# Patient Record
Sex: MALE | Race: WHITE | NOT HISPANIC OR LATINO | Employment: UNEMPLOYED | URBAN - METROPOLITAN AREA
[De-identification: names, ages, dates, MRNs, and addresses within clinical notes are randomized per-mention and may not be internally consistent; named-entity substitution may affect disease eponyms.]

---
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
## 2021-01-26 ENCOUNTER — HISTORICAL (OUTPATIENT)
Dept: GASTROENTEROLOGY | Facility: CLINIC | Age: 21
End: 2021-01-26

## 2021-01-26 LAB
ABS NEUT (OLG): 6.96 X10(3)/MCL (ref 2.1–9.2)
ALBUMIN SERPL-MCNC: 4 GM/DL (ref 3.5–5)
ALBUMIN/GLOB SERPL: 1.1 RATIO (ref 1.1–2)
ALP SERPL-CCNC: 99 UNIT/L (ref 40–150)
ALT SERPL-CCNC: 35 UNIT/L (ref 0–55)
AST SERPL-CCNC: 16 UNIT/L (ref 5–34)
BASOPHILS # BLD AUTO: 0 X10(3)/MCL (ref 0–0.2)
BASOPHILS NFR BLD AUTO: 0 %
BILIRUB SERPL-MCNC: 0.2 MG/DL
BILIRUBIN DIRECT+TOT PNL SERPL-MCNC: 0.1 MG/DL (ref 0–0.5)
BILIRUBIN DIRECT+TOT PNL SERPL-MCNC: 0.1 MG/DL (ref 0–0.8)
BUN SERPL-MCNC: 16 MG/DL (ref 8.9–20.6)
CALCIUM SERPL-MCNC: 9.1 MG/DL (ref 8.4–10.2)
CHLORIDE SERPL-SCNC: 106 MMOL/L (ref 98–107)
CO2 SERPL-SCNC: 26 MMOL/L (ref 22–29)
CREAT SERPL-MCNC: 1.05 MG/DL (ref 0.73–1.18)
CRP SERPL HS-MCNC: 0.65 MG/DL
EOSINOPHIL # BLD AUTO: 0.2 X10(3)/MCL (ref 0–0.9)
EOSINOPHIL NFR BLD AUTO: 1 %
ERYTHROCYTE [DISTWIDTH] IN BLOOD BY AUTOMATED COUNT: 18.6 % (ref 11.5–14.5)
FERRITIN SERPL-MCNC: 4.12 NG/ML (ref 21.81–274.66)
FOLATE SERPL-MCNC: 4.1 NG/ML (ref 7–31.4)
GLOBULIN SER-MCNC: 3.5 GM/DL (ref 2.4–3.5)
GLUCOSE SERPL-MCNC: 87 MG/DL (ref 74–100)
HCT VFR BLD AUTO: 36.3 % (ref 40–51)
HGB BLD-MCNC: 11.2 GM/DL (ref 13.5–17.5)
IMM GRANULOCYTES # BLD AUTO: 0.07 10*3/UL
IMM GRANULOCYTES NFR BLD AUTO: 1 %
IRON SERPL-MCNC: 17 UG/DL (ref 65–175)
LYMPHOCYTES # BLD AUTO: 3.7 X10(3)/MCL (ref 0.6–4.6)
LYMPHOCYTES NFR BLD AUTO: 31 %
MCH RBC QN AUTO: 24.3 PG (ref 26–34)
MCHC RBC AUTO-ENTMCNC: 30.9 GM/DL (ref 31–37)
MCV RBC AUTO: 78.7 FL (ref 80–100)
MONOCYTES # BLD AUTO: 0.9 X10(3)/MCL (ref 0.1–1.3)
MONOCYTES NFR BLD AUTO: 8 %
NEUTROPHILS # BLD AUTO: 6.96 X10(3)/MCL (ref 2.1–9.2)
NEUTROPHILS NFR BLD AUTO: 59 %
PLATELET # BLD AUTO: 380 X10(3)/MCL (ref 130–400)
PMV BLD AUTO: 9.8 FL (ref 7.4–10.4)
POTASSIUM SERPL-SCNC: 3.7 MMOL/L (ref 3.5–5.1)
PROT SERPL-MCNC: 7.5 GM/DL (ref 6.4–8.3)
RBC # BLD AUTO: 4.61 X10(6)/MCL (ref 4.5–5.9)
SODIUM SERPL-SCNC: 142 MMOL/L (ref 136–145)
VIT B12 SERPL-MCNC: 459 PG/ML (ref 213–816)
WBC # SPEC AUTO: 11.9 X10(3)/MCL (ref 4.5–11)

## 2021-02-19 ENCOUNTER — HISTORICAL (OUTPATIENT)
Dept: LAB | Facility: HOSPITAL | Age: 21
End: 2021-02-19

## 2021-02-19 LAB — CRP SERPL HS-MCNC: 1.01 MG/DL

## 2021-03-09 ENCOUNTER — HISTORICAL (OUTPATIENT)
Dept: INFUSION THERAPY | Facility: HOSPITAL | Age: 21
End: 2021-03-09

## 2021-03-24 ENCOUNTER — HISTORICAL (OUTPATIENT)
Dept: INFUSION THERAPY | Facility: HOSPITAL | Age: 21
End: 2021-03-24

## 2021-04-21 ENCOUNTER — HISTORICAL (OUTPATIENT)
Dept: INFUSION THERAPY | Facility: HOSPITAL | Age: 21
End: 2021-04-21

## 2021-06-16 ENCOUNTER — HISTORICAL (OUTPATIENT)
Dept: INFUSION THERAPY | Facility: HOSPITAL | Age: 21
End: 2021-06-16

## 2021-06-16 LAB
ABS NEUT (OLG): 4.44 X10(3)/MCL (ref 2.1–9.2)
ALBUMIN SERPL-MCNC: 4 GM/DL (ref 3.5–5)
ALBUMIN/GLOB SERPL: 1.2 RATIO (ref 1.1–2)
ALP SERPL-CCNC: 84 UNIT/L (ref 40–150)
ALT SERPL-CCNC: 12 UNIT/L (ref 0–55)
AST SERPL-CCNC: 18 UNIT/L (ref 5–34)
BASOPHILS # BLD AUTO: 0.1 X10(3)/MCL (ref 0–0.2)
BASOPHILS NFR BLD AUTO: 1 %
BILIRUB SERPL-MCNC: 0.4 MG/DL
BILIRUBIN DIRECT+TOT PNL SERPL-MCNC: 0.2 MG/DL (ref 0–0.5)
BILIRUBIN DIRECT+TOT PNL SERPL-MCNC: 0.2 MG/DL (ref 0–0.8)
BUN SERPL-MCNC: 10.4 MG/DL (ref 8.9–20.6)
CALCIUM SERPL-MCNC: 9.1 MG/DL (ref 8.4–10.2)
CHLORIDE SERPL-SCNC: 108 MMOL/L (ref 98–107)
CO2 SERPL-SCNC: 24 MMOL/L (ref 22–29)
CREAT SERPL-MCNC: 0.85 MG/DL (ref 0.73–1.18)
EOSINOPHIL # BLD AUTO: 0.3 X10(3)/MCL (ref 0–0.9)
EOSINOPHIL NFR BLD AUTO: 4 %
ERYTHROCYTE [DISTWIDTH] IN BLOOD BY AUTOMATED COUNT: 18.7 % (ref 11.5–14.5)
GLOBULIN SER-MCNC: 3.2 GM/DL (ref 2.4–3.5)
GLUCOSE SERPL-MCNC: 92 MG/DL (ref 74–100)
HCT VFR BLD AUTO: 32.5 % (ref 40–51)
HGB BLD-MCNC: 9.4 GM/DL (ref 13.5–17.5)
IMM GRANULOCYTES # BLD AUTO: 0.01 10*3/UL
IMM GRANULOCYTES NFR BLD AUTO: 0 %
LYMPHOCYTES # BLD AUTO: 2.3 X10(3)/MCL (ref 0.6–4.6)
LYMPHOCYTES NFR BLD AUTO: 30 %
MCH RBC QN AUTO: 20.2 PG (ref 26–34)
MCHC RBC AUTO-ENTMCNC: 28.9 GM/DL (ref 31–37)
MCV RBC AUTO: 69.7 FL (ref 80–100)
MONOCYTES # BLD AUTO: 0.5 X10(3)/MCL (ref 0.1–1.3)
MONOCYTES NFR BLD AUTO: 7 %
NEUTROPHILS # BLD AUTO: 4.44 X10(3)/MCL (ref 2.1–9.2)
NEUTROPHILS NFR BLD AUTO: 58 %
NRBC BLD AUTO-RTO: 0 % (ref 0–0.2)
PLATELET # BLD AUTO: 366 X10(3)/MCL (ref 130–400)
PMV BLD AUTO: 9.5 FL (ref 7.4–10.4)
POTASSIUM SERPL-SCNC: 4.1 MMOL/L (ref 3.5–5.1)
PROT SERPL-MCNC: 7.2 GM/DL (ref 6.4–8.3)
RBC # BLD AUTO: 4.66 X10(6)/MCL (ref 4.5–5.9)
SODIUM SERPL-SCNC: 138 MMOL/L (ref 136–145)
WBC # SPEC AUTO: 7.6 X10(3)/MCL (ref 4.5–11)

## 2021-07-28 ENCOUNTER — HISTORICAL (OUTPATIENT)
Dept: INFUSION THERAPY | Facility: HOSPITAL | Age: 21
End: 2021-07-28

## 2021-08-16 ENCOUNTER — HISTORICAL (OUTPATIENT)
Dept: ADMINISTRATIVE | Facility: HOSPITAL | Age: 21
End: 2021-08-16

## 2021-08-16 LAB — C DIFF INTERP: NEGATIVE

## 2021-08-25 ENCOUNTER — HISTORICAL (OUTPATIENT)
Dept: INFUSION THERAPY | Facility: HOSPITAL | Age: 21
End: 2021-08-25

## 2021-09-22 ENCOUNTER — HISTORICAL (OUTPATIENT)
Dept: INFUSION THERAPY | Facility: HOSPITAL | Age: 21
End: 2021-09-22

## 2021-10-07 ENCOUNTER — HISTORICAL (OUTPATIENT)
Dept: INFUSION THERAPY | Facility: HOSPITAL | Age: 21
End: 2021-10-07

## 2021-10-20 ENCOUNTER — HISTORICAL (OUTPATIENT)
Dept: INFUSION THERAPY | Facility: HOSPITAL | Age: 21
End: 2021-10-20

## 2021-11-17 ENCOUNTER — HISTORICAL (OUTPATIENT)
Dept: INFUSION THERAPY | Facility: HOSPITAL | Age: 21
End: 2021-11-17

## 2021-11-29 ENCOUNTER — HISTORICAL (OUTPATIENT)
Dept: GASTROENTEROLOGY | Facility: CLINIC | Age: 21
End: 2021-11-29

## 2021-11-29 LAB
ABS NEUT (OLG): 5.23 X10(3)/MCL (ref 2.1–9.2)
ALBUMIN SERPL-MCNC: 4 GM/DL (ref 3.5–5)
ALBUMIN/GLOB SERPL: 1.2 RATIO (ref 1.1–2)
ALP SERPL-CCNC: 70 UNIT/L (ref 40–150)
ALT SERPL-CCNC: 13 UNIT/L (ref 0–55)
AST SERPL-CCNC: 14 UNIT/L (ref 5–34)
BASOPHILS # BLD AUTO: 0.1 X10(3)/MCL (ref 0–0.2)
BASOPHILS NFR BLD AUTO: 1 %
BILIRUB SERPL-MCNC: 0.3 MG/DL
BILIRUBIN DIRECT+TOT PNL SERPL-MCNC: 0.1 MG/DL (ref 0–0.5)
BILIRUBIN DIRECT+TOT PNL SERPL-MCNC: 0.2 MG/DL (ref 0–0.8)
BUN SERPL-MCNC: 8.4 MG/DL (ref 8.9–20.6)
CALCIUM SERPL-MCNC: 9.4 MG/DL (ref 8.7–10.5)
CHLORIDE SERPL-SCNC: 107 MMOL/L (ref 98–107)
CO2 SERPL-SCNC: 28 MMOL/L (ref 22–29)
CREAT SERPL-MCNC: 0.79 MG/DL (ref 0.73–1.18)
CRP SERPL HS-MCNC: 0.6 MG/DL
DEPRECATED CALCIDIOL+CALCIFEROL SERPL-MC: 29.6 NG/ML (ref 30–80)
EOSINOPHIL # BLD AUTO: 0.8 X10(3)/MCL (ref 0–0.9)
EOSINOPHIL NFR BLD AUTO: 9 %
ERYTHROCYTE [DISTWIDTH] IN BLOOD BY AUTOMATED COUNT: 21 % (ref 11.5–14.5)
FERRITIN SERPL-MCNC: 3.54 NG/ML (ref 21.81–274.66)
FOLATE SERPL-MCNC: 6 NG/ML (ref 7–31.4)
GLOBULIN SER-MCNC: 3.3 GM/DL (ref 2.4–3.5)
GLUCOSE SERPL-MCNC: 80 MG/DL (ref 74–100)
HBV SURFACE AG SERPL QL IA: NONREACTIVE
HCT VFR BLD AUTO: 31.2 % (ref 40–51)
HGB BLD-MCNC: 9.4 GM/DL (ref 13.5–17.5)
IMM GRANULOCYTES # BLD AUTO: 0.02 10*3/UL
IMM GRANULOCYTES NFR BLD AUTO: 0 %
IRON SERPL-MCNC: 12 UG/DL (ref 65–175)
LYMPHOCYTES # BLD AUTO: 1.6 X10(3)/MCL (ref 0.6–4.6)
LYMPHOCYTES NFR BLD AUTO: 19 %
MCH RBC QN AUTO: 22 PG (ref 26–34)
MCHC RBC AUTO-ENTMCNC: 30.1 GM/DL (ref 31–37)
MCV RBC AUTO: 73.1 FL (ref 80–100)
MONOCYTES # BLD AUTO: 0.7 X10(3)/MCL (ref 0.1–1.3)
MONOCYTES NFR BLD AUTO: 8 %
NEUTROPHILS # BLD AUTO: 5.23 X10(3)/MCL (ref 2.1–9.2)
NEUTROPHILS NFR BLD AUTO: 63 %
NRBC BLD AUTO-RTO: 0 % (ref 0–0.2)
PLATELET # BLD AUTO: 423 X10(3)/MCL (ref 130–400)
PMV BLD AUTO: 9.4 FL (ref 7.4–10.4)
POTASSIUM SERPL-SCNC: 3.9 MMOL/L (ref 3.5–5.1)
PROT SERPL-MCNC: 7.3 GM/DL (ref 6.4–8.3)
RBC # BLD AUTO: 4.27 X10(6)/MCL (ref 4.5–5.9)
SODIUM SERPL-SCNC: 141 MMOL/L (ref 136–145)
VIT B12 SERPL-MCNC: 633 PG/ML (ref 213–816)
WBC # SPEC AUTO: 8.3 X10(3)/MCL (ref 4.5–11)

## 2021-12-10 ENCOUNTER — HISTORICAL (OUTPATIENT)
Dept: GASTROENTEROLOGY | Facility: CLINIC | Age: 21
End: 2021-12-10

## 2021-12-10 LAB — SARS-COV-2 AG RESP QL IA.RAPID: NEGATIVE

## 2021-12-13 ENCOUNTER — HISTORICAL (OUTPATIENT)
Dept: ENDOSCOPY | Facility: HOSPITAL | Age: 21
End: 2021-12-13

## 2021-12-17 ENCOUNTER — HISTORICAL (OUTPATIENT)
Dept: INFUSION THERAPY | Facility: HOSPITAL | Age: 21
End: 2021-12-17

## 2021-12-20 ENCOUNTER — HISTORICAL (OUTPATIENT)
Dept: INFUSION THERAPY | Facility: HOSPITAL | Age: 21
End: 2021-12-20

## 2021-12-27 ENCOUNTER — HISTORICAL (OUTPATIENT)
Dept: INFUSION THERAPY | Facility: HOSPITAL | Age: 21
End: 2021-12-27

## 2022-01-14 ENCOUNTER — HISTORICAL (OUTPATIENT)
Dept: INFUSION THERAPY | Facility: HOSPITAL | Age: 22
End: 2022-01-14

## 2022-02-18 ENCOUNTER — HISTORICAL (OUTPATIENT)
Dept: INFUSION THERAPY | Facility: HOSPITAL | Age: 22
End: 2022-02-18

## 2022-03-18 ENCOUNTER — HISTORICAL (OUTPATIENT)
Dept: INFUSION THERAPY | Facility: HOSPITAL | Age: 22
End: 2022-03-18

## 2022-03-21 ENCOUNTER — HISTORICAL (OUTPATIENT)
Dept: ADMINISTRATIVE | Facility: HOSPITAL | Age: 22
End: 2022-03-21

## 2022-04-05 DIAGNOSIS — K51.919 ULCERATIVE COLITIS WITH COMPLICATION, UNSPECIFIED LOCATION: ICD-10-CM

## 2022-04-11 ENCOUNTER — HISTORICAL (OUTPATIENT)
Dept: ADMINISTRATIVE | Facility: HOSPITAL | Age: 22
End: 2022-04-11
Payer: MEDICAID

## 2022-04-27 VITALS
BODY MASS INDEX: 29.65 KG/M2 | WEIGHT: 231.06 LBS | SYSTOLIC BLOOD PRESSURE: 132 MMHG | DIASTOLIC BLOOD PRESSURE: 73 MMHG | HEIGHT: 74 IN | OXYGEN SATURATION: 97 %

## 2022-05-05 NOTE — HISTORICAL [facility]ER
This is a historical note converted from [employer]. Formatting and pictures may have been removed.  Please reference Patricia for original formatting and attached multimedia. History of Present Illness  Mr. Gutierrez is a 20 year old CM with hematochezia here for a colonoscopy.  ?   His symptoms started approximately 1 year ago?when he started having rectal bleeding.? He thought this was due to hemorrhoids in?believe they would resolve on its own.? Over the past several months?his?symptoms have progressively worsened. ?He has?8-11?soft stools with blood?on a daily basis?with associated urgency?and lower abdominal cramping.? He does have some tenesmus as well.? His appetite has been good and he has been?eating well. ?He denies any significant weight loss.? He denies any fevers, chills,?night sweats.? He denies any?dysphagia,?heartburn, or reflux.  ?   Seen by Griselda in June and referred for a colonoscopy. ?He had a CT scan in September that showed mucosal hyperemia in the sigmoid and rectum with numerous lymph nodes in the retroperitoneum and mesentery.  Blood work in September 2020 showed a normal albumin of 3.6. ?CMP was unremarkable. ?Mild leukocytosis of 11 with a hemoglobin of 9.5 g/dL. ?His MCV is 79. ?Repeat blood work in October showed a hemoglobin of 10.0 g/dL with an MCV of 80.  ?  ?  He denies ever having an EGD or colonoscopy done. ?He denies regular NSAID use. ?He denies tobacco or alcohol use. ?He denies a family history?of IBD, colon polyps, or colon cancer.  ?  Review of Systems  Comprehensive Review of Systems performed with no exceptions other than as noted in HPI.  ?  Physical Exam  Vitals & Measurements  T:?37? ?C (Oral)? HR:?71(Monitored)? RR:?16? BP:?114/62? SpO2:?100%? WT:?106.87?kg? BMI:?31.19?  General:?well-developed well-nourished in no acute distress  Eye: PERRLA, EOMI, clear conjunctiva  HENT:? oropharynx without erythema/exudate, oropharynx and nasal mucosal surfaces moist  Neck:? no thyromegaly  or lymphadenopathy, trachea midline  Respiratory:?symmetrical chest expansion and respiratory effort, clear to auscultation bilaterally  Cardiovascular:?regular rate and rhythm without murmurs, gallops or rubs  Gastrointestinal:?soft, non-tender, non-distended with normal bowel sounds, without masses to palpation  Integumentary: no rashes or skin lesions present  Neurologic: cranial nerves intact, no asterixis, awake, alert, and oriented  Psych: good insight, appropriate mood, normal affect  ?  Assessment/Plan  Mr. Gutierrez is a 20 year old CM with hematochezia here for a colonoscopy.  ?   Risks, benefits, and alternatives of the procedure discussed.?  Will proceed with endoscopic procedure as scheduled.   Problem List/Past Medical History  Ongoing  Hematochezia  Obesity  Historical  No qualifying data  Procedure/Surgical History  Biopsy Gastrointestinal (None) (11/11/2020)  Colonoscopy (None) (11/11/2020)  polyp cyst removal (11.2019)  Tonsillectomy (04.2002)   Medications  Inpatient  buffered lidocaine 2% - 0.5 ml syringe, 10 mg= 0.5 mL, Subcutaneous, As Directed  IVF Lactated Ringers LR Infusion 1,000 mL, 1000 mL, IV  Home  albuterol CFC free 90 mcg/inh inhalation aerosol with adapter, 2 puff(s), INH, q6hr  Colace 100 mg oral capsule, 100 mg= 1 cap(s), Oral, Daily, PRN  escitalopram 10 mg oral tablet, 10 mg= 1 tab(s), Oral, Daily  omeprazole 20 mg oral DR capsule, 20 mg= 1 cap(s), Oral, Daily  Allergies  No Known Medication Allergies  Social History  Abuse/Neglect  No, 11/11/2020  Alcohol  Current, 1-2 times per month, 09/21/2020  Substance Use  Past, Marijuana, 09/21/2020  Tobacco  Cigars or pipes but not daily within last 30 days, Cigars, No, 11/11/2020  Former smoker, quit more than 30 days ago, No, 11/10/2020  Family History  Hypertension.: Mother.

## 2022-05-05 NOTE — HISTORICAL [facility]
This is a historical note converted from Patricia. Formatting and pictures may have been removed.  Please reference Patricia for original formatting and attached multimedia. Chief Complaint  Sigmoidoscopy  History of Present Illness  20 y/o M with ulcerative colitis presenting today for sigmoidoscopy. He was diagnosed in November 2020 and has not been scoped since. He has been on Entyvio and a prednisone taper. States no significant changes in symptoms since prednisone was started. Still has frequent blood in stool. Scope today is planned for follow up/treatment planning.  ?  He also notes that he is concerned his pilonidal disease is recurring due to pain and fullness in his gluteal cleft.  Review of Systems  Negative except as above  Physical Exam  Vitals & Measurements  T:?37.0? ?C (Oral)? HR:?76(Monitored)? RR:?18? BP:?112/62? SpO2:?99%? WT:?104.3?kg? BMI:?30.44?  NAD  No increased WOB, on room air  RR  Abdomen soft, non tender  Assessment/Plan  20 y/o M with ulcerative colitis here for sigmoidoscopy  ?  - CLD yesterday, NPO overnight. 2 fleets enemas yesterday  - consent obtained  - plan for scope this AM  ?  Madison Abraham MD  [employer] General Surgery, PGY2   Problem List/Past Medical History  Ongoing  Hematochezia  NASIM (iron deficiency anemia)  Obesity  Ulcerative colitis  Historical  No qualifying data  Procedure/Surgical History  [place] teeth (01/16/2021)  Biopsy Gastrointestinal (None) (11/11/2020)  Colonoscopy (None) (11/11/2020)  Colonoscopy, flexible; with biopsy, single or multiple (11/11/2020)  Excision of Ascending Colon, Via Natural or Artificial Opening Endoscopic, Diagnostic (11/11/2020)  Excision of Cecum, Via Natural or Artificial Opening Endoscopic, Diagnostic (11/11/2020)  Excision of Descending Colon, Via Natural or Artificial Opening Endoscopic, Diagnostic (11/11/2020)  Excision of Rectum, Via Natural or Artificial Opening Endoscopic, Diagnostic (11/11/2020)  Excision of Sigmoid Colon, Via Natural  or Artificial Opening Endoscopic, Diagnostic (11/11/2020)  Excision of Transverse Colon, Via Natural or Artificial Opening Endoscopic, Diagnostic (11/11/2020)  polyp cyst removal (11.2019)  Tonsillectomy (04.2002)   Medications  Inpatient  IVF Lactated Ringers LR Infusion 1,000 mL, 1000 mL, IV  Home  Canasa 1000 mg rectal suppository, 1000 mg= 1 supp, WV (rectal), qPM,? ?Not taking  escitalopram 10 mg oral tablet, 10 mg= 1 tab(s), Oral, Daily  meclizine 25 mg oral tablet, 25 mg= 1 tab(s), Oral, TID,? ?Not taking  omeprazole 20 mg oral DR capsule, 20 mg= 1 cap(s), Oral, Daily  Allergies  No Known Medication Allergies  Social History  Abuse/Neglect  No, No, Yes, 12/13/2021  Alcohol  Current, Beer, 1-2 times per month, 06/16/2021  Exercise  Exercise type: Weight lifting., 06/01/2021  Exercise duration: 60. Exercise frequency: Daily. Exercise type: Walking., 06/01/2021  Spiritual/Cultural  Confucianist, 06/01/2021  Substance Use  Past, Marijuana, 06/16/2021  Tobacco  Cigars or pipes but not daily within last 30 days, No, 12/13/2021  Family History  Hypertension.: Mother.  Immunizations  Vaccine Date Status   pneumococcal 13-valent conjugate vaccine 09/22/2021 Given   meningococcal conjugate vaccine 05/25/2021 Recorded   tetanus/diphtheria/pertussis, acel(Tdap) 06/11/2013 Recorded   meningococcal conjugate vaccine 06/11/2013 Recorded   influenza virus vaccine, live, trivalent 12/18/2009 Recorded   varicella virus vaccine 06/13/2008 Recorded   poliovirus vaccine, inactivated 11/02/2005 Recorded   measles/mumps/rubella virus vaccine 11/02/2005 Recorded   diphtheria/pertussis, acel/tetanus ped 11/02/2005 Recorded   diphtheria/pertussis, acel/tetanus ped 11/26/2002 Recorded   varicella virus vaccine 02/06/2002 Recorded   pneumococcal 7-valent vaccine 02/06/2002 Recorded   measles/mumps/rubella virus vaccine 02/06/2002 Recorded   diphtheria/pertussis, acel/tetanus ped 2001 Recorded   poliovirus vaccine, inactivated  2001 Recorded   poliovirus vaccine, inactivated 2001 Recorded   pneumococcal 7-valent vaccine 2001 Recorded   diphtheria/pertussis, acel/tetanus ped 2001 Recorded   poliovirus vaccine, inactivated 2001 Recorded   pneumococcal 7-valent vaccine 2001 Recorded   hepatitis B pediatric vaccine 2001 Recorded   diphtheria/pertussis, acel/tetanus ped 2001 Recorded   hepatitis B pediatric vaccine 2001 Recorded       Patient with ulcerative colitis, has been on Entyvio?300 mg every 4 weeks since May 2021.? Has had slow but improvement over time. ?Now off steroids and?having on average 5 formed bowel movements a day. ?He does?still have some urgency but this is better. ?He sees streaks of blood at times in his stool.? Denies any significant abdominal pain or cramping.? He is presently having difficulty with pilonidal cyst which she has had?lanced/resected in the past.

## 2022-05-19 DIAGNOSIS — K51.919 ULCERATIVE COLITIS WITH COMPLICATION, UNSPECIFIED LOCATION: Primary | ICD-10-CM

## 2022-05-19 RX ORDER — METHYLPREDNISOLONE SOD SUCC 125 MG
40 VIAL (EA) INJECTION
Status: CANCELLED | OUTPATIENT
Start: 2022-05-20

## 2022-05-19 RX ORDER — IPRATROPIUM BROMIDE AND ALBUTEROL SULFATE 2.5; .5 MG/3ML; MG/3ML
3 SOLUTION RESPIRATORY (INHALATION)
Status: CANCELLED | OUTPATIENT
Start: 2022-05-20

## 2022-05-19 RX ORDER — SODIUM CHLORIDE 0.9 % (FLUSH) 0.9 %
10 SYRINGE (ML) INJECTION
Status: CANCELLED | OUTPATIENT
Start: 2022-05-20

## 2022-05-19 RX ORDER — ACETAMINOPHEN 325 MG/1
650 TABLET ORAL
Status: CANCELLED | OUTPATIENT
Start: 2022-05-20

## 2022-05-19 RX ORDER — HEPARIN 100 UNIT/ML
500 SYRINGE INTRAVENOUS
Status: CANCELLED | OUTPATIENT
Start: 2022-05-20

## 2022-05-19 RX ORDER — EPINEPHRINE 1 MG/ML
0.3 INJECTION, SOLUTION, CONCENTRATE INTRAVENOUS
Status: CANCELLED | OUTPATIENT
Start: 2022-05-20

## 2022-05-19 RX ORDER — DIPHENHYDRAMINE HYDROCHLORIDE 50 MG/ML
25 INJECTION INTRAMUSCULAR; INTRAVENOUS
Status: CANCELLED | OUTPATIENT
Start: 2022-05-20

## 2022-05-20 ENCOUNTER — INFUSION (OUTPATIENT)
Dept: INFUSION THERAPY | Facility: HOSPITAL | Age: 22
End: 2022-05-20
Attending: INTERNAL MEDICINE
Payer: MEDICAID

## 2022-05-20 VITALS
HEART RATE: 77 BPM | OXYGEN SATURATION: 100 % | RESPIRATION RATE: 18 BRPM | SYSTOLIC BLOOD PRESSURE: 128 MMHG | HEIGHT: 72 IN | TEMPERATURE: 98 F | BODY MASS INDEX: 29.11 KG/M2 | WEIGHT: 214.94 LBS | DIASTOLIC BLOOD PRESSURE: 69 MMHG

## 2022-05-20 DIAGNOSIS — K51.919 ULCERATIVE COLITIS WITH COMPLICATION, UNSPECIFIED LOCATION: Primary | ICD-10-CM

## 2022-05-20 PROCEDURE — 63600175 PHARM REV CODE 636 W HCPCS: Mod: JG

## 2022-05-20 PROCEDURE — 25000003 PHARM REV CODE 250

## 2022-05-20 PROCEDURE — 96365 THER/PROPH/DIAG IV INF INIT: CPT

## 2022-05-20 RX ORDER — HEPARIN 100 UNIT/ML
500 SYRINGE INTRAVENOUS
Status: CANCELLED | OUTPATIENT
Start: 2022-06-17

## 2022-05-20 RX ORDER — SODIUM CHLORIDE 0.9 % (FLUSH) 0.9 %
10 SYRINGE (ML) INJECTION
Status: DISCONTINUED | OUTPATIENT
Start: 2022-05-20 | End: 2022-05-20 | Stop reason: HOSPADM

## 2022-05-20 RX ORDER — ACETAMINOPHEN 325 MG/1
650 TABLET ORAL
Status: ACTIVE | OUTPATIENT
Start: 2022-05-20 | End: 2022-05-20

## 2022-05-20 RX ORDER — SODIUM CHLORIDE 0.9 % (FLUSH) 0.9 %
10 SYRINGE (ML) INJECTION
Status: CANCELLED | OUTPATIENT
Start: 2022-06-17

## 2022-05-20 RX ORDER — METHYLPREDNISOLONE SOD SUCC 125 MG
40 VIAL (EA) INJECTION
Status: ACTIVE | OUTPATIENT
Start: 2022-05-20 | End: 2022-05-20

## 2022-05-20 RX ORDER — EPINEPHRINE 1 MG/ML
0.3 INJECTION, SOLUTION, CONCENTRATE INTRAVENOUS
Status: DISPENSED | OUTPATIENT
Start: 2022-05-20 | End: 2022-05-20

## 2022-05-20 RX ORDER — DIPHENHYDRAMINE HYDROCHLORIDE 50 MG/ML
25 INJECTION INTRAMUSCULAR; INTRAVENOUS
Status: ACTIVE | OUTPATIENT
Start: 2022-05-20 | End: 2022-05-20

## 2022-05-20 RX ORDER — IPRATROPIUM BROMIDE AND ALBUTEROL SULFATE 2.5; .5 MG/3ML; MG/3ML
3 SOLUTION RESPIRATORY (INHALATION)
Status: ACTIVE | OUTPATIENT
Start: 2022-05-20 | End: 2022-05-20

## 2022-05-20 RX ORDER — IPRATROPIUM BROMIDE AND ALBUTEROL SULFATE 2.5; .5 MG/3ML; MG/3ML
3 SOLUTION RESPIRATORY (INHALATION)
Status: CANCELLED | OUTPATIENT
Start: 2022-06-17

## 2022-05-20 RX ORDER — HEPARIN SODIUM (PORCINE) LOCK FLUSH IV SOLN 100 UNIT/ML 100 UNIT/ML
500 SOLUTION INTRAVENOUS
Status: DISCONTINUED | OUTPATIENT
Start: 2022-05-20 | End: 2022-05-20 | Stop reason: HOSPADM

## 2022-05-20 RX ORDER — ACETAMINOPHEN 325 MG/1
650 TABLET ORAL
Status: CANCELLED | OUTPATIENT
Start: 2022-06-17

## 2022-05-20 RX ORDER — DIPHENHYDRAMINE HYDROCHLORIDE 50 MG/ML
25 INJECTION INTRAMUSCULAR; INTRAVENOUS
Status: CANCELLED | OUTPATIENT
Start: 2022-06-17

## 2022-05-20 RX ORDER — METHYLPREDNISOLONE SOD SUCC 125 MG
40 VIAL (EA) INJECTION
Status: CANCELLED | OUTPATIENT
Start: 2022-06-17

## 2022-05-20 RX ORDER — EPINEPHRINE 1 MG/ML
0.3 INJECTION, SOLUTION, CONCENTRATE INTRAVENOUS
Status: CANCELLED | OUTPATIENT
Start: 2022-06-17

## 2022-05-20 RX ADMIN — VEDOLIZUMAB 300 MG: 300 INJECTION, POWDER, LYOPHILIZED, FOR SOLUTION INTRAVENOUS at 12:05

## 2022-06-15 ENCOUNTER — TELEPHONE (OUTPATIENT)
Dept: GASTROENTEROLOGY | Facility: CLINIC | Age: 22
End: 2022-06-15
Payer: MEDICAID

## 2022-06-15 DIAGNOSIS — K51.011 ULCERATIVE PANCOLITIS WITH RECTAL BLEEDING: Primary | ICD-10-CM

## 2022-06-15 RX ORDER — PREDNISONE 10 MG/1
TABLET ORAL
Qty: 84 TABLET | Refills: 1 | Status: SHIPPED | OUTPATIENT
Start: 2022-06-15 | End: 2022-07-27

## 2022-06-15 NOTE — TELEPHONE ENCOUNTER
----- Message from Jyoti Sorto sent at 6/15/2022 10:21 AM CDT -----  Pt mom Tess called pt is scheduled for infusion on 6/17 but pt has been having a bad flare up. Flare up started last week. Please contact pt mom at work ask for Susana Gutierrez [phone] or cell # [phone]

## 2022-06-15 NOTE — TELEPHONE ENCOUNTER
Spoke to Mom.  Stated Pt has been in a flare. Mom states that he is having about 10-15 Bm's daily.  Some rectal bleeding, very pale and is sleeping a lot. Symptoms started last Tuesday. He is still on infusions and is due for his next on on 6/17.  It does not appear he has a clinic apt scheduled.    I called and spoke to Saji; He is not taking the tacrolimus suppositories, Rectal bleeding is dark red with every BM, having about 5-7 Bms daily, no fevers, waking in the middle of the night to have BM's.  He notices that sometime after Infusions he feels good then other times he feels worse.      He did state that he went a month without an infusion due to the infusion center scheduling issues in April

## 2022-06-16 NOTE — TELEPHONE ENCOUNTER
Spoke to Mother Tess.  Advised her of recommendations.  He will start the suppositories and do his stool studies tomorrow.  PT coming in for his infusion. Will give infusion center a stool kit to give him.

## 2022-06-17 ENCOUNTER — INFUSION (OUTPATIENT)
Dept: INFUSION THERAPY | Facility: HOSPITAL | Age: 22
End: 2022-06-17
Attending: INTERNAL MEDICINE
Payer: MEDICAID

## 2022-06-17 VITALS
TEMPERATURE: 98 F | DIASTOLIC BLOOD PRESSURE: 66 MMHG | RESPIRATION RATE: 18 BRPM | HEART RATE: 67 BPM | HEIGHT: 72 IN | SYSTOLIC BLOOD PRESSURE: 129 MMHG | BODY MASS INDEX: 29.15 KG/M2 | OXYGEN SATURATION: 100 %

## 2022-06-17 DIAGNOSIS — K51.919 ULCERATIVE COLITIS WITH COMPLICATION, UNSPECIFIED LOCATION: Primary | ICD-10-CM

## 2022-06-17 PROCEDURE — 63600175 PHARM REV CODE 636 W HCPCS: Mod: JG

## 2022-06-17 PROCEDURE — 96367 TX/PROPH/DG ADDL SEQ IV INF: CPT

## 2022-06-17 PROCEDURE — 96365 THER/PROPH/DIAG IV INF INIT: CPT

## 2022-06-17 PROCEDURE — 25000003 PHARM REV CODE 250

## 2022-06-17 RX ORDER — SODIUM CHLORIDE 0.9 % (FLUSH) 0.9 %
10 SYRINGE (ML) INJECTION
Status: CANCELLED | OUTPATIENT
Start: 2022-06-17

## 2022-06-17 RX ORDER — HEPARIN 100 UNIT/ML
500 SYRINGE INTRAVENOUS
Status: CANCELLED | OUTPATIENT
Start: 2022-06-17

## 2022-06-17 RX ORDER — METHYLPREDNISOLONE SOD SUCC 125 MG
40 VIAL (EA) INJECTION
Status: CANCELLED | OUTPATIENT
Start: 2022-06-17

## 2022-06-17 RX ORDER — HEPARIN 100 UNIT/ML
500 SYRINGE INTRAVENOUS
Status: DISCONTINUED | OUTPATIENT
Start: 2022-06-17 | End: 2022-06-17 | Stop reason: HOSPADM

## 2022-06-17 RX ORDER — DIPHENHYDRAMINE HYDROCHLORIDE 50 MG/ML
25 INJECTION INTRAMUSCULAR; INTRAVENOUS
Status: DISCONTINUED | OUTPATIENT
Start: 2022-06-17 | End: 2022-06-17 | Stop reason: HOSPADM

## 2022-06-17 RX ORDER — DIPHENHYDRAMINE HYDROCHLORIDE 50 MG/ML
25 INJECTION INTRAMUSCULAR; INTRAVENOUS
Status: CANCELLED | OUTPATIENT
Start: 2022-07-15

## 2022-06-17 RX ORDER — SODIUM CHLORIDE 0.9 % (FLUSH) 0.9 %
10 SYRINGE (ML) INJECTION
Status: DISCONTINUED | OUTPATIENT
Start: 2022-06-17 | End: 2022-06-17 | Stop reason: HOSPADM

## 2022-06-17 RX ORDER — IPRATROPIUM BROMIDE AND ALBUTEROL SULFATE 2.5; .5 MG/3ML; MG/3ML
3 SOLUTION RESPIRATORY (INHALATION)
Status: CANCELLED | OUTPATIENT
Start: 2022-06-17

## 2022-06-17 RX ORDER — EPINEPHRINE 1 MG/ML
0.3 INJECTION, SOLUTION, CONCENTRATE INTRAVENOUS
Status: CANCELLED | OUTPATIENT
Start: 2022-06-17

## 2022-06-17 RX ORDER — METHYLPREDNISOLONE SOD SUCC 125 MG
40 VIAL (EA) INJECTION
Status: DISCONTINUED | OUTPATIENT
Start: 2022-06-17 | End: 2022-06-17 | Stop reason: HOSPADM

## 2022-06-17 RX ORDER — ACETAMINOPHEN 325 MG/1
650 TABLET ORAL
Status: CANCELLED | OUTPATIENT
Start: 2022-07-15

## 2022-06-17 RX ORDER — HEPARIN 100 UNIT/ML
500 SYRINGE INTRAVENOUS
Status: CANCELLED | OUTPATIENT
Start: 2022-07-15

## 2022-06-17 RX ORDER — IPRATROPIUM BROMIDE AND ALBUTEROL SULFATE 2.5; .5 MG/3ML; MG/3ML
3 SOLUTION RESPIRATORY (INHALATION)
Status: CANCELLED | OUTPATIENT
Start: 2022-07-15

## 2022-06-17 RX ORDER — ACETAMINOPHEN 325 MG/1
650 TABLET ORAL
Status: CANCELLED | OUTPATIENT
Start: 2022-06-17

## 2022-06-17 RX ORDER — IPRATROPIUM BROMIDE AND ALBUTEROL SULFATE 2.5; .5 MG/3ML; MG/3ML
3 SOLUTION RESPIRATORY (INHALATION)
Status: DISCONTINUED | OUTPATIENT
Start: 2022-06-17 | End: 2022-06-17 | Stop reason: HOSPADM

## 2022-06-17 RX ORDER — EPINEPHRINE 1 MG/ML
0.3 INJECTION, SOLUTION, CONCENTRATE INTRAVENOUS
Status: DISCONTINUED | OUTPATIENT
Start: 2022-06-17 | End: 2022-06-17 | Stop reason: HOSPADM

## 2022-06-17 RX ORDER — EPINEPHRINE 1 MG/ML
0.3 INJECTION, SOLUTION, CONCENTRATE INTRAVENOUS
Status: CANCELLED | OUTPATIENT
Start: 2022-07-15

## 2022-06-17 RX ORDER — DIPHENHYDRAMINE HYDROCHLORIDE 50 MG/ML
25 INJECTION INTRAMUSCULAR; INTRAVENOUS
Status: CANCELLED | OUTPATIENT
Start: 2022-06-17

## 2022-06-17 RX ORDER — ACETAMINOPHEN 325 MG/1
650 TABLET ORAL
Status: DISCONTINUED | OUTPATIENT
Start: 2022-06-17 | End: 2022-06-17 | Stop reason: HOSPADM

## 2022-06-17 RX ORDER — METHYLPREDNISOLONE SOD SUCC 125 MG
40 VIAL (EA) INJECTION
Status: CANCELLED | OUTPATIENT
Start: 2022-07-15

## 2022-06-17 RX ORDER — SODIUM CHLORIDE 0.9 % (FLUSH) 0.9 %
10 SYRINGE (ML) INJECTION
Status: CANCELLED | OUTPATIENT
Start: 2022-07-15

## 2022-06-17 RX ADMIN — SODIUM CHLORIDE: 9 INJECTION, SOLUTION INTRAVENOUS at 01:06

## 2022-06-17 RX ADMIN — VEDOLIZUMAB 300 MG: 300 INJECTION, POWDER, LYOPHILIZED, FOR SOLUTION INTRAVENOUS at 02:06

## 2022-06-27 ENCOUNTER — TELEPHONE (OUTPATIENT)
Dept: INFUSION THERAPY | Facility: HOSPITAL | Age: 22
End: 2022-06-27
Payer: MEDICAID

## 2022-06-27 NOTE — TELEPHONE ENCOUNTER
Spoke with Sharon. She confirmed that their is a current PA for Prashant in pt chart- good until 5/19/23

## 2022-06-27 NOTE — TELEPHONE ENCOUNTER
His prior PA for Entyvio does not  until 22 and on 22 13 visits for Entyvio q 4 weeks was approved per the PA team in [facility]. I have Sharon (Infusion PA) looking into this

## 2022-06-27 NOTE — TELEPHONE ENCOUNTER
"----- Message from Sharon Mustafa sent at 6/21/2022  8:04 AM CDT -----  Regarding: Entyvio denial  Authorization for Entyvio has been denied after requesting a reconsideration and faxing in last Hep B results. [facility] has stated the test was "outdated"?  A peer to peer review will be required. Please call [phone].    "

## 2022-06-27 NOTE — TELEPHONE ENCOUNTER
"----- Message from Eleonora Philip MD sent at 2022  2:14 PM CDT -----  Regarding: RE: Entyvio denial  It sounds as if denied due to hepatitis b status and dosing (every 4 weeks).  I have peer to peer set up on Tuesday at 1:30pm    ----- Message -----  From: Carol Garcia MA  Sent: 2022   3:39 PM CDT  To: Eleonora Philip MD  Subject: RE: Prashant BOYD,    This is the request for his future infusion since it  in .  He is scheduled to come in clinic  and his next infusion is before that.  Can we order the blood work needed or try to go with peer without it or should I move up his clinic visit?  Last hep test was 2021.    ----- Message -----  From: Eleonora Philip MD  Sent: 2022   5:06 PM CDT  To: [facility] Clinical Support Staff  Subject: FW: Entyvio denial                                 ----- Message -----  From: Sharon Mustafa  Sent: 2022   8:09 AM CDT  To: Eleonora Philip MD, Mikala Hutchison  Subject: Entyvio denial                                   Authorization for Entyvio has been denied after requesting a reconsideration and faxing in last Hep B results. [facility] has stated the test was "outdated"?  A peer to peer review will be required. Please call [phone].          "

## 2022-06-28 DIAGNOSIS — K51.919 ULCERATIVE COLITIS WITH COMPLICATION, UNSPECIFIED LOCATION: Primary | ICD-10-CM

## 2022-06-29 ENCOUNTER — LAB VISIT (OUTPATIENT)
Dept: LAB | Facility: HOSPITAL | Age: 22
End: 2022-06-29
Attending: INTERNAL MEDICINE
Payer: MEDICAID

## 2022-06-29 DIAGNOSIS — K51.919 ULCERATIVE COLITIS WITH COMPLICATION, UNSPECIFIED LOCATION: ICD-10-CM

## 2022-06-29 LAB
HBV SURFACE AB SER-ACNC: 2.29 MIU/ML
HBV SURFACE AB SERPL IA-ACNC: NONREACTIVE M[IU]/ML
HBV SURFACE AG SERPL QL IA: NONREACTIVE

## 2022-06-29 PROCEDURE — 86480 TB TEST CELL IMMUN MEASURE: CPT

## 2022-06-29 PROCEDURE — 36415 COLL VENOUS BLD VENIPUNCTURE: CPT

## 2022-06-29 PROCEDURE — 87340 HEPATITIS B SURFACE AG IA: CPT

## 2022-06-29 PROCEDURE — 86706 HEP B SURFACE ANTIBODY: CPT

## 2022-06-30 ENCOUNTER — TELEPHONE (OUTPATIENT)
Dept: GASTROENTEROLOGY | Facility: CLINIC | Age: 22
End: 2022-06-30
Payer: MEDICAID

## 2022-06-30 NOTE — TELEPHONE ENCOUNTER
----- Message from Eleonora Philip MD sent at 6/29/2022 11:30 AM CDT -----  All negative.  Please fax to insurance.     ----- Message -----  From: Mikala Hutchison  Sent: 6/29/2022  11:20 AM CDT  To: Eleonora Philip MD    Please review and advise. Will submit with PA for Entyvio

## 2022-07-01 LAB
GAMMA INTERFERON BACKGROUND BLD IA-ACNC: 0.01 IU/ML
M TB IFN-G BLD-IMP: NEGATIVE
M TB IFN-G CD4+ BCKGRND COR BLD-ACNC: 0 IU/ML
M TB IFN-G CD4+CD8+ BCKGRND COR BLD-ACNC: 0 IU/ML
MITOGEN IGNF BCKGRD COR BLD-ACNC: 1.96 IU/ML

## 2022-07-15 ENCOUNTER — INFUSION (OUTPATIENT)
Dept: INFUSION THERAPY | Facility: HOSPITAL | Age: 22
End: 2022-07-15
Attending: INTERNAL MEDICINE
Payer: MEDICAID

## 2022-07-15 VITALS
WEIGHT: 216.06 LBS | DIASTOLIC BLOOD PRESSURE: 71 MMHG | SYSTOLIC BLOOD PRESSURE: 134 MMHG | OXYGEN SATURATION: 99 % | HEIGHT: 72 IN | BODY MASS INDEX: 29.26 KG/M2 | TEMPERATURE: 98 F | HEART RATE: 87 BPM | RESPIRATION RATE: 18 BRPM

## 2022-07-15 DIAGNOSIS — K51.919 ULCERATIVE COLITIS WITH COMPLICATION, UNSPECIFIED LOCATION: Primary | ICD-10-CM

## 2022-07-15 PROCEDURE — 96365 THER/PROPH/DIAG IV INF INIT: CPT

## 2022-07-15 PROCEDURE — 63600175 PHARM REV CODE 636 W HCPCS: Mod: JG

## 2022-07-15 PROCEDURE — 25000003 PHARM REV CODE 250

## 2022-07-15 RX ORDER — DIPHENHYDRAMINE HYDROCHLORIDE 50 MG/ML
25 INJECTION INTRAMUSCULAR; INTRAVENOUS
Status: ACTIVE | OUTPATIENT
Start: 2022-07-15 | End: 2022-07-15

## 2022-07-15 RX ORDER — HEPARIN 100 UNIT/ML
500 SYRINGE INTRAVENOUS
Status: CANCELLED | OUTPATIENT
Start: 2022-07-15

## 2022-07-15 RX ORDER — DIPHENHYDRAMINE HYDROCHLORIDE 50 MG/ML
25 INJECTION INTRAMUSCULAR; INTRAVENOUS
Status: CANCELLED | OUTPATIENT
Start: 2022-07-15

## 2022-07-15 RX ORDER — HEPARIN 100 UNIT/ML
500 SYRINGE INTRAVENOUS
Status: DISCONTINUED | OUTPATIENT
Start: 2022-07-15 | End: 2022-07-15 | Stop reason: HOSPADM

## 2022-07-15 RX ORDER — IPRATROPIUM BROMIDE AND ALBUTEROL SULFATE 2.5; .5 MG/3ML; MG/3ML
3 SOLUTION RESPIRATORY (INHALATION)
Status: ACTIVE | OUTPATIENT
Start: 2022-07-15 | End: 2022-07-15

## 2022-07-15 RX ORDER — EPINEPHRINE 1 MG/ML
0.3 INJECTION, SOLUTION, CONCENTRATE INTRAVENOUS
Status: DISPENSED | OUTPATIENT
Start: 2022-07-15 | End: 2022-07-15

## 2022-07-15 RX ORDER — ACETAMINOPHEN 325 MG/1
650 TABLET ORAL
Status: CANCELLED | OUTPATIENT
Start: 2022-07-15

## 2022-07-15 RX ORDER — METHYLPREDNISOLONE SOD SUCC 125 MG
40 VIAL (EA) INJECTION
Status: CANCELLED | OUTPATIENT
Start: 2022-07-15

## 2022-07-15 RX ORDER — SODIUM CHLORIDE 0.9 % (FLUSH) 0.9 %
10 SYRINGE (ML) INJECTION
Status: CANCELLED | OUTPATIENT
Start: 2022-07-15

## 2022-07-15 RX ORDER — SODIUM CHLORIDE 0.9 % (FLUSH) 0.9 %
10 SYRINGE (ML) INJECTION
Status: DISCONTINUED | OUTPATIENT
Start: 2022-07-15 | End: 2022-07-15 | Stop reason: HOSPADM

## 2022-07-15 RX ORDER — ACETAMINOPHEN 325 MG/1
650 TABLET ORAL
Status: ACTIVE | OUTPATIENT
Start: 2022-07-15 | End: 2022-07-15

## 2022-07-15 RX ORDER — IPRATROPIUM BROMIDE AND ALBUTEROL SULFATE 2.5; .5 MG/3ML; MG/3ML
3 SOLUTION RESPIRATORY (INHALATION)
Status: CANCELLED | OUTPATIENT
Start: 2022-07-15

## 2022-07-15 RX ORDER — EPINEPHRINE 1 MG/ML
0.3 INJECTION, SOLUTION, CONCENTRATE INTRAVENOUS
Status: CANCELLED | OUTPATIENT
Start: 2022-07-15

## 2022-07-15 RX ORDER — METHYLPREDNISOLONE SOD SUCC 125 MG
40 VIAL (EA) INJECTION
Status: ACTIVE | OUTPATIENT
Start: 2022-07-15 | End: 2022-07-15

## 2022-07-15 RX ADMIN — VEDOLIZUMAB 300 MG: 300 INJECTION, POWDER, LYOPHILIZED, FOR SOLUTION INTRAVENOUS at 01:07

## 2022-07-26 ENCOUNTER — OFFICE VISIT (OUTPATIENT)
Dept: GASTROENTEROLOGY | Facility: CLINIC | Age: 22
End: 2022-07-26
Payer: MEDICAID

## 2022-07-26 VITALS
SYSTOLIC BLOOD PRESSURE: 112 MMHG | WEIGHT: 210.19 LBS | DIASTOLIC BLOOD PRESSURE: 66 MMHG | HEIGHT: 73 IN | BODY MASS INDEX: 27.86 KG/M2 | HEART RATE: 69 BPM | RESPIRATION RATE: 18 BRPM | TEMPERATURE: 98 F

## 2022-07-26 DIAGNOSIS — K51.919 ULCERATIVE COLITIS WITH COMPLICATION, UNSPECIFIED LOCATION: Primary | ICD-10-CM

## 2022-07-26 DIAGNOSIS — D50.0 IRON DEFICIENCY ANEMIA DUE TO CHRONIC BLOOD LOSS: ICD-10-CM

## 2022-07-26 PROCEDURE — 99214 OFFICE O/P EST MOD 30 MIN: CPT | Mod: PBBFAC | Performed by: INTERNAL MEDICINE

## 2022-07-26 RX ORDER — DOXYCYCLINE HYCLATE 100 MG
100 TABLET ORAL 2 TIMES DAILY
COMMUNITY
Start: 2022-07-25 | End: 2022-10-02

## 2022-07-26 RX ORDER — ESCITALOPRAM OXALATE 10 MG/1
TABLET ORAL
COMMUNITY
Start: 2022-06-30

## 2022-07-26 RX ORDER — OMEPRAZOLE 20 MG/1
20 CAPSULE, DELAYED RELEASE ORAL DAILY
COMMUNITY
Start: 2022-06-29

## 2022-07-26 NOTE — PROGRESS NOTES
Inflammatory Bowel Disease        Established Patient Note         TODAY'S VISIT DATE:  9/2/2022  The patient's last visit with me was on 11/30/21    PCP: Eleonora Philip      History of Present Illness:  Mr. Gutierrez is a 22 year old male with ulcerative colitis on Entyvio 300mg every 4 weeks diagnosed in November 2020 here for follow-up.    His symptoms started in 2019 with rectal bleeding, diarrhea with associated urgency, tenesmus, and lower abdominal cramping. His weight was stable.     Blood work in September 2020 showed a normal albumin of 3.6. Mild leukocytosis of 11 with a hemoglobin of 9.5 g/dL. His MCV is 79.     Colonoscopy in November 2020 showed Torrez endo Score 2 inflammation (moderate, with marked erythema, absent vascular pattern, friability, erosions) from the rectum to the hepatic flexure. Normal TI, cecum, ascending colon. Biopsies showed moderate to marked chronic active colitis with cryptitis, crypt abscesses, epithelial erosion, increased chronic inflammation and architectural distortion. Right colon biopsies showed mild inflammation. He was started on Apriso 1.5 gm daily and canasa.    Received Feraheme x 2 in December 2020.     On follow-up in February 2021 he reported continued symptoms despite apriso 1.5 gm and uceris 9mg daily.   I stopped apriso and started vedolizumab monotherapy.    He had his induction doses on 03/09/21, 03/24/21, and 04/21/21. His week 14 trough was 12 in June 2021. He was changed to every 4 weeks due to ongoing symptoms and trough was 13 in August 2021 on q 4 weeks.    Initially he noticed less blood and frequency after his first 2 infusions but since then had continued symptoms of bleeding, urgency, diarrhea Infectious stool studies were negative. Fecal calprotectin was 2464 was in August 2021. He was given Uceris then prednisone taper.     Fecal calprotectin was 274 in October 2021. 831 in June 2022.  CRP 5.6  6/22.    He was  initiated on a Prednisone taper in June after reported worsening UC related symptoms.  Labs drawn at that time significant for worsening anemia and iron studies with H&H 8.4/28.7 on 6/17/22 (from from 9.4/31.2 on 11/29/21); iron level 7, iron saturation 2.  Patient was essentially non-compliant for the last several months with his tacrolimus suppositories despite known active disease of rectum and distal colon.  He does report compliance over the last several weeks though.      Today he reports chronic fatigue, 5-6 loose BM's/day, intermittent episodes of bloody stools.  He also reports urgency and tenesmus, with at least one episode of incontinence per month and episodes of waking up at night with urgency.     Of note, was treated for LE dermatitis vs infection by outside facility, currently taking doxy oral.       He denies regular NSAID use. He smokes cigars 2 times a week. No alcohol use. He denies a family history of IBD, colon polyps, or colon cancer.      IBD History:   IBD disease: UC    Disease location: pancolitis    Disease behavior: inflammatory      Current IBD Therapy:  Entyvio 300mg [id number]  Prednisone taper (1 week left)  Tacrolimus suppository     Therapeutic drug monitoring:   August 2021: [place] trough 13, Ab < 25 (every 4 weeks)    Prior IBD Therapies:  Apriso 1.5 gm daily  Canasa 1 gm nightly  Uceris 9mg daily  Predisone      Pertinent Endoscopy/Imaging:  November 11, 2020: Colonoscopy: Torrez Score 2 inflammation (moderate, with marked erythema, absent vascular pattern, friability, erosions) from the rectum to the hepatic flexure. Normal TI, cecum, ascending colon. Biopsies showed moderate to marked chronic active colitis with cryptitis, crypt abscesses, epithelial erosion, increased chronic inflammation and architectural distortion. Right colon biopsies showed mild inflammation.    December 13, 2021: Flex Sig: Moderately active (Torrez score 2) ulcerative colitis in rectum and distal sigmoid colon,  unchanged since last examination.  Inactive (Torrez Score 0) ulcerative colitis in proximal sigmoid colon and distal descending colon, improved since last exam    Prior Pertinent Surgeries:   None    Complications:   None    Extraintestinal Manifestations:  None      Review of Systems   Constitutional:  Positive for fatigue. Negative for appetite change and unexpected weight change.   HENT:  Negative for trouble swallowing.    Respiratory: Negative.     Cardiovascular: Negative.    Gastrointestinal:  Positive for abdominal pain, blood in stool, change in bowel habit, diarrhea, fecal incontinence and change in bowel habit. Negative for constipation.   Musculoskeletal: Negative.    Neurological: Negative.    Hematological: Negative.    Psychiatric/Behavioral: Negative.     All other systems reviewed and are negative.   Negative except as above     Medical/Surgical History:   Past Medical History:   Diagnosis Date    Acid reflux     ADD (attention deficit disorder)     Anxiety     Ulcerative colitis      Past Surgical History:   Procedure Laterality Date    COLONOSCOPY           Family History:   Family History   Problem Relation Age of Onset    Diverticulitis Mother     Lupus Maternal Aunt         Social History:   Social History     Socioeconomic History    Marital status: Unknown   Tobacco Use    Smoking status: Every Day     Packs/day: 0.50     Types: Cigars, Cigarettes    Smokeless tobacco: Never   Substance and Sexual Activity    Alcohol use: Yes     Comment: socially    Drug use: Yes     Types: Marijuana        Review of patient's allergies indicates:  No Known Allergies    Current Medications:   Outpatient Medications Marked as Taking for the 7/26/22 encounter (Office Visit) with Eleonora Philip MD   Medication Sig Dispense Refill    doxycycline (VIBRA-TABS) 100 MG tablet Take 100 mg by mouth 2 (two) times daily.      EScitalopram oxalate (LEXAPRO) 10 MG tablet       omeprazole (PRILOSEC) 20 MG capsule Take  "20 mg by mouth once daily.      [] predniSONE (DELTASONE) 10 MG tablet Take 4 tablets (40 mg total) by mouth once daily for 7 days, THEN 3 tablets (30 mg total) once daily for 7 days, THEN 2 tablets (20 mg total) once daily for 7 days, THEN 1.5 tablets (15 mg total) once daily for 7 days, THEN 1 tablet (10 mg total) once daily for 7 days, THEN 0.5 tablets (5 mg total) once daily for 7 days. 84 tablet 1    tacrolimus 1 mg GrPk INSERT 1 SUPPOSITORY RECTALLY EVERY NIGHT      TACROLIMUS, BULK, MISC Place 1 mg rectally every evening.          Vital Signs:  /66 (BP Location: Left arm, Patient Position: Sitting, BP Method: Medium (Automatic))   Pulse 69   Temp 98 °F (36.7 °C)   Resp 18   Ht 6' 1" (1.854 m)   Wt 95.3 kg (210 lb 3.2 oz)   BMI 27.73 kg/m²      Physical Exam  Constitutional:       General: He is not in acute distress.     Appearance: Normal appearance. He is not ill-appearing.   HENT:      Head: Normocephalic and atraumatic.   Cardiovascular:      Rate and Rhythm: Normal rate and regular rhythm.      Heart sounds: Normal heart sounds.   Pulmonary:      Effort: Pulmonary effort is normal. No respiratory distress.      Breath sounds: Normal breath sounds. No wheezing.   Abdominal:      General: Abdomen is flat. Bowel sounds are normal. There is no distension.      Palpations: Abdomen is soft. There is no mass.      Tenderness: There is no abdominal tenderness. There is no guarding.   Musculoskeletal:         General: No swelling, tenderness or signs of injury. Normal range of motion.      Right lower leg: No edema.      Left lower leg: No edema.   Skin:     General: Skin is warm and dry.      Coloration: Skin is pale.      Findings: No erythema.      Comments: Rash of bilateral LEs, appears consistent with contact dermatitis    Neurological:      General: No focal deficit present.      Mental Status: He is alert and oriented to person, place, and time.      Sensory: No sensory deficit.      " Motor: No weakness.   Psychiatric:         Mood and Affect: Mood normal.         Behavior: Behavior normal.         Thought Content: Thought content normal.         Judgment: Judgment normal.            Labs: Reviewed  Hgb   Date Value Ref Range Status   06/17/2022 8.4 (L) 14.0 - 18.0 gm/dL Final     Albumin Level   Date Value Ref Range Status   06/17/2022 3.3 (L) 3.5 - 5.0 gm/dL Final     Iron Level   Date Value Ref Range Status   06/17/2022 7 (L) 65 - 175 ug/dL Final     Ferritin Level   Date Value Ref Range Status   06/17/2022 4.28 (L) 21.81 - 274.66 ng/mL Final     Folate Level   Date Value Ref Range Status   06/17/2022 11.8 7.0 - 31.4 ng/mL Final     Vitamin B12 Level   Date Value Ref Range Status   06/17/2022 1,496 (H) 213 - 816 pg/mL Final     Vit D 25 OH   Date Value Ref Range Status   06/17/2022 73.6 30.0 - 80.0 ng/mL Final     C-Reactive Protein   Date Value Ref Range Status   06/17/2022 5.60 (H) <5.00 mg/L Final     Hepatitis B Surface Antigen   Date Value Ref Range Status   06/29/2022 Nonreactive Nonreactive Final        Fecal calproctectin: 831       Assessment/Plan:    Problem List Items Addressed This Visit          Oncology    Iron deficiency anemia due to chronic blood loss     Hgb 8.4 g/dL.  Iron 7.  Ferritin 4.28  Schedule iron infusions            GI    Ulcerative colitis with complication - Primary     Torrez endo 2 inflammation on colonoscopy in November 2020  Started Entyvio in March 2021. Received induction and week 14 trough was 12   Changed to every 4 weeks due to ongoing symptoms  Trough 13 in August 2021 on q 4 weeks  Fecal calprotectin 2464-->274 in October 2021 December 2021 Flex sig: Torrez 2 disease in rectosigmoid region, Torrez 0 in sigmoid and descending colon  Start topical therapy with Canasa following flex sig.    Tacrolimus suppositories then given with ongoing symptoms  Not compliant with tacrolimus suppositories  Given steroid taper in June for continued symptoms.  Fecal  calprotectin 831  Continue Entyvio 300mg every 4 weeks for now and encouraged compliance with tacrolimus suppositories  Plan close follow-up with repeat flex sig on compliant therapy to determine if change of therapy indicated                HEALTH MAINTENANCE:     Vaccinations:    Influenza (inactive):  recommended annually   PCV 13 (Prevnar): September 22, 2021  PPSV 23 (Pneumovax): 2-12 mos after PCV 13, repeat 5 years later and then after age 64 yo  Tetanus (TdaP): June 2013, recommended every 10 years  HPV (males and females ages 11-46 yo): received vaccination, UTD  Meningococcal: vaccinated May 2021  Hepatitis B: not immune  Hepatitis A:  not immune  MMR (live vaccine): UTD, will check immunity          Chickenpox status/Varicella (live vaccine):  received vaccination, UTD  Shingrix: recommended   COVID-19: recommended     Colorectal cancer risk:  Risk factors: > 8 years of disease, > 1/3 colon involved  - Distribution of colonic disease: > 1/3 of colon  - Year of symptom onset: 2020  - Colonoscopy every 1-2 years after endoscopic remission    Ophthalmologic exam recommended yearly  Dermatologic exam recommended yearly due to risk of skin cancer with IBD/meds    Bone health:  Calcium 5698-5944 mg daily and vitamin D 1000 IU daily  Risk factors for osteopenia/osteoporosis: Uceris, UC, prednisone  Vitamin D: 73.6    DEXA scan: recommend baseline      Smoking status: Smokes cigars several times/week, marijuana use daily

## 2022-07-27 RX ORDER — HEPARIN 100 UNIT/ML
500 SYRINGE INTRAVENOUS
Status: CANCELLED | OUTPATIENT
Start: 2022-07-27

## 2022-07-27 RX ORDER — SODIUM CHLORIDE 9 MG/ML
10 INJECTION, SOLUTION INTRAMUSCULAR; INTRAVENOUS; SUBCUTANEOUS
Status: CANCELLED | OUTPATIENT
Start: 2022-07-27

## 2022-07-27 RX ORDER — ACETAMINOPHEN 500 MG
1000 TABLET ORAL
Status: CANCELLED | OUTPATIENT
Start: 2022-07-27

## 2022-07-29 RX ORDER — DIPHENHYDRAMINE HYDROCHLORIDE 50 MG/ML
50 INJECTION INTRAMUSCULAR; INTRAVENOUS ONCE AS NEEDED
Status: CANCELLED | OUTPATIENT
Start: 2022-07-29

## 2022-07-29 RX ORDER — METHYLPREDNISOLONE SOD SUCC 125 MG
125 VIAL (EA) INJECTION ONCE AS NEEDED
Status: CANCELLED | OUTPATIENT
Start: 2022-07-29

## 2022-07-29 RX ORDER — SODIUM CHLORIDE 0.9 % (FLUSH) 0.9 %
10 SYRINGE (ML) INJECTION
Status: CANCELLED | OUTPATIENT
Start: 2022-07-29

## 2022-07-29 RX ORDER — EPINEPHRINE 0.3 MG/.3ML
0.3 INJECTION SUBCUTANEOUS ONCE AS NEEDED
Status: CANCELLED | OUTPATIENT
Start: 2022-07-29

## 2022-07-29 RX ORDER — HEPARIN 100 UNIT/ML
500 SYRINGE INTRAVENOUS
Status: CANCELLED | OUTPATIENT
Start: 2022-07-29

## 2022-08-02 ENCOUNTER — TELEPHONE (OUTPATIENT)
Dept: GASTROENTEROLOGY | Facility: CLINIC | Age: 22
End: 2022-08-02
Payer: MEDICAID

## 2022-08-02 NOTE — TELEPHONE ENCOUNTER
----- Message from Eleonora Philip MD sent at 8/1/2022  8:02 AM CDT -----    ----- Message -----  From: Luz Maria Back MA  Sent: 7/29/2022  11:20 AM CDT  To: Eleonora Philip MD, Grzegorz Buck RN    Pt was ordered for Iron Dextran (Infed) due to Iron deficiency anemia due to chronic blood loss. The Auth has been approved for this drug but, unfortunately at this time there is a national shortage. There are a few other iron options that may be available for this patient. Please reach out to Out patient Pharm at [phone] with any drug questions. We can get her scheduled and infused once we get new orders.

## 2022-08-12 ENCOUNTER — INFUSION (OUTPATIENT)
Dept: INFUSION THERAPY | Facility: HOSPITAL | Age: 22
End: 2022-08-12
Attending: INTERNAL MEDICINE
Payer: MEDICAID

## 2022-08-12 DIAGNOSIS — D50.0 IRON DEFICIENCY ANEMIA DUE TO CHRONIC BLOOD LOSS: Primary | ICD-10-CM

## 2022-08-12 DIAGNOSIS — K51.919 ULCERATIVE COLITIS WITH COMPLICATION, UNSPECIFIED LOCATION: ICD-10-CM

## 2022-08-12 PROCEDURE — 96365 THER/PROPH/DIAG IV INF INIT: CPT

## 2022-08-12 PROCEDURE — 96367 TX/PROPH/DG ADDL SEQ IV INF: CPT

## 2022-08-12 PROCEDURE — 63600175 PHARM REV CODE 636 W HCPCS: Performed by: INTERNAL MEDICINE

## 2022-08-12 PROCEDURE — 25000003 PHARM REV CODE 250: Performed by: INTERNAL MEDICINE

## 2022-08-12 RX ORDER — HEPARIN 100 UNIT/ML
500 SYRINGE INTRAVENOUS
Status: CANCELLED | OUTPATIENT
Start: 2022-10-01

## 2022-08-12 RX ORDER — ACETAMINOPHEN 325 MG/1
650 TABLET ORAL
Status: CANCELLED | OUTPATIENT
Start: 2022-10-01

## 2022-08-12 RX ORDER — DIPHENHYDRAMINE HYDROCHLORIDE 50 MG/ML
50 INJECTION INTRAMUSCULAR; INTRAVENOUS ONCE AS NEEDED
Status: CANCELLED | OUTPATIENT
Start: 2022-08-19

## 2022-08-12 RX ORDER — METHYLPREDNISOLONE SOD SUCC 125 MG
40 VIAL (EA) INJECTION
Status: ACTIVE | OUTPATIENT
Start: 2022-08-12 | End: 2022-08-12

## 2022-08-12 RX ORDER — HEPARIN 100 UNIT/ML
500 SYRINGE INTRAVENOUS
Status: CANCELLED | OUTPATIENT
Start: 2022-08-19

## 2022-08-12 RX ORDER — DIPHENHYDRAMINE HYDROCHLORIDE 50 MG/ML
25 INJECTION INTRAMUSCULAR; INTRAVENOUS
Status: ACTIVE | OUTPATIENT
Start: 2022-08-12 | End: 2022-08-12

## 2022-08-12 RX ORDER — SODIUM CHLORIDE 0.9 % (FLUSH) 0.9 %
10 SYRINGE (ML) INJECTION
Status: CANCELLED | OUTPATIENT
Start: 2022-08-19

## 2022-08-12 RX ORDER — DIPHENHYDRAMINE HYDROCHLORIDE 50 MG/ML
25 INJECTION INTRAMUSCULAR; INTRAVENOUS
Status: CANCELLED | OUTPATIENT
Start: 2022-10-01

## 2022-08-12 RX ORDER — IPRATROPIUM BROMIDE AND ALBUTEROL SULFATE 2.5; .5 MG/3ML; MG/3ML
3 SOLUTION RESPIRATORY (INHALATION)
Status: ACTIVE | OUTPATIENT
Start: 2022-08-12 | End: 2022-08-12

## 2022-08-12 RX ORDER — ACETAMINOPHEN 325 MG/1
650 TABLET ORAL
Status: ACTIVE | OUTPATIENT
Start: 2022-08-12 | End: 2022-08-12

## 2022-08-12 RX ORDER — METHYLPREDNISOLONE SOD SUCC 125 MG
40 VIAL (EA) INJECTION
Status: CANCELLED | OUTPATIENT
Start: 2022-10-01

## 2022-08-12 RX ORDER — EPINEPHRINE 1 MG/ML
0.3 INJECTION, SOLUTION, CONCENTRATE INTRAVENOUS
Status: CANCELLED | OUTPATIENT
Start: 2022-10-01

## 2022-08-12 RX ORDER — IPRATROPIUM BROMIDE AND ALBUTEROL SULFATE 2.5; .5 MG/3ML; MG/3ML
3 SOLUTION RESPIRATORY (INHALATION)
Status: CANCELLED | OUTPATIENT
Start: 2022-10-01

## 2022-08-12 RX ORDER — METHYLPREDNISOLONE SOD SUCC 125 MG
125 VIAL (EA) INJECTION ONCE AS NEEDED
Status: CANCELLED | OUTPATIENT
Start: 2022-08-19

## 2022-08-12 RX ORDER — SODIUM CHLORIDE 0.9 % (FLUSH) 0.9 %
10 SYRINGE (ML) INJECTION
Status: CANCELLED | OUTPATIENT
Start: 2022-10-01

## 2022-08-12 RX ORDER — EPINEPHRINE 0.3 MG/.3ML
0.3 INJECTION SUBCUTANEOUS ONCE AS NEEDED
Status: CANCELLED | OUTPATIENT
Start: 2022-08-19

## 2022-08-12 RX ORDER — EPINEPHRINE 1 MG/ML
0.3 INJECTION, SOLUTION, CONCENTRATE INTRAVENOUS
Status: ACTIVE | OUTPATIENT
Start: 2022-08-12 | End: 2022-08-12

## 2022-08-12 RX ADMIN — SODIUM CHLORIDE 125 MG: 9 INJECTION, SOLUTION INTRAVENOUS at 12:08

## 2022-08-12 RX ADMIN — VEDOLIZUMAB 300 MG: 300 INJECTION, POWDER, LYOPHILIZED, FOR SOLUTION INTRAVENOUS at 12:08

## 2022-08-19 ENCOUNTER — INFUSION (OUTPATIENT)
Dept: INFUSION THERAPY | Facility: HOSPITAL | Age: 22
End: 2022-08-19
Attending: INTERNAL MEDICINE
Payer: MEDICAID

## 2022-08-19 VITALS
HEIGHT: 73 IN | WEIGHT: 206.13 LBS | DIASTOLIC BLOOD PRESSURE: 62 MMHG | RESPIRATION RATE: 20 BRPM | BODY MASS INDEX: 27.32 KG/M2 | TEMPERATURE: 99 F | OXYGEN SATURATION: 100 % | HEART RATE: 72 BPM | SYSTOLIC BLOOD PRESSURE: 106 MMHG

## 2022-08-19 DIAGNOSIS — D50.0 IRON DEFICIENCY ANEMIA DUE TO CHRONIC BLOOD LOSS: ICD-10-CM

## 2022-08-19 DIAGNOSIS — K51.919 ULCERATIVE COLITIS WITH COMPLICATION, UNSPECIFIED LOCATION: Primary | ICD-10-CM

## 2022-08-19 PROCEDURE — 25000003 PHARM REV CODE 250: Performed by: INTERNAL MEDICINE

## 2022-08-19 PROCEDURE — 63600175 PHARM REV CODE 636 W HCPCS: Performed by: INTERNAL MEDICINE

## 2022-08-19 PROCEDURE — 96365 THER/PROPH/DIAG IV INF INIT: CPT

## 2022-08-19 RX ORDER — HEPARIN 100 UNIT/ML
500 SYRINGE INTRAVENOUS
Status: CANCELLED | OUTPATIENT
Start: 2022-08-26

## 2022-08-19 RX ORDER — HEPARIN 100 UNIT/ML
500 SYRINGE INTRAVENOUS
Status: CANCELLED | OUTPATIENT
Start: 2022-09-09

## 2022-08-19 RX ORDER — EPINEPHRINE 0.3 MG/.3ML
0.3 INJECTION SUBCUTANEOUS ONCE AS NEEDED
Status: CANCELLED | OUTPATIENT
Start: 2022-08-26

## 2022-08-19 RX ORDER — SODIUM CHLORIDE 0.9 % (FLUSH) 0.9 %
10 SYRINGE (ML) INJECTION
Status: CANCELLED | OUTPATIENT
Start: 2022-09-09

## 2022-08-19 RX ORDER — EPINEPHRINE 1 MG/ML
0.3 INJECTION, SOLUTION, CONCENTRATE INTRAVENOUS
Status: CANCELLED | OUTPATIENT
Start: 2022-09-09

## 2022-08-19 RX ORDER — SODIUM CHLORIDE 0.9 % (FLUSH) 0.9 %
10 SYRINGE (ML) INJECTION
Status: DISCONTINUED | OUTPATIENT
Start: 2022-08-19 | End: 2022-08-19 | Stop reason: HOSPADM

## 2022-08-19 RX ORDER — IPRATROPIUM BROMIDE AND ALBUTEROL SULFATE 2.5; .5 MG/3ML; MG/3ML
3 SOLUTION RESPIRATORY (INHALATION)
Status: CANCELLED | OUTPATIENT
Start: 2022-09-09

## 2022-08-19 RX ORDER — DIPHENHYDRAMINE HYDROCHLORIDE 50 MG/ML
25 INJECTION INTRAMUSCULAR; INTRAVENOUS
Status: CANCELLED | OUTPATIENT
Start: 2022-09-09

## 2022-08-19 RX ORDER — METHYLPREDNISOLONE SOD SUCC 125 MG
40 VIAL (EA) INJECTION
Status: CANCELLED | OUTPATIENT
Start: 2022-09-09

## 2022-08-19 RX ORDER — HEPARIN 100 UNIT/ML
500 SYRINGE INTRAVENOUS
Status: DISCONTINUED | OUTPATIENT
Start: 2022-08-19 | End: 2022-08-19 | Stop reason: HOSPADM

## 2022-08-19 RX ORDER — DIPHENHYDRAMINE HYDROCHLORIDE 50 MG/ML
50 INJECTION INTRAMUSCULAR; INTRAVENOUS ONCE AS NEEDED
Status: CANCELLED | OUTPATIENT
Start: 2022-08-26

## 2022-08-19 RX ORDER — ACETAMINOPHEN 325 MG/1
650 TABLET ORAL
Status: CANCELLED | OUTPATIENT
Start: 2022-09-09

## 2022-08-19 RX ORDER — METHYLPREDNISOLONE SOD SUCC 125 MG
125 VIAL (EA) INJECTION ONCE AS NEEDED
Status: CANCELLED | OUTPATIENT
Start: 2022-08-26

## 2022-08-19 RX ORDER — SODIUM CHLORIDE 0.9 % (FLUSH) 0.9 %
10 SYRINGE (ML) INJECTION
Status: CANCELLED | OUTPATIENT
Start: 2022-08-26

## 2022-08-19 RX ADMIN — SODIUM CHLORIDE 125 MG: 9 INJECTION, SOLUTION INTRAVENOUS at 12:08

## 2022-08-19 RX ADMIN — SODIUM CHLORIDE: 9 INJECTION, SOLUTION INTRAVENOUS at 01:08

## 2022-08-26 ENCOUNTER — INFUSION (OUTPATIENT)
Dept: INFUSION THERAPY | Facility: HOSPITAL | Age: 22
End: 2022-08-26
Attending: INTERNAL MEDICINE
Payer: MEDICAID

## 2022-08-26 VITALS — RESPIRATION RATE: 20 BRPM | HEIGHT: 73 IN | BODY MASS INDEX: 27.2 KG/M2

## 2022-08-26 DIAGNOSIS — D50.0 IRON DEFICIENCY ANEMIA DUE TO CHRONIC BLOOD LOSS: Primary | ICD-10-CM

## 2022-08-26 PROCEDURE — 25000003 PHARM REV CODE 250: Performed by: INTERNAL MEDICINE

## 2022-08-26 PROCEDURE — 96365 THER/PROPH/DIAG IV INF INIT: CPT

## 2022-08-26 PROCEDURE — 63600175 PHARM REV CODE 636 W HCPCS: Performed by: INTERNAL MEDICINE

## 2022-08-26 RX ORDER — EPINEPHRINE 0.3 MG/.3ML
0.3 INJECTION SUBCUTANEOUS ONCE AS NEEDED
Status: CANCELLED | OUTPATIENT
Start: 2022-09-02

## 2022-08-26 RX ORDER — HEPARIN 100 UNIT/ML
500 SYRINGE INTRAVENOUS
Status: CANCELLED | OUTPATIENT
Start: 2022-09-02

## 2022-08-26 RX ORDER — SODIUM CHLORIDE 0.9 % (FLUSH) 0.9 %
10 SYRINGE (ML) INJECTION
Status: CANCELLED | OUTPATIENT
Start: 2022-09-02

## 2022-08-26 RX ORDER — METHYLPREDNISOLONE SOD SUCC 125 MG
125 VIAL (EA) INJECTION ONCE AS NEEDED
Status: DISCONTINUED | OUTPATIENT
Start: 2022-08-26 | End: 2022-08-26 | Stop reason: HOSPADM

## 2022-08-26 RX ORDER — EPINEPHRINE 0.3 MG/.3ML
0.3 INJECTION SUBCUTANEOUS ONCE AS NEEDED
Status: DISCONTINUED | OUTPATIENT
Start: 2022-08-26 | End: 2022-08-26 | Stop reason: HOSPADM

## 2022-08-26 RX ORDER — METHYLPREDNISOLONE SOD SUCC 125 MG
125 VIAL (EA) INJECTION ONCE AS NEEDED
Status: CANCELLED | OUTPATIENT
Start: 2022-09-02

## 2022-08-26 RX ORDER — SODIUM CHLORIDE 0.9 % (FLUSH) 0.9 %
10 SYRINGE (ML) INJECTION
Status: DISCONTINUED | OUTPATIENT
Start: 2022-08-26 | End: 2022-08-26 | Stop reason: HOSPADM

## 2022-08-26 RX ORDER — DIPHENHYDRAMINE HYDROCHLORIDE 50 MG/ML
50 INJECTION INTRAMUSCULAR; INTRAVENOUS ONCE AS NEEDED
Status: CANCELLED | OUTPATIENT
Start: 2022-09-02

## 2022-08-26 RX ORDER — DIPHENHYDRAMINE HYDROCHLORIDE 50 MG/ML
50 INJECTION INTRAMUSCULAR; INTRAVENOUS ONCE AS NEEDED
Status: DISCONTINUED | OUTPATIENT
Start: 2022-08-26 | End: 2022-08-26 | Stop reason: HOSPADM

## 2022-08-26 RX ADMIN — SODIUM CHLORIDE 125 MG: 9 INJECTION, SOLUTION INTRAVENOUS at 11:08

## 2022-09-02 ENCOUNTER — INFUSION (OUTPATIENT)
Dept: INFUSION THERAPY | Facility: HOSPITAL | Age: 22
End: 2022-09-02
Attending: INTERNAL MEDICINE
Payer: MEDICAID

## 2022-09-02 VITALS
HEIGHT: 73 IN | HEART RATE: 62 BPM | RESPIRATION RATE: 20 BRPM | OXYGEN SATURATION: 99 % | WEIGHT: 208.13 LBS | BODY MASS INDEX: 27.59 KG/M2 | TEMPERATURE: 98 F | DIASTOLIC BLOOD PRESSURE: 56 MMHG | SYSTOLIC BLOOD PRESSURE: 115 MMHG

## 2022-09-02 DIAGNOSIS — D50.0 IRON DEFICIENCY ANEMIA DUE TO CHRONIC BLOOD LOSS: Primary | ICD-10-CM

## 2022-09-02 PROCEDURE — 63600175 PHARM REV CODE 636 W HCPCS: Performed by: INTERNAL MEDICINE

## 2022-09-02 PROCEDURE — 96365 THER/PROPH/DIAG IV INF INIT: CPT

## 2022-09-02 PROCEDURE — 25000003 PHARM REV CODE 250: Performed by: INTERNAL MEDICINE

## 2022-09-02 RX ORDER — METHYLPREDNISOLONE SOD SUCC 125 MG
125 VIAL (EA) INJECTION ONCE AS NEEDED
Status: CANCELLED | OUTPATIENT
Start: 2022-09-09

## 2022-09-02 RX ORDER — SODIUM CHLORIDE 0.9 % (FLUSH) 0.9 %
10 SYRINGE (ML) INJECTION
Status: CANCELLED | OUTPATIENT
Start: 2022-09-09

## 2022-09-02 RX ORDER — EPINEPHRINE 0.3 MG/.3ML
0.3 INJECTION SUBCUTANEOUS ONCE AS NEEDED
Status: CANCELLED | OUTPATIENT
Start: 2022-09-09

## 2022-09-02 RX ORDER — SODIUM CHLORIDE 0.9 % (FLUSH) 0.9 %
10 SYRINGE (ML) INJECTION
Status: DISCONTINUED | OUTPATIENT
Start: 2022-09-02 | End: 2022-09-02 | Stop reason: HOSPADM

## 2022-09-02 RX ORDER — HEPARIN 100 UNIT/ML
500 SYRINGE INTRAVENOUS
Status: CANCELLED | OUTPATIENT
Start: 2022-09-09

## 2022-09-02 RX ORDER — DIPHENHYDRAMINE HYDROCHLORIDE 50 MG/ML
50 INJECTION INTRAMUSCULAR; INTRAVENOUS ONCE AS NEEDED
Status: CANCELLED | OUTPATIENT
Start: 2022-09-09

## 2022-09-02 RX ADMIN — SODIUM CHLORIDE 125 MG: 9 INJECTION, SOLUTION INTRAVENOUS at 12:09

## 2022-09-02 RX ADMIN — SODIUM CHLORIDE: 9 INJECTION, SOLUTION INTRAVENOUS at 12:09

## 2022-09-02 NOTE — ASSESSMENT & PLAN NOTE
Torrez endo 2 inflammation on colonoscopy in November 2020  Started Entyvio in March 2021. Received induction and week 14 trough was 12   Changed to every 4 weeks due to ongoing symptoms  Trough 13 in August 2021 on q 4 weeks  Fecal calprotectin 2464-->274 in October 2021 December 2021 Flex sig: Torrez 2 disease in rectosigmoid region, Torrez 0 in sigmoid and descending colon  Start topical therapy with Canasa following flex sig.    Tacrolimus suppositories then given with ongoing symptoms  Not compliant with tacrolimus suppositories  Given steroid taper in June for continued symptoms.  Fecal calprotectin 831  Continue Entyvio 300mg every 4 weeks for now and encouraged compliance with tacrolimus suppositories  Plan close follow-up with repeat flex sig on compliant therapy to determine if change of therapy indicated

## 2022-09-09 ENCOUNTER — INFUSION (OUTPATIENT)
Dept: INFUSION THERAPY | Facility: HOSPITAL | Age: 22
End: 2022-09-09
Attending: INTERNAL MEDICINE
Payer: MEDICAID

## 2022-09-09 VITALS
RESPIRATION RATE: 20 BRPM | BODY MASS INDEX: 27.47 KG/M2 | OXYGEN SATURATION: 99 % | SYSTOLIC BLOOD PRESSURE: 118 MMHG | TEMPERATURE: 99 F | DIASTOLIC BLOOD PRESSURE: 73 MMHG | WEIGHT: 207.25 LBS | HEART RATE: 70 BPM | HEIGHT: 73 IN

## 2022-09-09 DIAGNOSIS — K51.919 ULCERATIVE COLITIS WITH COMPLICATION, UNSPECIFIED LOCATION: Primary | ICD-10-CM

## 2022-09-09 PROCEDURE — 25000003 PHARM REV CODE 250: Performed by: INTERNAL MEDICINE

## 2022-09-09 PROCEDURE — 63600175 PHARM REV CODE 636 W HCPCS: Mod: JG | Performed by: INTERNAL MEDICINE

## 2022-09-09 PROCEDURE — 96365 THER/PROPH/DIAG IV INF INIT: CPT

## 2022-09-09 RX ORDER — DIPHENHYDRAMINE HYDROCHLORIDE 50 MG/ML
25 INJECTION INTRAMUSCULAR; INTRAVENOUS
Status: CANCELLED | OUTPATIENT
Start: 2022-10-07

## 2022-09-09 RX ORDER — SODIUM CHLORIDE 0.9 % (FLUSH) 0.9 %
10 SYRINGE (ML) INJECTION
Status: DISCONTINUED | OUTPATIENT
Start: 2022-09-09 | End: 2022-09-09 | Stop reason: HOSPADM

## 2022-09-09 RX ORDER — EPINEPHRINE 1 MG/ML
0.3 INJECTION, SOLUTION, CONCENTRATE INTRAVENOUS
Status: CANCELLED | OUTPATIENT
Start: 2022-10-07

## 2022-09-09 RX ORDER — HEPARIN 100 UNIT/ML
500 SYRINGE INTRAVENOUS
Status: DISCONTINUED | OUTPATIENT
Start: 2022-09-09 | End: 2022-09-09 | Stop reason: HOSPADM

## 2022-09-09 RX ORDER — SODIUM CHLORIDE 0.9 % (FLUSH) 0.9 %
10 SYRINGE (ML) INJECTION
Status: CANCELLED | OUTPATIENT
Start: 2022-10-07

## 2022-09-09 RX ORDER — ACETAMINOPHEN 325 MG/1
650 TABLET ORAL
Status: CANCELLED | OUTPATIENT
Start: 2022-10-07

## 2022-09-09 RX ORDER — IPRATROPIUM BROMIDE AND ALBUTEROL SULFATE 2.5; .5 MG/3ML; MG/3ML
3 SOLUTION RESPIRATORY (INHALATION)
Status: CANCELLED | OUTPATIENT
Start: 2022-10-07

## 2022-09-09 RX ORDER — HEPARIN 100 UNIT/ML
500 SYRINGE INTRAVENOUS
Status: CANCELLED | OUTPATIENT
Start: 2022-10-07

## 2022-09-09 RX ORDER — METHYLPREDNISOLONE SOD SUCC 125 MG
40 VIAL (EA) INJECTION
Status: CANCELLED | OUTPATIENT
Start: 2022-10-07

## 2022-09-09 RX ADMIN — VEDOLIZUMAB 300 MG: 300 INJECTION, POWDER, LYOPHILIZED, FOR SOLUTION INTRAVENOUS at 12:09

## 2022-09-09 RX ADMIN — SODIUM CHLORIDE: 9 INJECTION, SOLUTION INTRAVENOUS at 01:09

## 2022-09-09 NOTE — NURSING
Pt has completed ferrlecit doses.  Pt here for entyvio q 4 weeks.  Pt reports abd pain 6/10 and diarrhea.  Pt eating lunch.

## 2022-10-02 ENCOUNTER — HOSPITAL ENCOUNTER (EMERGENCY)
Facility: HOSPITAL | Age: 22
Discharge: HOME | End: 2022-10-02
Attending: FAMILY MEDICINE
Payer: MEDICAID

## 2022-10-02 VITALS
SYSTOLIC BLOOD PRESSURE: 121 MMHG | HEIGHT: 73 IN | RESPIRATION RATE: 18 BRPM | HEART RATE: 81 BPM | BODY MASS INDEX: 28.62 KG/M2 | WEIGHT: 215.94 LBS | TEMPERATURE: 99 F | OXYGEN SATURATION: 99 % | DIASTOLIC BLOOD PRESSURE: 60 MMHG

## 2022-10-02 DIAGNOSIS — M54.32 LEFT SCIATIC NERVE PAIN: Primary | ICD-10-CM

## 2022-10-02 LAB
ALBUMIN SERPL-MCNC: 3.6 GM/DL (ref 3.5–5)
ALBUMIN/GLOB SERPL: 1.1 RATIO (ref 1.1–2)
ALP SERPL-CCNC: 73 UNIT/L (ref 40–150)
ALT SERPL-CCNC: 12 UNIT/L (ref 0–55)
APPEARANCE UR: CLEAR
AST SERPL-CCNC: 10 UNIT/L (ref 5–34)
BACTERIA #/AREA URNS AUTO: ABNORMAL /HPF
BASOPHILS # BLD AUTO: 0.05 X10(3)/MCL (ref 0–0.2)
BASOPHILS NFR BLD AUTO: 0.5 %
BILIRUB UR QL STRIP.AUTO: NEGATIVE MG/DL
BILIRUBIN DIRECT+TOT PNL SERPL-MCNC: 0.2 MG/DL
BUN SERPL-MCNC: 11.6 MG/DL (ref 8.9–20.6)
CALCIUM SERPL-MCNC: 8.9 MG/DL (ref 8.4–10.2)
CHLORIDE SERPL-SCNC: 111 MMOL/L (ref 98–107)
CO2 SERPL-SCNC: 22 MMOL/L (ref 22–29)
COLOR UR AUTO: ABNORMAL
CREAT SERPL-MCNC: 0.83 MG/DL (ref 0.73–1.18)
EOSINOPHIL # BLD AUTO: 1.9 X10(3)/MCL (ref 0–0.9)
EOSINOPHIL NFR BLD AUTO: 20 %
ERYTHROCYTE [DISTWIDTH] IN BLOOD BY AUTOMATED COUNT: 20.4 % (ref 11.5–17)
GFR SERPLBLD CREATININE-BSD FMLA CKD-EPI: >60 MLS/MIN/1.73/M2
GLOBULIN SER-MCNC: 3.2 GM/DL (ref 2.4–3.5)
GLUCOSE SERPL-MCNC: 98 MG/DL (ref 74–100)
GLUCOSE UR QL STRIP.AUTO: NORMAL MG/DL
HCT VFR BLD AUTO: 27.5 % (ref 42–52)
HGB BLD-MCNC: 7.5 GM/DL (ref 14–18)
HYALINE CASTS #/AREA URNS LPF: ABNORMAL /LPF
IMM GRANULOCYTES # BLD AUTO: 0.02 X10(3)/MCL (ref 0–0.04)
IMM GRANULOCYTES NFR BLD AUTO: 0.2 %
KETONES UR QL STRIP.AUTO: ABNORMAL MG/DL
LEUKOCYTE ESTERASE UR QL STRIP.AUTO: NEGATIVE UNIT/L
LYMPHOCYTES # BLD AUTO: 3.05 X10(3)/MCL (ref 0.6–4.6)
LYMPHOCYTES NFR BLD AUTO: 32.2 %
MCH RBC QN AUTO: 18.8 PG (ref 27–31)
MCHC RBC AUTO-ENTMCNC: 27.3 MG/DL (ref 33–36)
MCV RBC AUTO: 68.9 FL (ref 80–94)
MONOCYTES # BLD AUTO: 0.68 X10(3)/MCL (ref 0.1–1.3)
MONOCYTES NFR BLD AUTO: 7.2 %
MUCOUS THREADS URNS QL MICRO: ABNORMAL /LPF
NEUTROPHILS # BLD AUTO: 3.8 X10(3)/MCL (ref 2.1–9.2)
NEUTROPHILS NFR BLD AUTO: 39.9 %
NITRITE UR QL STRIP.AUTO: NEGATIVE
NRBC BLD AUTO-RTO: 0 %
PH UR STRIP.AUTO: 6 [PH]
PLATELET # BLD AUTO: 537 X10(3)/MCL (ref 130–400)
PMV BLD AUTO: 9.4 FL (ref 7.4–10.4)
POTASSIUM SERPL-SCNC: 4 MMOL/L (ref 3.5–5.1)
PROT SERPL-MCNC: 6.8 GM/DL (ref 6.4–8.3)
PROT UR QL STRIP.AUTO: NEGATIVE MG/DL
RBC # BLD AUTO: 3.99 X10(6)/MCL (ref 4.7–6.1)
RBC #/AREA URNS AUTO: ABNORMAL /HPF
RBC UR QL AUTO: NEGATIVE UNIT/L
SODIUM SERPL-SCNC: 141 MMOL/L (ref 136–145)
SP GR UR STRIP.AUTO: 1.03
SQUAMOUS #/AREA URNS LPF: ABNORMAL /HPF
UROBILINOGEN UR STRIP-ACNC: NORMAL MG/DL
WBC # SPEC AUTO: 9.5 X10(3)/MCL (ref 4.5–11.5)
WBC #/AREA URNS AUTO: ABNORMAL /HPF

## 2022-10-02 PROCEDURE — 80053 COMPREHEN METABOLIC PANEL: CPT | Performed by: PHYSICIAN ASSISTANT

## 2022-10-02 PROCEDURE — 36415 COLL VENOUS BLD VENIPUNCTURE: CPT | Performed by: PHYSICIAN ASSISTANT

## 2022-10-02 PROCEDURE — 85025 COMPLETE CBC W/AUTO DIFF WBC: CPT | Performed by: PHYSICIAN ASSISTANT

## 2022-10-02 PROCEDURE — 99284 EMERGENCY DEPT VISIT MOD MDM: CPT

## 2022-10-02 PROCEDURE — 81001 URINALYSIS AUTO W/SCOPE: CPT | Performed by: PHYSICIAN ASSISTANT

## 2022-10-02 RX ORDER — PREDNISONE 20 MG/1
20 TABLET ORAL DAILY
Qty: 5 TABLET | Refills: 0 | Status: SHIPPED | OUTPATIENT
Start: 2022-10-02 | End: 2022-10-07

## 2022-10-02 RX ORDER — HYDROCODONE BITARTRATE AND ACETAMINOPHEN 5; 325 MG/1; MG/1
1 TABLET ORAL EVERY 6 HOURS PRN
Qty: 12 TABLET | Refills: 0 | Status: SHIPPED | OUTPATIENT
Start: 2022-10-02 | End: 2022-12-13 | Stop reason: ALTCHOICE

## 2022-10-02 NOTE — Clinical Note
"Saji Cao" Matt was seen and treated in our emergency department on 10/2/2022.  He may return to work on 10/05/2022.       If you have any questions or concerns, please don't hesitate to call.      TIERNEY Figueroa"

## 2022-10-03 NOTE — DISCHARGE INSTRUCTIONS
Take medications as prescribed as needed for pain.  Follow up with your primary provider within 1 week.

## 2022-10-03 NOTE — ED PROVIDER NOTES
Encounter Date: 10/2/2022       History     Chief Complaint   Patient presents with    Back Pain     States left lower back radiating down leg.  States was seen in Urgent Care on Tuesday and given meds.  States not working.       Patient is a 22-year-old male with a history of ulcerative colitis who presents to the emergency department with complaints of left lower back pain radiating down left leg ( down to behind his knee) x 1 week.   He states he was seen on urgent care on Tuesday, given 2 injections and prescribed muscle relaxers.  He states the pain has decreased some however he is currently at a 5/10 pain.  Patient states he is not working and requesting more medicine to help with the pain.  He denies known injury, fever, chills, shortness of breath, nausea, vomiting.      The history is provided by the patient. No  was used.   Back Pain   This is a new problem. Illness onset: x 1 week. The problem occurs throughout the day. The problem has been gradually improving. The pain is associated with no known injury. The pain is present in the lumbar spine. The quality of the pain is described as shooting. The pain radiates to the left leg. The pain is at a severity of 5/10. Associated symptoms include leg pain (left). Pertinent negatives include no chest pain, no fever, no numbness, no headaches, no abdominal pain, no bowel incontinence, no perianal numbness, no bladder incontinence, no dysuria, no paresthesias, no paresis, no tingling and no weakness. He has tried muscle relaxants for the symptoms. The treatment provided mild relief.   Review of patient's allergies indicates:  No Known Allergies  Past Medical History:   Diagnosis Date    Acid reflux     ADD (attention deficit disorder)     Anxiety     Ulcerative colitis      Past Surgical History:   Procedure Laterality Date    COLONOSCOPY       Family History   Problem Relation Age of Onset    Diverticulitis Mother     Lupus Maternal Aunt       Social History     Tobacco Use    Smoking status: Every Day     Packs/day: 0.50     Types: Cigars, Cigarettes    Smokeless tobacco: Never   Substance Use Topics    Alcohol use: Yes     Comment: socially    Drug use: Yes     Types: Marijuana     Review of Systems   Constitutional:  Negative for chills and fever.   Respiratory:  Negative for cough and shortness of breath.    Cardiovascular:  Negative for chest pain and palpitations.   Gastrointestinal:  Negative for abdominal pain, bowel incontinence, diarrhea, nausea and vomiting.   Genitourinary:  Negative for bladder incontinence and dysuria.   Musculoskeletal:  Positive for back pain (Lower back pain, radiating to left leg).   Skin: Negative.    Neurological:  Negative for tingling, weakness, numbness, headaches and paresthesias.   Hematological: Negative.      Physical Exam     Initial Vitals [10/02/22 2024]   BP Pulse Resp Temp SpO2   137/71 70 17 98.6 °F (37 °C) 100 %      MAP       --         Physical Exam    Nursing note and vitals reviewed.  Constitutional: He appears well-developed and well-nourished.   HENT:   Nose: Nose normal.   Mouth/Throat: Oropharynx is clear and moist.   Eyes: Conjunctivae are normal.   Neck: Neck supple.   Normal range of motion.  Cardiovascular:  Normal rate, normal heart sounds and intact distal pulses.           Pulmonary/Chest: Breath sounds normal.   Abdominal: Abdomen is soft. Bowel sounds are normal. There is no abdominal tenderness.   Musculoskeletal:         General: No tenderness. Normal range of motion.      Cervical back: Normal range of motion and neck supple. No bony tenderness.      Thoracic back: No bony tenderness.      Lumbar back: No bony tenderness.     Neurological: He is alert and oriented to person, place, and time. He has normal strength.   Skin: Skin is warm.       ED Course   Procedures  Labs Reviewed   URINALYSIS, REFLEX TO URINE CULTURE - Abnormal; Notable for the following components:       Result  Value    Color, UA Light-Yellow (*)     Ketones, UA Trace (*)     Mucous, UA Trace (*)     All other components within normal limits   COMPREHENSIVE METABOLIC PANEL - Abnormal; Notable for the following components:    Chloride 111 (*)     All other components within normal limits   CBC WITH DIFFERENTIAL - Abnormal; Notable for the following components:    RBC 3.99 (*)     Hgb 7.5 (*)     Hct 27.5 (*)     MCV 68.9 (*)     MCH 18.8 (*)     MCHC 27.3 (*)     RDW 20.4 (*)     Platelet 537 (*)     Eos # 1.90 (*)     All other components within normal limits   CBC W/ AUTO DIFFERENTIAL    Narrative:     The following orders were created for panel order CBC Auto Differential.  Procedure                               Abnormality         Status                     ---------                               -----------         ------                     CBC with Differential[[id number]]        Abnormal            Final result                 Please view results for these tests on the individual orders.   EXTRA TUBES    Narrative:     The following orders were created for panel order EXTRA TUBES.  Procedure                               Abnormality         Status                     ---------                               -----------         ------                     Light Blue Top Hold[[id number]]                              In process                 Red Top Hold[[id number]]                                     In process                   Please view results for these tests on the individual orders.   LIGHT BLUE TOP HOLD   RED TOP HOLD          Imaging Results    None          Medications - No data to display  Medical Decision Making:   Clinical Tests:   Lab Tests: Ordered and Reviewed  ED Management:  The patient is resting comfortably and in no acute distress.   I personally discussed his test results and treatment plan.  Gave strict ED precautions and specific conditions for return to the emergency department and importance of  follow up with pcp.  Patient voices understanding and agrees to the plan discussed. All patients' questions have been answered at this time. He has remained hemodynamically stable throughout entire stay in ED and is stable for discharge home.                         Clinical Impression:   Final diagnoses:  [M54.32] Left sciatic nerve pain (Primary)        ED Disposition Condition    Discharge Stable          ED Prescriptions       Medication Sig Dispense Start Date End Date Auth. Provider    predniSONE (DELTASONE) 20 MG tablet Take 1 tablet (20 mg total) by mouth once daily. for 5 days 5 tablet 10/2/2022 10/7/2022 TIERNEY Figueroa    HYDROcodone-acetaminophen (NORCO) 5-325 mg per tablet Take 1 tablet by mouth every 6 (six) hours as needed for Pain. 12 tablet 10/2/2022 -- TIERNEY Figueroa          Follow-up Information       Follow up With Specialties Details Why Contact Info [facility] Emergency Medicine  As needed, If symptoms worsen [address] [facility] [id number] [phone]    Eleonora Philip MD Gastroenterology   [address] [facility] [address]  [phone]               TIERNEY Figueroa  10/02/22 8794

## 2022-11-03 ENCOUNTER — INFUSION (OUTPATIENT)
Dept: INFUSION THERAPY | Facility: HOSPITAL | Age: 22
End: 2022-11-03
Attending: INTERNAL MEDICINE
Payer: MEDICAID

## 2022-11-03 VITALS
BODY MASS INDEX: 26.88 KG/M2 | OXYGEN SATURATION: 99 % | HEIGHT: 73 IN | RESPIRATION RATE: 20 BRPM | TEMPERATURE: 98 F | WEIGHT: 202.81 LBS | DIASTOLIC BLOOD PRESSURE: 51 MMHG | HEART RATE: 75 BPM | SYSTOLIC BLOOD PRESSURE: 131 MMHG

## 2022-11-03 DIAGNOSIS — K51.919 ULCERATIVE COLITIS WITH COMPLICATION, UNSPECIFIED LOCATION: Primary | ICD-10-CM

## 2022-11-03 PROCEDURE — 25000003 PHARM REV CODE 250: Performed by: INTERNAL MEDICINE

## 2022-11-03 PROCEDURE — 96365 THER/PROPH/DIAG IV INF INIT: CPT

## 2022-11-03 PROCEDURE — 63600175 PHARM REV CODE 636 W HCPCS: Mod: JG | Performed by: INTERNAL MEDICINE

## 2022-11-03 RX ORDER — IPRATROPIUM BROMIDE AND ALBUTEROL SULFATE 2.5; .5 MG/3ML; MG/3ML
3 SOLUTION RESPIRATORY (INHALATION)
Status: CANCELLED | OUTPATIENT
Start: 2022-12-01

## 2022-11-03 RX ORDER — HEPARIN 100 UNIT/ML
500 SYRINGE INTRAVENOUS
Status: DISCONTINUED | OUTPATIENT
Start: 2022-11-03 | End: 2022-11-03 | Stop reason: HOSPADM

## 2022-11-03 RX ORDER — DIPHENHYDRAMINE HYDROCHLORIDE 50 MG/ML
25 INJECTION INTRAMUSCULAR; INTRAVENOUS
Status: CANCELLED | OUTPATIENT
Start: 2022-12-01

## 2022-11-03 RX ORDER — ACETAMINOPHEN 325 MG/1
650 TABLET ORAL
Status: CANCELLED | OUTPATIENT
Start: 2022-12-01

## 2022-11-03 RX ORDER — SODIUM CHLORIDE 0.9 % (FLUSH) 0.9 %
10 SYRINGE (ML) INJECTION
Status: DISCONTINUED | OUTPATIENT
Start: 2022-11-03 | End: 2022-11-03 | Stop reason: HOSPADM

## 2022-11-03 RX ORDER — SODIUM CHLORIDE 0.9 % (FLUSH) 0.9 %
10 SYRINGE (ML) INJECTION
Status: CANCELLED | OUTPATIENT
Start: 2022-12-01

## 2022-11-03 RX ORDER — METHYLPREDNISOLONE SOD SUCC 125 MG
40 VIAL (EA) INJECTION
Status: CANCELLED | OUTPATIENT
Start: 2022-12-01

## 2022-11-03 RX ORDER — HEPARIN 100 UNIT/ML
500 SYRINGE INTRAVENOUS
Status: CANCELLED | OUTPATIENT
Start: 2022-12-01

## 2022-11-03 RX ORDER — EPINEPHRINE 1 MG/ML
0.3 INJECTION, SOLUTION, CONCENTRATE INTRAVENOUS
Status: CANCELLED | OUTPATIENT
Start: 2022-12-01

## 2022-11-03 RX ADMIN — VEDOLIZUMAB 300 MG: 300 INJECTION, POWDER, LYOPHILIZED, FOR SOLUTION INTRAVENOUS at 10:11

## 2022-11-15 ENCOUNTER — OFFICE VISIT (OUTPATIENT)
Dept: GASTROENTEROLOGY | Facility: CLINIC | Age: 22
End: 2022-11-15
Payer: MEDICAID

## 2022-11-15 VITALS
SYSTOLIC BLOOD PRESSURE: 131 MMHG | HEIGHT: 73 IN | DIASTOLIC BLOOD PRESSURE: 79 MMHG | HEART RATE: 92 BPM | RESPIRATION RATE: 18 BRPM | WEIGHT: 202.38 LBS | TEMPERATURE: 99 F | BODY MASS INDEX: 26.82 KG/M2

## 2022-11-15 DIAGNOSIS — D50.0 IRON DEFICIENCY ANEMIA DUE TO CHRONIC BLOOD LOSS: ICD-10-CM

## 2022-11-15 DIAGNOSIS — K51.019 ULCERATIVE PANCOLITIS WITH COMPLICATION: ICD-10-CM

## 2022-11-15 PROCEDURE — 99215 OFFICE O/P EST HI 40 MIN: CPT | Mod: PBBFAC | Performed by: INTERNAL MEDICINE

## 2022-11-15 RX ORDER — FERROUS GLUCONATE 324(38)MG
324 TABLET ORAL
COMMUNITY
End: 2022-12-13 | Stop reason: ALTCHOICE

## 2022-11-15 RX ORDER — DICLOFENAC SODIUM 75 MG/1
75 TABLET, DELAYED RELEASE ORAL 2 TIMES DAILY
COMMUNITY
Start: 2022-11-03 | End: 2021-12-15

## 2022-11-15 RX ORDER — CYANOCOBALAMIN (VITAMIN B-12) 1000MCG/15
LIQUID (ML) ORAL
COMMUNITY
End: 2022-12-13 | Stop reason: ALTCHOICE

## 2022-11-15 RX ORDER — BACLOFEN 10 MG/1
10 TABLET ORAL 3 TIMES DAILY
COMMUNITY
Start: 2022-11-03 | End: 2022-12-13 | Stop reason: ALTCHOICE

## 2022-11-15 RX ORDER — BUDESONIDE 2 MG/1
2 AEROSOL, FOAM RECTAL 2 TIMES DAILY
Qty: 33.4 G | Refills: 5 | Status: SHIPPED | OUTPATIENT
Start: 2022-11-15 | End: 2023-10-05

## 2022-11-15 NOTE — PROGRESS NOTES
Inflammatory Bowel Disease        Established Patient Note         TODAY'S VISIT DATE:  11/15/2022  The patient's last visit with me was on 7/26/2022.     PCP: Madhu Goodson      Referring MD:   No ref. provider found    History of Present Illness:    Mr. Gutierrez is a 22 year old male with ulcerative colitis on Entyvio 300mg every 4 weeks, tacrolimus suppositories diagnosed in November 2020 here for follow-up.    His symptoms started in 2019 with rectal bleeding, diarrhea with associated urgency, tenesmus, and lower abdominal cramping. His weight was stable.     Colonoscopy in November 2020 showed Torrez endo Score 2 inflammation (moderate, with marked erythema, absent vascular pattern, friability, erosions) from the rectum to the hepatic flexure. Normal TI, cecum, ascending colon. Biopsies showed moderate to marked chronic active colitis with cryptitis, crypt abscesses, epithelial erosion, increased chronic inflammation and architectural distortion. Right colon biopsies showed mild inflammation. He was started on Apriso 1.5 gm daily and canasa.    Received Feraheme x 2 in December 2020.     On follow-up in February 2021 he reported continued symptoms despite apriso 1.5 gm and uceris 9mg daily.   I stopped apriso and started vedolizumab monotherapy.    He had his induction doses on 03/09/21, 03/24/21, and 04/21/21. His week 14 trough was 12 in June 2021. He was changed to every 4 weeks due to ongoing symptoms and trough was 13 in August 2021 on q 4 weeks.    Initially he noticed less blood and frequency after his first 2 infusions but since then had continued symptoms of bleeding, urgency, diarrhea Infectious stool studies were negative. Fecal calprotectin was 2464 was in August 2021. He was given Uceris then prednisone taper.     Fecal calprotectin was 274 in October 2021. 831 in June 2022.  CRP 5.6  6/22.    December 13, 2021: Flex Sig: Moderately active (Torrez score 2) ulcerative  colitis in rectum and distal sigmoid colon.  Inactive (Torrez Score 0) ulcerative colitis in proximal sigmoid colon and distal descending colon.  He was started on topical rectal therapy - canasa initially then transitioned to tacrolimus suppositories when canasa was not effective.      He was initiated on a Prednisone taper in June 2022 after reported worsening UC related symptoms.  Labs drawn at that time significant for worsening anemia and iron studies with H&H 8.4/28.7 on 6/17/22 (from from 9.4/31.2 on 11/29/21); iron level 7, iron saturation 2.  June 2022: Fecal calprotectin 831.  Patient was essentially non-compliant with his tacrolimus suppositories despite known active disease of rectum and distal colon.  We discussed the need for compliance with topical therapy with consideration of endoscopy to determine if evidence of more active disease proximally.     Today he reports he is not using his suppositories due to some burning with Bms following use.  He is having 7-8 loose stools a day.  He sees bright red blood intermittently in his stool, not with every stool but with most.  He has tenesmus, urgency.  He is having some nocturnal stools at times.      He is taking diclofenac for his back pain along with baclofen.      Hgb 7.5 g/dL in October (checked by ED for back pain).  Alb 3.6.  He is not on oral iron.     He denies regular NSAID use. He smokes cigars 2 times a week. No alcohol use. He denies a family history of IBD, colon polyps, or colon cancer.    IBD History:  IBD disease: UC  Disease location: pancolitis      Current IBD Therapy:  Entyvio 300mg every 4 weeks (March 2021 - present)  Tacrolimus suppository     Therapeutic Drug Monitoring Labs:  August 2021: Vedo trough 13, Ab < 25 (every 4 weeks)    Prior IBD Therapies:  Apriso 1.5 gm daily  Canasa 1 gm nightly  uceris 9mg daily  Prednisone      Pertinent Endoscopy/Imaging:  November 11, 2020: Colonoscopy: Torrez Score 2 inflammation (moderate, with  marked erythema, absent vascular pattern, friability, erosions) from the rectum to the hepatic flexure. Normal TI, cecum, ascending colon. Biopsies showed moderate to marked chronic active colitis with cryptitis, crypt abscesses, epithelial erosion, increased chronic inflammation and architectural distortion. Right colon biopsies showed mild inflammation.     December 13, 2021: Flex Sig: Moderately active (Torrez score 2) ulcerative colitis in rectum and distal sigmoid colon, unchanged since last examination.  Inactive (Torrez Score 0) ulcerative colitis in proximal sigmoid colon and distal descending colon, improved since last exam      Prior Pertinent Surgeries:   none    Complications:   none    Extraintestinal Manifestations:  None    Review of Systems   Constitutional:  Positive for fatigue and unexpected weight change. Negative for appetite change.   HENT:  Negative for trouble swallowing.    Respiratory:  Positive for shortness of breath.    Cardiovascular: Negative.    Gastrointestinal:  Positive for abdominal pain, blood in stool, diarrhea and nausea. Negative for change in bowel habit, constipation and change in bowel habit.   Musculoskeletal:  Positive for back pain.   Neurological: Negative.    Hematological: Negative.    Psychiatric/Behavioral: Negative.     All other systems reviewed and are negative.       Medical/Surgical History:   Past Medical History:   Diagnosis Date    Acid reflux     ADD (attention deficit disorder)     Anxiety     Ulcerative colitis      Past Surgical History:   Procedure Laterality Date    COLONOSCOPY      SURGICAL REMOVAL OF PILONIDAL CYST           Family History:   Family History   Problem Relation Age of Onset    Diverticulitis Mother     Lupus Maternal Aunt         Social History:   Social History     Socioeconomic History    Marital status: Unknown   Tobacco Use    Smoking status: Every Day     Packs/day: 0.50     Types: Cigars, Cigarettes    Smokeless tobacco: Never  "  Substance and Sexual Activity    Alcohol use: Yes     Comment: socially- rare    Drug use: Yes     Types: Marijuana        Review of patient's allergies indicates:  No Known Allergies    Current Medications:   Outpatient Medications Marked as Taking for the 11/15/22 encounter (Office Visit) with Eleonora Philip MD   Medication Sig Dispense Refill    baclofen (LIORESAL) 10 MG tablet Take 10 mg by mouth 3 (three) times daily.      cyanocobalamin, vitamin B-12, 1,000 mcg/15 mL Liqd Take by mouth. gummie      diclofenac (VOLTAREN) 75 MG EC tablet Take 75 mg by mouth 2 (two) times daily.      EScitalopram oxalate (LEXAPRO) 10 MG tablet       ferrous gluconate (FERGON) 324 MG tablet Take 324 mg by mouth daily with breakfast.      HYDROcodone-acetaminophen (NORCO) 5-325 mg per tablet Take 1 tablet by mouth every 6 (six) hours as needed for Pain. 12 tablet 0    multivitamin capsule Take 1 capsule by mouth once daily.      omeprazole (PRILOSEC) 20 MG capsule Take 20 mg by mouth once daily.          Vital Signs:  /79 (BP Location: Left arm, Patient Position: Lying, BP Method: Medium (Automatic))   Pulse 92   Temp 98.7 °F (37.1 °C) (Oral)   Resp 18   Ht 6' 1" (1.854 m)   Wt 91.8 kg (202 lb 6.4 oz)   BMI 26.70 kg/m²      Physical Exam  Vitals reviewed.   Constitutional:       Appearance: Normal appearance.   HENT:      Head: Normocephalic and atraumatic.      Mouth/Throat:      Mouth: Mucous membranes are moist.   Eyes:      Extraocular Movements: Extraocular movements intact.      Conjunctiva/sclera: Conjunctivae normal.   Cardiovascular:      Rate and Rhythm: Normal rate and regular rhythm.   Pulmonary:      Effort: Pulmonary effort is normal.      Breath sounds: Normal breath sounds.   Abdominal:      General: Bowel sounds are normal.      Palpations: Abdomen is soft.      Tenderness: There is no abdominal tenderness.   Musculoskeletal:      Cervical back: Normal range of motion.   Skin:     General: Skin " is warm and dry.      Coloration: Skin is pale.   Neurological:      General: No focal deficit present.      Mental Status: He is alert and oriented to person, place, and time.   Psychiatric:         Mood and Affect: Mood normal.         Behavior: Behavior normal.       Labs: Reviewed  Hgb   Date Value Ref Range Status   10/02/2022 7.5 (L) 14.0 - 18.0 gm/dL Final     Albumin Level   Date Value Ref Range Status   10/02/2022 3.6 3.5 - 5.0 gm/dL Final     Iron Level   Date Value Ref Range Status   06/17/2022 7 (L) 65 - 175 ug/dL Final     Ferritin Level   Date Value Ref Range Status   06/17/2022 4.28 (L) 21.81 - 274.66 ng/mL Final     Folate Level   Date Value Ref Range Status   06/17/2022 11.8 7.0 - 31.4 ng/mL Final     Vitamin B12 Level   Date Value Ref Range Status   06/17/2022 1,496 (H) 213 - 816 pg/mL Final     Vit D 25 OH   Date Value Ref Range Status   06/17/2022 73.6 30.0 - 80.0 ng/mL Final     Zinc   Date Value Ref Range Status   06/17/2022 1.65 (H) 0.66 - 1.10 mcg/mL Final     Comment:        -------------------ADDITIONAL INFORMATION-------------------  This test was developed and its performance characteristics   determined by [facility] in a manner consistent with CLIA   requirements. This test has not been cleared or approved by   the U.S. Food and Drug Administration.     Test Performed by:  [facility]  [address] [place] [address]  : Juan Smart M.D. Ph.D.; CLIA# [id number]     C-Reactive Protein   Date Value Ref Range Status   06/17/2022 5.60 (H) <5.00 mg/L Final     Hepatitis B Surface Antigen   Date Value Ref Range Status   06/29/2022 Nonreactive Nonreactive Final      Quantiferon gold: Negative - June 2022  Fecal calprotectin: 274 (October 2021) --> 831 (June 2022)      Assessment/Plan:    Problem List Items Addressed This Visit          Oncology    Iron deficiency anemia due to chronic blood loss     Hgb 7.5 g/dL, MCV 68.9 in  October 2022  Start ferrous sulfate 325 mg daily  Arrange iron infusions  Recheck CBC, iron panel, ferritin 4-6 weeks following iron infusions.               GI    Ulcerative colitis with complication     Torrez endo 2 inflammation on colonoscopy in November 2020  Started Entyvio in March 2021. Received induction and week 14 trough was 12   Changed to every 4 weeks due to ongoing symptoms  Trough 13 in August 2021 on q 4 weeks  Fecal calprotectin 2464-->274 in October 2021 December 2021 Flex sig: Torrez 2 disease in rectosigmoid region, Torrez 0 in sigmoid and descending colon  Start topical therapy with Canasa following flex sig.    Tacrolimus suppositories then given with ongoing symptoms  Not compliant with tacrolimus suppositories  Given steroid taper in June 2022 for continued symptoms.  Fecal calprotectin 831  Continue Entyvio 300mg every 4 weeks for now.  Stop tacrolimus suppositories.  Start Uceris foam BID and encouraged compliance  Low threshold to re-scope to evaluate for more proximal disease.  Plan close follow-up with repeat flex sig if compliant with therapy to determine if change of therapy indicated         Relevant Medications    budesonide (UCERIS) 2 mg/actuation Foam               HEALTH MAINTENANCE:      Vaccinations:     Influenza (inactive):  recommended annually   PCV 13 (Prevnar): September 22, 2021  PPSV 23 (Pneumovax): 2-12 mos after PCV 13, repeat 5 years later and then after age 66 yo  Tetanus (TdaP): June 2013, recommended every 10 years  HPV (males and females ages 11-44 yo): received vaccination, UTD  Meningococcal: vaccinated May 2021  Hepatitis B: not immune  Hepatitis A:  not immune  MMR (live vaccine): UTD      Chickenpox status/Varicella (live vaccine):  received vaccination, UTD  Shingrix: recommend   COVID-19: recommend     Colorectal cancer risk:  Risk factors: > 8 years of disease, > 1/3 colon involved  - Distribution of colonic disease: > 1/3 of colon  - Year of symptom onset:  2020  - Colonoscopy for surveillance after endoscopic remission    Ophthalmologic exam recommended yearly  Dermatologic exam recommended yearly due to risk of skin cancer with IBD/meds    Bone health:  Calcium 3454-7561 mg daily and vitamin D 1000 IU daily  Risk factors for osteopenia/osteoporosis: Uceris, UC, prednisone  Vitamin D: 73.6     DEXA scan: recommend baseline        Smoking status: Smokes cigars several times/week, marijuana use daily       Follow up: Colonoscopy in January 2023

## 2022-11-15 NOTE — ASSESSMENT & PLAN NOTE
Torrez endo 2 inflammation on colonoscopy in November 2020  Started Entyvio in March 2021. Received induction and week 14 trough was 12   Changed to every 4 weeks due to ongoing symptoms  Trough 13 in August 2021 on q 4 weeks  Fecal calprotectin 2464-->274 in October 2021 December 2021 Flex sig: Torrez 2 disease in rectosigmoid region, Torrez 0 in sigmoid and descending colon  Start topical therapy with Canasa following flex sig.    Tacrolimus suppositories then given with ongoing symptoms  Not compliant with tacrolimus suppositories  Given steroid taper in June 2022 for continued symptoms.  Fecal calprotectin 831  Continue Entyvio 300mg every 4 weeks for now.  Stop tacrolimus suppositories.  Start Uceris foam BID and encouraged compliance  Low threshold to re-scope to evaluate for more proximal disease.  Plan close follow-up with repeat flex sig if compliant with therapy to determine if change of therapy indicated

## 2022-11-15 NOTE — ASSESSMENT & PLAN NOTE
Hgb 7.5 g/dL, MCV 68.9 in October 2022  Start ferrous sulfate 325 mg daily  Arrange iron infusions  Recheck CBC, iron panel, ferritin 4-6 weeks following iron infusions.

## 2022-11-17 DIAGNOSIS — D50.0 IRON DEFICIENCY ANEMIA DUE TO CHRONIC BLOOD LOSS: Primary | ICD-10-CM

## 2022-11-17 RX ORDER — HEPARIN 100 UNIT/ML
500 SYRINGE INTRAVENOUS
Status: CANCELLED | OUTPATIENT
Start: 2022-11-17

## 2022-11-17 RX ORDER — METHYLPREDNISOLONE SOD SUCC 125 MG
125 VIAL (EA) INJECTION ONCE AS NEEDED
Status: CANCELLED | OUTPATIENT
Start: 2022-11-17

## 2022-11-17 RX ORDER — SODIUM CHLORIDE 0.9 % (FLUSH) 0.9 %
10 SYRINGE (ML) INJECTION
Status: CANCELLED | OUTPATIENT
Start: 2022-11-17

## 2022-11-17 RX ORDER — DIPHENHYDRAMINE HYDROCHLORIDE 50 MG/ML
50 INJECTION INTRAMUSCULAR; INTRAVENOUS ONCE AS NEEDED
Status: CANCELLED | OUTPATIENT
Start: 2022-11-17

## 2022-11-17 RX ORDER — EPINEPHRINE 0.3 MG/.3ML
0.3 INJECTION SUBCUTANEOUS ONCE AS NEEDED
Status: CANCELLED | OUTPATIENT
Start: 2022-11-17

## 2022-12-01 ENCOUNTER — INFUSION (OUTPATIENT)
Dept: INFUSION THERAPY | Facility: HOSPITAL | Age: 22
End: 2022-12-01
Attending: INTERNAL MEDICINE
Payer: MEDICAID

## 2022-12-01 VITALS
HEART RATE: 90 BPM | OXYGEN SATURATION: 100 % | WEIGHT: 191.13 LBS | TEMPERATURE: 98 F | HEIGHT: 73 IN | SYSTOLIC BLOOD PRESSURE: 128 MMHG | BODY MASS INDEX: 25.33 KG/M2 | DIASTOLIC BLOOD PRESSURE: 78 MMHG | RESPIRATION RATE: 18 BRPM

## 2022-12-01 DIAGNOSIS — K51.919 ULCERATIVE COLITIS WITH COMPLICATION, UNSPECIFIED LOCATION: Primary | ICD-10-CM

## 2022-12-01 PROCEDURE — 96365 THER/PROPH/DIAG IV INF INIT: CPT

## 2022-12-01 PROCEDURE — 63600175 PHARM REV CODE 636 W HCPCS: Mod: JG | Performed by: INTERNAL MEDICINE

## 2022-12-01 PROCEDURE — 25000003 PHARM REV CODE 250: Performed by: INTERNAL MEDICINE

## 2022-12-01 RX ORDER — HEPARIN 100 UNIT/ML
500 SYRINGE INTRAVENOUS
Status: CANCELLED | OUTPATIENT
Start: 2022-12-29

## 2022-12-01 RX ORDER — SODIUM CHLORIDE 0.9 % (FLUSH) 0.9 %
10 SYRINGE (ML) INJECTION
Status: CANCELLED | OUTPATIENT
Start: 2022-12-29

## 2022-12-01 RX ORDER — ACETAMINOPHEN 325 MG/1
650 TABLET ORAL
Status: CANCELLED | OUTPATIENT
Start: 2022-12-29

## 2022-12-01 RX ORDER — HEPARIN 100 UNIT/ML
500 SYRINGE INTRAVENOUS
Status: DISCONTINUED | OUTPATIENT
Start: 2022-12-01 | End: 2022-12-01 | Stop reason: HOSPADM

## 2022-12-01 RX ORDER — SODIUM CHLORIDE 0.9 % (FLUSH) 0.9 %
10 SYRINGE (ML) INJECTION
Status: DISCONTINUED | OUTPATIENT
Start: 2022-12-01 | End: 2022-12-01 | Stop reason: HOSPADM

## 2022-12-01 RX ORDER — METHYLPREDNISOLONE SOD SUCC 125 MG
40 VIAL (EA) INJECTION
Status: CANCELLED | OUTPATIENT
Start: 2022-12-29

## 2022-12-01 RX ORDER — DIPHENHYDRAMINE HYDROCHLORIDE 50 MG/ML
25 INJECTION INTRAMUSCULAR; INTRAVENOUS
Status: CANCELLED | OUTPATIENT
Start: 2022-12-29

## 2022-12-01 RX ORDER — IPRATROPIUM BROMIDE AND ALBUTEROL SULFATE 2.5; .5 MG/3ML; MG/3ML
3 SOLUTION RESPIRATORY (INHALATION)
Status: CANCELLED | OUTPATIENT
Start: 2022-12-29

## 2022-12-01 RX ORDER — EPINEPHRINE 1 MG/ML
0.3 INJECTION, SOLUTION, CONCENTRATE INTRAVENOUS
Status: CANCELLED | OUTPATIENT
Start: 2022-12-29

## 2022-12-01 RX ADMIN — VEDOLIZUMAB 300 MG: 300 INJECTION, POWDER, LYOPHILIZED, FOR SOLUTION INTRAVENOUS at 02:12

## 2022-12-12 ENCOUNTER — PATIENT MESSAGE (OUTPATIENT)
Dept: ENDOSCOPY | Facility: HOSPITAL | Age: 22
End: 2022-12-12
Payer: MEDICAID

## 2022-12-12 ENCOUNTER — TELEPHONE (OUTPATIENT)
Dept: GASTROENTEROLOGY | Facility: CLINIC | Age: 22
End: 2022-12-12
Payer: MEDICAID

## 2022-12-12 NOTE — TELEPHONE ENCOUNTER
"Call from pt mother. States, Saji is very pale & weak. We had his brother's wedding this weekend and he couldn't even stand for the ceremony. His first Iron infusion is not until Dec 22. He can't stomach the oral Iron." Advised her that I would speak with Griselda and get back to her.  Griselda advised pt go to the ER. Advised mom. Verbalized understanding.   "

## 2022-12-13 ENCOUNTER — HOSPITAL ENCOUNTER (OUTPATIENT)
Facility: HOSPITAL | Age: 22
Discharge: HOME | DRG: 387 | End: 2022-12-16
Attending: STUDENT IN AN ORGANIZED HEALTH CARE EDUCATION/TRAINING PROGRAM | Admitting: INTERNAL MEDICINE
Payer: MEDICAID

## 2022-12-13 ENCOUNTER — PATIENT MESSAGE (OUTPATIENT)
Dept: ENDOSCOPY | Facility: HOSPITAL | Age: 22
End: 2022-12-13
Payer: MEDICAID

## 2022-12-13 DIAGNOSIS — D64.9 SYMPTOMATIC ANEMIA: Primary | ICD-10-CM

## 2022-12-13 DIAGNOSIS — D50.0 IRON DEFICIENCY ANEMIA DUE TO CHRONIC BLOOD LOSS: ICD-10-CM

## 2022-12-13 LAB
ALBUMIN SERPL-MCNC: 2.9 GM/DL (ref 3.5–5)
ALBUMIN/GLOB SERPL: 0.9 RATIO (ref 1.1–2)
ALP SERPL-CCNC: 85 UNIT/L (ref 40–150)
ALT SERPL-CCNC: 9 UNIT/L (ref 0–55)
APPEARANCE UR: ABNORMAL
AST SERPL-CCNC: 11 UNIT/L (ref 5–34)
BACTERIA #/AREA URNS AUTO: ABNORMAL /HPF
BASOPHILS # BLD AUTO: 0.06 X10(3)/MCL (ref 0–0.2)
BASOPHILS NFR BLD AUTO: 0.6 %
BILIRUB UR QL STRIP.AUTO: NEGATIVE MG/DL
BILIRUBIN DIRECT+TOT PNL SERPL-MCNC: 0.3 MG/DL
BUN SERPL-MCNC: 6.2 MG/DL (ref 8.9–20.6)
CALCIUM SERPL-MCNC: 8.9 MG/DL (ref 8.4–10.2)
CHLORIDE SERPL-SCNC: 107 MMOL/L (ref 98–107)
CO2 SERPL-SCNC: 23 MMOL/L (ref 22–29)
COLOR UR AUTO: ABNORMAL
CREAT SERPL-MCNC: 0.8 MG/DL (ref 0.73–1.18)
CRP SERPL-MCNC: 6.9 MG/L
EOSINOPHIL # BLD AUTO: 0.93 X10(3)/MCL (ref 0–0.9)
EOSINOPHIL NFR BLD AUTO: 8.7 %
ERYTHROCYTE [DISTWIDTH] IN BLOOD BY AUTOMATED COUNT: 20.7 % (ref 11.5–17)
ERYTHROCYTE [SEDIMENTATION RATE] IN BLOOD: 41 MM/HR (ref 0–15)
FERRITIN SERPL-MCNC: 8.52 NG/ML (ref 21.81–274.66)
GFR SERPLBLD CREATININE-BSD FMLA CKD-EPI: >60 MLS/MIN/1.73/M2
GLOBULIN SER-MCNC: 3.4 GM/DL (ref 2.4–3.5)
GLUCOSE SERPL-MCNC: 87 MG/DL (ref 74–100)
GLUCOSE UR QL STRIP.AUTO: NORMAL MG/DL
GROUP & RH: NORMAL
GROUP & RH: NORMAL
HCT VFR BLD AUTO: 22 % (ref 42–52)
HGB BLD-MCNC: 6.1 GM/DL (ref 14–18)
HYALINE CASTS #/AREA URNS LPF: ABNORMAL /LPF
IMM GRANULOCYTES # BLD AUTO: 0.06 X10(3)/MCL (ref 0–0.04)
IMM GRANULOCYTES NFR BLD AUTO: 0.6 %
INDIRECT COOMBS GEL: NORMAL
IRON SATN MFR SERPL: 4 % (ref 20–50)
IRON SERPL-MCNC: 11 UG/DL (ref 65–175)
KETONES UR QL STRIP.AUTO: NEGATIVE MG/DL
LEUKOCYTE ESTERASE UR QL STRIP.AUTO: NEGATIVE UNIT/L
LYMPHOCYTES # BLD AUTO: 2.74 X10(3)/MCL (ref 0.6–4.6)
LYMPHOCYTES NFR BLD AUTO: 25.6 %
MCH RBC QN AUTO: 18.4 PG (ref 27–31)
MCHC RBC AUTO-ENTMCNC: 27.7 MG/DL (ref 33–36)
MCV RBC AUTO: 66.5 FL (ref 80–94)
MONOCYTES # BLD AUTO: 0.9 X10(3)/MCL (ref 0.1–1.3)
MONOCYTES NFR BLD AUTO: 8.4 %
MUCOUS THREADS URNS QL MICRO: ABNORMAL /LPF
NEUTROPHILS # BLD AUTO: 6 X10(3)/MCL (ref 2.1–9.2)
NEUTROPHILS NFR BLD AUTO: 56.1 %
NITRITE UR QL STRIP.AUTO: NEGATIVE
NRBC BLD AUTO-RTO: 0.2 %
PH UR STRIP.AUTO: 6 [PH]
PLATELET # BLD AUTO: 608 X10(3)/MCL (ref 130–400)
PMV BLD AUTO: 9.5 FL (ref 7.4–10.4)
POTASSIUM SERPL-SCNC: 4.2 MMOL/L (ref 3.5–5.1)
PROT SERPL-MCNC: 6.3 GM/DL (ref 6.4–8.3)
PROT UR QL STRIP.AUTO: ABNORMAL MG/DL
RBC # BLD AUTO: 3.31 X10(6)/MCL (ref 4.7–6.1)
RBC #/AREA URNS AUTO: ABNORMAL /HPF
RBC UR QL AUTO: NEGATIVE UNIT/L
SODIUM SERPL-SCNC: 137 MMOL/L (ref 136–145)
SP GR UR STRIP.AUTO: 1.01
SQUAMOUS #/AREA URNS LPF: ABNORMAL /HPF
TIBC SERPL-MCNC: 236 UG/DL (ref 69–240)
TIBC SERPL-MCNC: 247 UG/DL (ref 250–450)
TRANSFERRIN SERPL-MCNC: 226 MG/DL (ref 174–364)
TROPONIN I SERPL-MCNC: <0.01 NG/ML (ref 0–0.04)
UROBILINOGEN UR STRIP-ACNC: NORMAL MG/DL
WBC # SPEC AUTO: 10.7 X10(3)/MCL (ref 4.5–11.5)
WBC #/AREA URNS AUTO: ABNORMAL /HPF

## 2022-12-13 PROCEDURE — 84484 ASSAY OF TROPONIN QUANT: CPT | Performed by: PHYSICIAN ASSISTANT

## 2022-12-13 PROCEDURE — 86920 COMPATIBILITY TEST SPIN: CPT | Performed by: PHYSICIAN ASSISTANT

## 2022-12-13 PROCEDURE — 36430 TRANSFUSION BLD/BLD COMPNT: CPT

## 2022-12-13 PROCEDURE — 82728 ASSAY OF FERRITIN: CPT

## 2022-12-13 PROCEDURE — G0378 HOSPITAL OBSERVATION PER HR: HCPCS

## 2022-12-13 PROCEDURE — 96374 THER/PROPH/DIAG INJ IV PUSH: CPT

## 2022-12-13 PROCEDURE — 86140 C-REACTIVE PROTEIN: CPT | Performed by: STUDENT IN AN ORGANIZED HEALTH CARE EDUCATION/TRAINING PROGRAM

## 2022-12-13 PROCEDURE — 25000003 PHARM REV CODE 250

## 2022-12-13 PROCEDURE — 89055 LEUKOCYTE ASSESSMENT FECAL: CPT | Performed by: STUDENT IN AN ORGANIZED HEALTH CARE EDUCATION/TRAINING PROGRAM

## 2022-12-13 PROCEDURE — 83540 ASSAY OF IRON: CPT

## 2022-12-13 PROCEDURE — 86901 BLOOD TYPING SEROLOGIC RH(D): CPT | Mod: 91 | Performed by: STUDENT IN AN ORGANIZED HEALTH CARE EDUCATION/TRAINING PROGRAM

## 2022-12-13 PROCEDURE — 85651 RBC SED RATE NONAUTOMATED: CPT | Performed by: STUDENT IN AN ORGANIZED HEALTH CARE EDUCATION/TRAINING PROGRAM

## 2022-12-13 PROCEDURE — 93005 ELECTROCARDIOGRAM TRACING: CPT

## 2022-12-13 PROCEDURE — 80053 COMPREHEN METABOLIC PANEL: CPT | Performed by: PHYSICIAN ASSISTANT

## 2022-12-13 PROCEDURE — 99291 CRITICAL CARE FIRST HOUR: CPT

## 2022-12-13 PROCEDURE — P9016 RBC LEUKOCYTES REDUCED: HCPCS | Performed by: PHYSICIAN ASSISTANT

## 2022-12-13 PROCEDURE — 86920 COMPATIBILITY TEST SPIN: CPT

## 2022-12-13 PROCEDURE — 63600175 PHARM REV CODE 636 W HCPCS

## 2022-12-13 PROCEDURE — 81001 URINALYSIS AUTO W/SCOPE: CPT | Performed by: INTERNAL MEDICINE

## 2022-12-13 PROCEDURE — C9113 INJ PANTOPRAZOLE SODIUM, VIA: HCPCS

## 2022-12-13 PROCEDURE — 21400001 HC TELEMETRY ROOM

## 2022-12-13 PROCEDURE — 96361 HYDRATE IV INFUSION ADD-ON: CPT

## 2022-12-13 PROCEDURE — 85025 COMPLETE CBC W/AUTO DIFF WBC: CPT | Performed by: PHYSICIAN ASSISTANT

## 2022-12-13 PROCEDURE — 86850 RBC ANTIBODY SCREEN: CPT | Performed by: PHYSICIAN ASSISTANT

## 2022-12-13 RX ORDER — NALOXONE HCL 0.4 MG/ML
0.02 VIAL (ML) INJECTION
Status: DISCONTINUED | OUTPATIENT
Start: 2022-12-13 | End: 2022-12-16 | Stop reason: HOSPADM

## 2022-12-13 RX ORDER — GLUCAGON 1 MG
1 KIT INJECTION
Status: DISCONTINUED | OUTPATIENT
Start: 2022-12-13 | End: 2022-12-16 | Stop reason: HOSPADM

## 2022-12-13 RX ORDER — SODIUM CHLORIDE 0.9 % (FLUSH) 0.9 %
10 SYRINGE (ML) INJECTION EVERY 12 HOURS PRN
Status: DISCONTINUED | OUTPATIENT
Start: 2022-12-13 | End: 2022-12-16 | Stop reason: HOSPADM

## 2022-12-13 RX ORDER — HYDROCODONE BITARTRATE AND ACETAMINOPHEN 500; 5 MG/1; MG/1
TABLET ORAL
Status: DISCONTINUED | OUTPATIENT
Start: 2022-12-13 | End: 2022-12-16 | Stop reason: HOSPADM

## 2022-12-13 RX ORDER — ACETAMINOPHEN 325 MG/1
650 TABLET ORAL EVERY 4 HOURS PRN
Status: DISCONTINUED | OUTPATIENT
Start: 2022-12-13 | End: 2022-12-16 | Stop reason: HOSPADM

## 2022-12-13 RX ORDER — SODIUM CHLORIDE 9 MG/ML
INJECTION, SOLUTION INTRAVENOUS CONTINUOUS
Status: DISCONTINUED | OUTPATIENT
Start: 2022-12-13 | End: 2022-12-16 | Stop reason: HOSPADM

## 2022-12-13 RX ORDER — IBUPROFEN 400 MG/1
400 TABLET ORAL EVERY 6 HOURS PRN
Status: DISCONTINUED | OUTPATIENT
Start: 2022-12-13 | End: 2022-12-13

## 2022-12-13 RX ORDER — IBUPROFEN 200 MG
16 TABLET ORAL
Status: DISCONTINUED | OUTPATIENT
Start: 2022-12-13 | End: 2022-12-16 | Stop reason: HOSPADM

## 2022-12-13 RX ORDER — IBUPROFEN 200 MG
24 TABLET ORAL
Status: DISCONTINUED | OUTPATIENT
Start: 2022-12-13 | End: 2022-12-16 | Stop reason: HOSPADM

## 2022-12-13 RX ORDER — PANTOPRAZOLE SODIUM 40 MG/10ML
40 INJECTION, POWDER, LYOPHILIZED, FOR SOLUTION INTRAVENOUS EVERY 12 HOURS
Status: DISCONTINUED | OUTPATIENT
Start: 2022-12-13 | End: 2022-12-14

## 2022-12-13 RX ADMIN — SODIUM CHLORIDE: 9 INJECTION, SOLUTION INTRAVENOUS at 10:12

## 2022-12-13 RX ADMIN — PANTOPRAZOLE SODIUM 40 MG: 40 INJECTION, POWDER, FOR SOLUTION INTRAVENOUS at 10:12

## 2022-12-14 PROBLEM — D64.9 SYMPTOMATIC ANEMIA: Status: ACTIVE | Noted: 2022-12-14

## 2022-12-14 LAB
ABO + RH BLD: NORMAL
ALBUMIN SERPL-MCNC: 2.6 GM/DL (ref 3.5–5)
ALBUMIN/GLOB SERPL: 0.8 RATIO (ref 1.1–2)
ALP SERPL-CCNC: 76 UNIT/L (ref 40–150)
ALT SERPL-CCNC: 6 UNIT/L (ref 0–55)
ANISOCYTOSIS BLD QL SMEAR: ABNORMAL
AST SERPL-CCNC: 10 UNIT/L (ref 5–34)
BASOPHILS # BLD AUTO: 0.05 X10(3)/MCL (ref 0–0.2)
BASOPHILS # BLD AUTO: 0.08 X10(3)/MCL (ref 0–0.2)
BASOPHILS NFR BLD AUTO: 0.5 %
BASOPHILS NFR BLD AUTO: 0.7 %
BILIRUBIN DIRECT+TOT PNL SERPL-MCNC: 0.4 MG/DL
BLD PROD TYP BPU: NORMAL
BLOOD UNIT EXPIRATION DATE: NORMAL
BLOOD UNIT TYPE CODE: 5100
BUN SERPL-MCNC: 6.4 MG/DL (ref 8.9–20.6)
CALCIUM SERPL-MCNC: 8.4 MG/DL (ref 8.4–10.2)
CHLORIDE SERPL-SCNC: 109 MMOL/L (ref 98–107)
CO2 SERPL-SCNC: 21 MMOL/L (ref 22–29)
CREAT SERPL-MCNC: 0.8 MG/DL (ref 0.73–1.18)
CROSSMATCH INTERPRETATION: NORMAL
DISPENSE STATUS: NORMAL
ELLIPTOCYTOSIS (OHS): ABNORMAL
EOSINOPHIL # BLD AUTO: 0.8 X10(3)/MCL (ref 0–0.9)
EOSINOPHIL # BLD AUTO: 0.87 X10(3)/MCL (ref 0–0.9)
EOSINOPHIL NFR BLD AUTO: 7 %
EOSINOPHIL NFR BLD AUTO: 9.3 %
ERYTHROCYTE [DISTWIDTH] IN BLOOD BY AUTOMATED COUNT: 22.3 % (ref 11.6–14.4)
ERYTHROCYTE [DISTWIDTH] IN BLOOD BY AUTOMATED COUNT: 22.5 % (ref 11.5–17)
FECAL LEUKOCYTE (OHS): POSITIVE
GFR SERPLBLD CREATININE-BSD FMLA CKD-EPI: >60 MLS/MIN/1.73/M2
GLOBULIN SER-MCNC: 3.4 GM/DL (ref 2.4–3.5)
GLUCOSE SERPL-MCNC: 92 MG/DL (ref 74–100)
HCT VFR BLD AUTO: 25.2 % (ref 42–52)
HCT VFR BLD AUTO: 25.2 % (ref 42–52)
HGB BLD-MCNC: 7.4 GM/DL (ref 14–18)
HGB BLD-MCNC: 7.5 GM/DL (ref 14–18)
HYPOCHROMIA BLD QL SMEAR: ABNORMAL
IMM GRANULOCYTES # BLD AUTO: 0.05 X10(3)/MCL (ref 0–0.04)
IMM GRANULOCYTES # BLD AUTO: 0.06 X10(3)/MCL (ref 0–0.04)
IMM GRANULOCYTES NFR BLD AUTO: 0.4 %
IMM GRANULOCYTES NFR BLD AUTO: 0.6 %
LEUKO NEG CONT (OHS): NEGATIVE
LEUKO POS CONT (OHS): POSITIVE
LYMPHOCYTES # BLD AUTO: 2.74 X10(3)/MCL (ref 0.6–4.6)
LYMPHOCYTES # BLD AUTO: 2.97 X10(3)/MCL (ref 0.6–4.6)
LYMPHOCYTES NFR BLD AUTO: 23.9 %
LYMPHOCYTES NFR BLD AUTO: 31.8 %
MCH RBC QN AUTO: 20.5 PG
MCH RBC QN AUTO: 20.5 PG (ref 27–31)
MCHC RBC AUTO-ENTMCNC: 29.4 MG/DL (ref 33–36)
MCHC RBC AUTO-ENTMCNC: 29.8 MG/DL (ref 33–36)
MCV RBC AUTO: 69 FL (ref 80–94)
MCV RBC AUTO: 69.8 FL (ref 80–94)
MICROCYTES BLD QL SMEAR: ABNORMAL
MONOCYTES # BLD AUTO: 0.93 X10(3)/MCL (ref 0.1–1.3)
MONOCYTES # BLD AUTO: 1.1 X10(3)/MCL (ref 0.1–1.3)
MONOCYTES NFR BLD AUTO: 9.6 %
MONOCYTES NFR BLD AUTO: 9.9 %
NEUTROPHILS # BLD AUTO: 4.47 X10(3)/MCL (ref 2.1–9.2)
NEUTROPHILS # BLD AUTO: 6.7 X10(3)/MCL (ref 2.1–9.2)
NEUTROPHILS NFR BLD AUTO: 47.9 %
NEUTROPHILS NFR BLD AUTO: 58.4 %
NRBC BLD AUTO-RTO: 0 %
NRBC BLD AUTO-RTO: 0.2 % (ref 0–1)
OVALOCYTES (OLG): SLIGHT
PLATELET # BLD AUTO: 518 X10(3)/MCL (ref 140–371)
PLATELET # BLD AUTO: 552 X10(3)/MCL (ref 130–400)
PLATELET # BLD EST: ABNORMAL 10*3/UL
PMV BLD AUTO: 9.2 FL (ref 9.4–12.4)
PMV BLD AUTO: 9.3 FL (ref 7.4–10.4)
POIKILOCYTOSIS BLD QL SMEAR: ABNORMAL
POLYCHROMASIA BLD QL SMEAR: SLIGHT
POTASSIUM SERPL-SCNC: 3.6 MMOL/L (ref 3.5–5.1)
PROT SERPL-MCNC: 6 GM/DL (ref 6.4–8.3)
RBC # BLD AUTO: 3.61 X10(6)/MCL (ref 4.7–6.1)
RBC # BLD AUTO: 3.65 X10(6)/MCL (ref 4.7–6.1)
SODIUM SERPL-SCNC: 138 MMOL/L (ref 136–145)
UNIT NUMBER: NORMAL
WBC # SPEC AUTO: 11.5 X10(3)/MCL (ref 4.5–11.5)
WBC # SPEC AUTO: 9.4 X10(3)/MCL (ref 4.5–11.5)

## 2022-12-14 PROCEDURE — 36415 COLL VENOUS BLD VENIPUNCTURE: CPT | Performed by: INTERNAL MEDICINE

## 2022-12-14 PROCEDURE — 96365 THER/PROPH/DIAG IV INF INIT: CPT

## 2022-12-14 PROCEDURE — 80053 COMPREHEN METABOLIC PANEL: CPT

## 2022-12-14 PROCEDURE — 63600175 PHARM REV CODE 636 W HCPCS

## 2022-12-14 PROCEDURE — 25000003 PHARM REV CODE 250: Performed by: INTERNAL MEDICINE

## 2022-12-14 PROCEDURE — G0378 HOSPITAL OBSERVATION PER HR: HCPCS

## 2022-12-14 PROCEDURE — 85025 COMPLETE CBC W/AUTO DIFF WBC: CPT

## 2022-12-14 PROCEDURE — 25000003 PHARM REV CODE 250

## 2022-12-14 PROCEDURE — 21400001 HC TELEMETRY ROOM

## 2022-12-14 PROCEDURE — 63600175 PHARM REV CODE 636 W HCPCS: Performed by: INTERNAL MEDICINE

## 2022-12-14 PROCEDURE — C9113 INJ PANTOPRAZOLE SODIUM, VIA: HCPCS

## 2022-12-14 PROCEDURE — P9016 RBC LEUKOCYTES REDUCED: HCPCS | Performed by: PHYSICIAN ASSISTANT

## 2022-12-14 PROCEDURE — 94761 N-INVAS EAR/PLS OXIMETRY MLT: CPT

## 2022-12-14 PROCEDURE — 36415 COLL VENOUS BLD VENIPUNCTURE: CPT

## 2022-12-14 PROCEDURE — 36430 TRANSFUSION BLD/BLD COMPNT: CPT

## 2022-12-14 PROCEDURE — 96376 TX/PRO/DX INJ SAME DRUG ADON: CPT

## 2022-12-14 PROCEDURE — 96361 HYDRATE IV INFUSION ADD-ON: CPT

## 2022-12-14 RX ORDER — HYDROCODONE BITARTRATE AND ACETAMINOPHEN 500; 5 MG/1; MG/1
TABLET ORAL
Status: DISCONTINUED | OUTPATIENT
Start: 2022-12-14 | End: 2022-12-16 | Stop reason: HOSPADM

## 2022-12-14 RX ORDER — ENOXAPARIN SODIUM 100 MG/ML
40 INJECTION SUBCUTANEOUS EVERY 24 HOURS
Status: DISCONTINUED | OUTPATIENT
Start: 2022-12-14 | End: 2022-12-16 | Stop reason: HOSPADM

## 2022-12-14 RX ORDER — PANTOPRAZOLE SODIUM 40 MG/1
40 TABLET, DELAYED RELEASE ORAL DAILY
Status: DISCONTINUED | OUTPATIENT
Start: 2022-12-15 | End: 2022-12-16 | Stop reason: HOSPADM

## 2022-12-14 RX ADMIN — SODIUM CHLORIDE: 9 INJECTION, SOLUTION INTRAVENOUS at 09:12

## 2022-12-14 RX ADMIN — SODIUM CHLORIDE 125 MG: 9 INJECTION, SOLUTION INTRAVENOUS at 12:12

## 2022-12-14 RX ADMIN — PANTOPRAZOLE SODIUM 40 MG: 40 INJECTION, POWDER, FOR SOLUTION INTRAVENOUS at 08:12

## 2022-12-14 RX ADMIN — ENOXAPARIN SODIUM 40 MG: 40 INJECTION SUBCUTANEOUS at 05:12

## 2022-12-14 NOTE — CONSULTS
Gastroenterology/Hepatology Initial Consult Note    Patient Name: Saji Gutierrez  Age: 22 y.o.  : 2000  MRN: [id number]  Admission Date: 2022    Reason for Consult:      Medical Management  Chief Complaint   Patient presents with    Weakness     Pt reports feeling weak and SOB w/ activity. Hx UC, normally has iron infusions. Instructed to come to ED for labs.          Self, Aaareferral    HPI:     Saji Gutierrez is a 22 y.o. male of ulcerative colitis, NASIM, acid reflux, ADHD, and anxiety who presented to [facility] ED on 2022 with  c/o generalized weakness and dyspnea on exertion that started 2 days prior to ED presentation. He was admitted for symptomatic anemia secondary to UC.     ED course: VS: /66, P 103, R 18, T 99.0, SpO2 99%. Labs: WBC 10.7, H & H 6.1/22.0, Plt 608, BUN 6.2, Creatinine 0.8, Total Protein 6.3, Albumin 2.9. CRP 6.9, Iron 11, TIBC 247, Transferrin 226, Iron Sat 4, Ferritin 8.52.     GI service consulted for symptomatic anemia and history of UC. He is a known patient to [facility]. He was last seen by Dr. Philip on 11/15/2022.     He was diagnosed with UC in 2020 and is on Entyvio 300mg every 4 weeks. His symptoms started in  with rectal bleeding, diarrhea with associated urgency, tenesmus, and lower abdominal cramping. His weight was stable.     Colonoscopy in 2020 showed Torrez endo Score 2 inflammation (moderate, with marked erythema, absent vascular pattern, friability, erosions) from the rectum to the hepatic flexure. Normal TI, cecum, ascending colon. Biopsies showed moderate to marked chronic active colitis with cryptitis, crypt abscesses, epithelial erosion, increased chronic inflammation and architectural distortion. Right colon biopsies showed mild inflammation. He was started on Apriso 1.5 gm daily and canasa.    Received Feraheme x 2 in 2020.     On follow-up in 2021 he reported continued symptoms despite apriso 1.5 gm and  uceris 9mg daily. Apriso was stopped and he started vedolizumab monotherapy.    He had his induction doses on 03/09/21, 03/24/21, and 04/21/21. His week 14 trough was 12 in June 2021. He was changed to every 4 weeks due to ongoing symptoms and trough was 13 in August 2021 on q 4 weeks.    Initially he noticed less blood and frequency after his first 2 infusions but since then had continued symptoms of bleeding, urgency, diarrhea Infectious stool studies were negative. Fecal calprotectin was 2464 was in August 2021. He was given Uceris then prednisone taper.     Fecal calprotectin was 274 in October 2021. 831 in June 2022. CRP 5.6  6/22.     He was initiated on a Prednisone taper in June after reported worsening UC related symptoms.  Labs drawn at that time significant for worsening anemia and iron studies with H&H 8.4/28.7 on 6/17/22 (from from 9.4/31.2 on 11/29/21); iron level 7, iron saturation 2.  June 2022: Fecal calprotectin 831.  Patient was essentially non-compliant for the last several months with his tacrolimus suppositories despite known active disease of rectum and distal colon.  He does report compliance over the last several weeks though.      Today, he reports feeling bloated and gassy after meals, dependent on the type of meal eaten. For the past month while eating, he will have a bowel movement during the meal, immediately following the meal or 30-45 minutes after completing the meal. He is having 7-8 loose stools a day.  He sees faint red blood, bright red blood and dark red blood intermittently in his stool. Last night he had a stool with thick, dark red blood mixed in the stool with dark red blood seen on the toilet paper. He has tenesmus, urgency.  He is having some nocturnal stools. He reports occasional nausea. He reports experiencing nausea, vomiting and one episode of hematemesis when he first started taking Uceris. He has reflux and takes omeprazole which helps with symptoms. His appetite  has been decreased for the past 3 months. He reports that he has had weight loss. He weighed 210 lbs in 7/2022 and now weighs 185 lbs.      He was previously not using the Uceris suppositories regularly due to some burning with Bms following use. Since his last office visit in November, he reports using Uceris suppositories twice daily with a decrease in bloody stools.      He reports having black stools one month ago while taking diclofenac for back pain. He stopped the diclofenac and is no longer having black stools.     He is taking IV iron replacement. His last iron infusion was approximately 4 weeks ago. He is scheduled for an appointment on 12/22/2022 for IV iron infusion and on 12/29/2022 for IV iron and Entyvio.      He denies regular NSAID use currently. He smokes 5 cigarettes on weekends. He smokes marijuana twice weekly. Denies alcohol use. He denies a family history of IBD or colon cancer. His mother has a history of colon polyps and diverticulitis.        IBD History:  IBD disease: UC  Disease location: pancolitis        Current IBD Therapy:  Entyvio 300mg every 4 weeks (March 2021, trough 12 June 2021, changed to every 4 weeks, trough 13 August 2021 on q 4 weeks)  Entyvio 300mg q9aveng  Prednisone taper (1 week left)  Tacrolimus suppository      Therapeutic Drug Monitoring Labs:  August 2021: Vedo trough 13, Ab < 25 (every 4 weeks)     Prior IBD Therapies:  Apriso 1.5 gm daily  Canasa 1 gm nightly  uceris 9mg daily  Prednisone        Pertinent Endoscopy/Imaging:  November 11, 2020: Colonoscopy: Torrez Score 2 inflammation (moderate, with marked erythema, absent vascular pattern, friability, erosions) from the rectum to the hepatic flexure. Normal TI, cecum, ascending colon. Biopsies showed moderate to marked chronic active colitis with cryptitis, crypt abscesses, epithelial erosion, increased chronic inflammation and architectural distortion. Right colon biopsies showed mild inflammation.     December  13, 2021: Flex Sig: Moderately active (Torrez score 2) ulcerative colitis in rectum and distal sigmoid colon, unchanged since last examination.  Inactive (Torrez Score 0) ulcerative colitis in proximal sigmoid colon and distal descending colon, improved since last exam        Prior Pertinent Surgeries:   none     Complications:   none     Extraintestinal Manifestations:  None      DAPHNE Boyce    Past Medical History:   Diagnosis Date    Acid reflux     ADD (attention deficit disorder)     Anxiety     Ulcerative colitis         Past Surgical History:   Procedure Laterality Date    COLONOSCOPY      SURGICAL REMOVAL OF PILONIDAL CYST          Family History   Problem Relation Age of Onset    Diverticulitis Mother     Lupus Maternal Aunt        Social History     Tobacco Use    Smoking status: Every Day     Packs/day: 0.50     Types: Cigars, Cigarettes    Smokeless tobacco: Never   Substance Use Topics    Alcohol use: Yes     Comment: socially- rare         Review of patient's allergies indicates:  No Known Allergies     Medications Prior to Admission   Medication Sig Dispense Refill Last Dose    budesonide (UCERIS) 2 mg/actuation Foam Place 2 mg rectally 2 (two) times a day. 33.4 g 5 12/13/2022    EScitalopram oxalate (LEXAPRO) 10 MG tablet    12/13/2022    multivitamin capsule Take 1 capsule by mouth once daily.   12/13/2022    omeprazole (PRILOSEC) 20 MG capsule Take 20 mg by mouth once daily.   12/13/2022    diclofenac (VOLTAREN) 75 MG EC tablet Take 75 mg by mouth 2 (two) times daily.            INPATIENT MEDICATIONS    Scheduled Meds:   enoxaparin  40 mg Subcutaneous Daily    pantoprozole (PROTONIX) IV  40 mg Intravenous Q12H     Continuous Infusions:   sodium chloride 0.9% 125 mL/hr at 12/14/22 0903     PRN Meds:  sodium chloride, acetaminophen, dextrose 10%, dextrose 10%, glucagon (human recombinant), glucose, glucose, naloxone, sodium chloride 0.9%          Review of Systems:       Review of Systems    Constitutional:  Positive for appetite change, fatigue and unexpected weight change. Negative for chills and fever.   HENT:  Negative for trouble swallowing.    Respiratory: Negative.     Cardiovascular: Negative.    Gastrointestinal:  Positive for abdominal pain, blood in stool, diarrhea, nausea and reflux. Negative for abdominal distention, anal bleeding, constipation, rectal pain, vomiting and fecal incontinence.   Genitourinary:  Negative for dysuria and hematuria.   Musculoskeletal: Negative.    Integumentary:  Positive for pallor.   Neurological:  Positive for weakness.        Objective:       VITAL SIGNS: 24 HR MIN & MAX LAST    Temp  Min: 98 °F (36.7 °C)  Max: 99.6 °F (37.6 °C)  98 °F (36.7 °C)        BP  Min: 110/57  Max: 139/78  117/73     Pulse  Min: 77  Max: 103  77     Resp  Min: 16  Max: 20  18    SpO2  Min: 97 %  Max: 100 %  98 %        Physical Exam  Constitutional:       General: He is awake. He is not in acute distress.  Eyes:      General: No scleral icterus.     Conjunctiva/sclera: Conjunctivae normal.   Cardiovascular:      Rate and Rhythm: Normal rate and regular rhythm.      Pulses: Normal pulses.      Heart sounds: Normal heart sounds.   Pulmonary:      Effort: Pulmonary effort is normal. No respiratory distress.      Breath sounds: Normal breath sounds.   Abdominal:      General: Abdomen is flat. Bowel sounds are normal. There is no distension.      Palpations: Abdomen is soft.      Tenderness: There is abdominal tenderness (mild, low abdominal).   Musculoskeletal:      Right lower leg: No edema.      Left lower leg: No edema.   Skin:     Coloration: Skin is pale. Skin is not jaundiced.   Neurological:      General: No focal deficit present.      Mental Status: He is alert and oriented to person, place, and time.   Psychiatric:         Mood and Affect: Mood normal.         Behavior: Behavior normal. Behavior is cooperative.            LABS:      Recent Labs   Lab 12/13/22  1800  12/14/22  0705   WBC 10.7 11.5   HGB 6.1* 7.5*   HCT 22.0* 25.2*   * 552*   MCV 66.5* 69.0*       Recent Labs   Lab 12/13/22  1800 12/14/22  0705   HGB 6.1* 7.5*   HCT 22.0* 25.2*        Recent Labs   Lab 12/13/22  1800 12/14/22  0705    138   K 4.2 3.6   CO2 23 21*   BUN 6.2* 6.4*   CREATININE 0.80 0.80   BILITOT 0.3 0.4   ALKPHOS 85 76   AST 11 10   ALT 9 6   LABPROT 6.3* 6.0*   ALBUMIN 2.9* 2.6*        No results for input(s): INR, PROTIME, PTT in the last 168 hours.     Recent Labs   Lab 12/13/22 2050   IRON 11*   FERRITIN 8.52*          IMAGING:   X-Ray Chest PA And Lateral    Result Date: 12/13/2022  EXAMINATION: XR CHEST PA AND LATERAL CLINICAL HISTORY: shortness of breath; TECHNIQUE: PA and lateral views of the chest were performed. COMPARISON: None FINDINGS: The lungs are clear, with normal appearance of pulmonary vasculature and no pleural effusion or pneumothorax. The cardiac silhouette is normal in size. The hilar and mediastinal contours are unremarkable. Bones are intact.     No acute abnormality. Electronically signed by: Marino Gatica Date:    12/13/2022 Time:    18:53        Assessment / Plan:       Ulcerative colitis:  - On Entyvio 300 mg every 4 weeks with improvement in disease except for persistent rectal inflammation.  He has been noncompliant with rectal topical therapy until recently, now using Uceris foam BID since last in clinic.  Has had persistent rectal bleeding with NASIM treated with iron infusions.  Still with some bleeding although he thinks the frequency of bleeding has lessened since being compliant with Uceris foam.   - Encouraged continued compliance with Uceris foam BID  - Continue Entyvio as outpatient  - Original plan to repeat flex sig after 6 weeks on Uceris foam BID to assess for response to rectal inflammation.  We discussed option to repeat flex sig now.  If flex sig showed persistent rectal disease with proximal healing, plan may remain the same in  regards to continued topical rectal therapy while continuing Entyvio  - check fecal calprotectin. Crp 6.9    Iron deficiency anemia:  -Hgb on admit 6.1-->2 units PRBCs-->7.5-->7.4  -Transfuse for Hgb <7. (Was given another unit today regardless)  -Start IV iron replacement while inpatient.  Placed order for ferrelicit  - Hgb was 7.5 g/dL in October.  Scheduled for iron infusion in Dec originally but this is too long to wait for iron infusion.  Will discuss further with iron infusion  - Originally unable to tolerate oral iron.  If can tolerate, then ferrous sulfate 325 mg daily recommended  - Plan EGD given recent diclofenac use to rule out any upper issues.  Discussed no NSAIDs with UC

## 2022-12-14 NOTE — PLAN OF CARE
Please see H and P for additional information. This is upper level addendum to Dr. Alcazar's H and P.    Mr. Yuan with PMH of Ulcerative Colitis and Anxiety presented to the ED with complaint of fatigue, weakness, lower abdominal cramps, and frequent bowel movements. He reportedly experiences worsening UC symptoms including >6 x bowel movements per day. He described these as loose stools. His current UC medications include Entyvio 300 mg every 4 weeks and Tacrolimus suppository. Last Colonoscopy was in 2020 and last flex sig was in 2021. He gets routine IV infusions and follows Dr. Philip. Pt reports worsening of UC symptoms since diagnosis in 2019. In the ED, H/H was 6.1/22.0, he is symptomatic , describing his symptoms as feeling weak and pale. Inflammatory markers including ESR and CRP are elevated. His FOBT was positive in the ED and on ARYAN there was no rectal mass palpable.     PE  Gen appearance: pale facial appearance, pale sclera  HEENT: EOMI, dry oral mucosa, tongue mildly pink  Abdomen: Soft, No distention, mildly tender bilateral lower quadrants, no guarding or rebound tenderness  MSK: 5/5 strength involving both upper and lower extremities  : FOBT positive, no rectal mass palpable  Skin: Slightly warm to touch, pale in color     Assessment and Plan  Symptomatic anemia  Ulcerative Colitis    - Plan is to Type and cross and give 2 units of packed RBC's  - Repeat CBC 4 hours after completion of blood transfusion  - Avoid Nsaids, hold Lovenox, start Protonix  - Iron panel, ferritin, stool culture, fecal leukocytes ordered   - 2 large bore IV in case pt require IV fluid boluses  - Continue cardiac monitoring and pulse oximetry  - NPO after midnight and consult GI services in the morning      Bee Man MD  -III

## 2022-12-14 NOTE — H&P
[facility] Medicine Wards History & Physical Note     Resident Team: [facility] Medicine List 1  Attending Physician: Em Edge MD  Resident: Bee Man MD  Intern: Jorge Alcazar MD     Date of Admit: 12/13/2022    Chief Complaint     Weakness (Pt reports feeling weak and SOB w/ activity. Hx UC, normally has iron infusions. Instructed to come to ED for labs. )     Subjective:      History of Present Illness:  Saji Gutierrez is a 22 y.o. male with PMH significant for UC (diagnosed in 2020), ADD and anxiety presented to ED on 12/13/2022  with a primary complaint of Weakness (Pt reports feeling weak and SOB w/ activity. Hx UC, normally has iron infusions. Instructed to come to ED for labs. )    Pt began feeling weak, dizzy and having shortness of breath on Sunday (12/11/22). Symptoms began to progressively worsen until this afternoon. Admits to bilateral lower quadrant abdominal pain. 5-7 loose stools per day. Denies blood in stool but does have bright red blood on toilet paper when he wipes. Admits to recent weight loss; weighed 230 about 3 months ago (September 2022) now down to 185 per weighed recorded 12/13/22. Decreased appetite; last meal was yesterday. Frequently uses hits of marijuana to help with pain control and increased appetitie; unable to quantify amount of times used in the last month.     Pertinent history of UC: Symptoms (rectal bleeding, diarrhea with increased urgency and lower abdominal pain) began in 2019 and diagnosed in 2020; currently sees Dr Bradshaw every 6 months. Receives Entyvio 300mg every 4 weeks (last infusion on 11/15/22); also receives iron transfusions (last infusion on 11/15/22). Flex sig done in 2021 Torrez score 2 UC moderately active in rectum and distal sigmoid colon unchanged from colonoscopy in 11/2020; prox sig and distal descending colon UC improved from 11/2020. Denies any prior hospitalizations for UC treatment. Denies any family history of IBD, colon polyps or colon cancer. Prior  IBD treatments Apriso 1.5 gm daily, Canasa 1 gm nightly, Uceris 9mg daily, and Prednisone taper in June 2022. Scheduled for repeat colonoscopy in Jan 2023.     ED course - Upon admission pt was tachycardic 103, bp stable 121/66, O2 stats 99% and Respiratory rate was 18. Chest x-ray was clear. EKG came back WNL. Labs were drawn H/H came back at 6.1/22, MCV 66.5, Trop <0.01. 2U of blood were ordered and IM team called for admission of symptomatic anemia. In ED pt had bowel movements and drops of blood were visible in the bowl. FOBT done in ED and was positive.     Review of Systems   Constitutional:  Positive for malaise/fatigue and weight loss. Negative for fever.   Eyes:  Positive for blurred vision and double vision.   Respiratory:  Positive for shortness of breath. Negative for cough and wheezing.    Cardiovascular:  Positive for palpitations. Negative for chest pain and leg swelling.   Gastrointestinal:  Positive for abdominal pain, diarrhea and nausea. Negative for constipation and vomiting.        Blood present with wiping   Genitourinary:  Negative for dysuria and hematuria.   Musculoskeletal:  Positive for back pain. Negative for falls and myalgias.   Neurological:  Positive for dizziness, weakness and headaches. Negative for tingling and tremors.     Past Medical History:  Past Medical History:   Diagnosis Date    Acid reflux     ADD (attention deficit disorder)     Anxiety     Ulcerative colitis      Past Surgical History:  Past Surgical History:   Procedure Laterality Date    COLONOSCOPY      SURGICAL REMOVAL OF PILONIDAL CYST       Family History:  Family History   Problem Relation Age of Onset    Diverticulitis Mother     Lupus Maternal Aunt      Social History:  Social History     Tobacco Use    Smoking status: Every Day     Packs/day: 0.50     Types: Cigars, Cigarettes    Smokeless tobacco: Never   Substance Use Topics    Alcohol use: Yes     Comment: socially- rare    Drug use: Yes     Types:  "Marijuana     Allergies:  Review of patient's allergies indicates:  No Known Allergies    Home Medications:  Prior to Admission medications    Medication Sig Start Date End Date Taking? Authorizing Provider   baclofen (LIORESAL) 10 MG tablet Take 10 mg by mouth 3 (three) times daily. 11/3/22   Historical Provider   budesonide (UCERIS) 2 mg/actuation Foam Place 2 mg rectally 2 (two) times a day. 11/15/22   Eleonora Philip MD   cyanocobalamin, vitamin B-12, 1,000 mcg/15 mL Liqd Take by mouth. gummie    Historical Provider   diclofenac (VOLTAREN) 75 MG EC tablet Take 75 mg by mouth 2 (two) times daily. 11/3/22   Historical Provider   EScitalopram oxalate (LEXAPRO) 10 MG tablet  22   Historical Provider   ferrous gluconate (FERGON) 324 MG tablet Take 324 mg by mouth daily with breakfast.    Historical Provider   HYDROcodone-acetaminophen (NORCO) 5-325 mg per tablet Take 1 tablet by mouth every 6 (six) hours as needed for Pain. 10/2/22   TIERNEY Figueroa   multivitamin capsule Take 1 capsule by mouth once daily.    Historical Provider   omeprazole (PRILOSEC) 20 MG capsule Take 20 mg by mouth once daily. 22   Historical Provider     Objective:   Last 24 Hour Vital Signs:  BP  Min: 118/97  Max: 128/72  Temp  Av °F (37.2 °C)  Min: 99 °F (37.2 °C)  Max: 99 °F (37.2 °C)  Pulse  Av.7  Min: 88  Max: 103  Resp  Av.5  Min: 17  Max: 18  SpO2  Av.7 %  Min: 99 %  Max: 100 %  Height  Av' 1" (185.4 cm)  Min: 6' 1" (185.4 cm)  Max: 6' 1" (185.4 cm)  Weight  Av kg (185 lb 3 oz)  Min: 84 kg (185 lb 3 oz)  Max: 84 kg (185 lb 3 oz)  Body mass index is 24.43 kg/m².  No intake/output data recorded.    Physical Exam  Constitutional:       General: He is not in acute distress.     Appearance: He is not ill-appearing.   HENT:      Nose: No congestion.      Mouth/Throat:      Mouth: Mucous membranes are dry.   Eyes:      Comments: Pale mucosa   Cardiovascular:      Rate and Rhythm: Normal rate and " regular rhythm.      Heart sounds: Normal heart sounds. No murmur heard.  Pulmonary:      Effort: Pulmonary effort is normal. No respiratory distress.      Breath sounds: Normal breath sounds. No wheezing.   Chest:      Chest wall: No tenderness.   Abdominal:      General: Abdomen is flat. Bowel sounds are normal. There is no distension.      Palpations: Abdomen is soft.      Tenderness: There is abdominal tenderness. There is no guarding or rebound.      Comments: BLQ tenderness present    Genitourinary:     Comments: No visibile blood present on rectal exam  Musculoskeletal:         General: No swelling or tenderness.   Skin:     Coloration: Skin is pale.   Neurological:      General: No focal deficit present.      Mental Status: He is oriented to person, place, and time.      Motor: No weakness.      Comments: 4/5 strength present in BUE   4/5 strength present in BLE      Laboratory:  Most Recent Data:  CBC:   Lab Results   Component Value Date    WBC 10.7 12/13/2022    HGB 6.1 (L) 12/13/2022    HCT 22.0 (L) 12/13/2022     (H) 12/13/2022    MCV 66.5 (L) 12/13/2022    RDW 20.7 (H) 12/13/2022     WBC Differential:   Recent Labs   Lab 12/13/22  1800   WBC 10.7   HGB 6.1*   HCT 22.0*   *   MCV 66.5*     BMP:   Lab Results   Component Value Date     12/13/2022    K 4.2 12/13/2022    CO2 23 12/13/2022    BUN 6.2 (L) 12/13/2022    CREATININE 0.80 12/13/2022    CALCIUM 8.9 12/13/2022     LFTs:   Lab Results   Component Value Date    ALBUMIN 2.9 (L) 12/13/2022    BILITOT 0.3 12/13/2022    AST 11 12/13/2022    ALKPHOS 85 12/13/2022    ALT 9 12/13/2022     Coags:   Lab Results   Component Value Date    INR 1.07 09/21/2020    PROTIME 13.6 09/21/2020    PTT 30.2 09/21/2020     FLP: No results found for: CHOL, HDL, LDLCALC, TRIG, CHOLHDL  DM:   Lab Results   Component Value Date    CREATININE 0.80 12/13/2022     Thyroid: No results found for: TSH, I6IJFWV, C3BHWZU, THYROIDAB, FREET4   Anemia:   Lab Results    Component Value Date    IRON 7 (L) 06/17/2022    TIBC 286 06/17/2022    FERRITIN 4.28 (L) 06/17/2022       Lab Results   Component Value Date    WTTLIWRQ92 1,496 (H) 06/17/2022       Lab Results   Component Value Date    FOLATE 11.8 06/17/2022        Cardiac:   Lab Results   Component Value Date    TROPONINI <0.010 12/13/2022     Urinalysis:   Lab Results   Component Value Date    PHUA 6.0 10/02/2022    UROBILINOGEN Normal 10/02/2022    WBCUA 0-5 10/02/2022       Trended Lab Data:  Recent Labs   Lab 12/13/22  1800   WBC 10.7   HGB 6.1*   HCT 22.0*   *   MCV 66.5*   RDW 20.7*      K 4.2   CO2 23   BUN 6.2*   CREATININE 0.80   ALBUMIN 2.9*   BILITOT 0.3   AST 11   ALKPHOS 85   ALT 9       Trended Cardiac Data:  Recent Labs   Lab 12/13/22  1800   TROPONINI <0.010       Radiology:  Imaging Results              X-Ray Chest PA And Lateral (Final result)  Result time 12/13/22 18:53:34      Final result by Marino Gatica MD (12/13/22 18:53:34)                   Impression:      No acute abnormality.      Electronically signed by: Marino Gatica  Date:    12/13/2022  Time:    18:53               Narrative:    EXAMINATION:  XR CHEST PA AND LATERAL    CLINICAL HISTORY:  shortness of breath;    TECHNIQUE:  PA and lateral views of the chest were performed.    COMPARISON:  None    FINDINGS:  The lungs are clear, with normal appearance of pulmonary vasculature and no pleural effusion or pneumothorax.    The cardiac silhouette is normal in size. The hilar and mediastinal contours are unremarkable.    Bones are intact.                                    Assessment & Plan:   Symptomatic Anemia secondary to UC   Admission H/H 6.1/22  Admission VS , RR 18 /66  Complaints of weakness, fatigue, dizziness with standing and shortness of breath present  Skin and eye mucosa pale upon exam  History of UC diagnosed in 2020; currently on Entyvio 300mg [id number]  Receiving iron transfusions - last transfusion  11/15/22  FOBT + in ED   2U of blood ordered, recheck CBC 4 hours after transfusion  Repeat iron panel ordered - prior (6/17/22) iron 7, ferritin 4.28, iron sat 2 TIBC 286  CRP and ESR ordered  Stool culture ordered  Fecal Lactoferrin ordered  NPO after midnight  Tylenol prn for pain  GI consult placed in AM - holding home medications until GI recommendations given    Anxiety  Holding home Lexapro - can restart tomorrow morning after any ordered procedures     H/O tobacco use  Pt denied need for nicotine patch  Currently smokes 1 cigarette per day  Has been smoking since he was 15      CODE STATUS: Full Code  Access: Peripheral IV  Antibiotics: None  Diet: NPO after midnight  DVT Prophylaxis: SCD; hold Lovenox due to GI bleed  GI Prophylaxis: Protonix 40mg bid  Fluids: 125 ml/hr      Disposition: Pt admitted for symptomatic anemia. Transfuse 2U of blood and repeat CBC 4 hours post transfusion. Follow up with GI recs in the AM.       Jorge Alcazar MD  [facility]

## 2022-12-14 NOTE — PLAN OF CARE
12/14/22 1132   Discharge Assessment   Assessment Type Discharge Planning Assessment   Confirmed/corrected address, phone number and insurance Yes   Confirmed Demographics Correct on Facesheet   Source of Information patient   Reason For Admission symptomatic anemia   People in Home parent(s)   Facility Arrived From: Home   Do you expect to return to your current living situation? Yes   Do you have help at home or someone to help you manage your care at home? Yes   Who are your caregiver(s) and their phone number(s)? Tess (mother) [phone]   Prior to hospitilization cognitive status: Alert/Oriented   Current cognitive status: Alert/Oriented   Readmission within 30 days? No   Patient currently being followed by outpatient case management? No   Do you currently have service(s) that help you manage your care at home? No   Do you take prescription medications? Yes   Do you have prescription coverage? Yes   Coverage Medicaid/[facility] [employer] [facility] ([place])   Do you have any problems affording any of your prescribed medications? No   Is the patient taking medications as prescribed? yes   Who is going to help you get home at discharge? Tess (mother)   How do you get to doctors appointments? car, drives self;family or friend will provide   Are you on dialysis? No   Discharge Plan A Home   Discharge Plan discussed with: Patient   Financial Resource Strain   How hard is it for you to pay for the very basics like food, housing, medical care, and heating? Not hard   Housing Stability   In the last 12 months, was there a time when you were not able to pay the mortgage or rent on time? N   In the last 12 months, was there a time when you did not have a steady place to sleep or slept in a shelter (including now)? N   Transportation Needs   In the past 12 months, has lack of transportation kept you from medical appointments or from getting medications? no   In the past 12 months, has lack of  transportation kept you from meetings, work, or from getting things needed for daily living? No   Food Insecurity   Within the past 12 months, you worried that your food would run out before you got the money to buy more. Never true   Within the past 12 months, the food you bought just didn't last and you didn't have money to get more. Never true   Social Connections   Are you , , , , never , or living with a partner? Never marrie   Alcohol Use   Q1: How often do you have a drink containing alcohol? Never   Q2: How many drinks containing alcohol do you have on a typical day when you are drinking? None   Q3: How often do you have six or more drinks on one occasion? Never     CM screened pt at bedside where he was joined by his EC/mother Tess. She can be reached at [phone]. Tess to transport pt home at [place]. No needs identified at the time. CM to follow clinical course.

## 2022-12-14 NOTE — PROGRESS NOTES
[facility] Progress Note      Subjective:   Brief HPI:  Saji Guteirrez is a 22 y.o. male with PMH significant for UC (diagnosed in 2020), ADD and anxiety presented to ED on 12/13/2022  with a primary complaint of Weakness (Pt reports feeling weak and SOB w/ activity. Hx UC, normally has iron infusions. Instructed to come to ED for labs. )     Pt began feeling weak, dizzy and having shortness of breath on Sunday (12/11/22). Symptoms began to progressively worsen until this afternoon. Admits to bilateral lower quadrant abdominal pain. 5-7 loose stools per day. Denies blood in stool but does have bright red blood on toilet paper when he wipes. Admits to recent weight loss; weighed 230 about 3 months ago (September 2022) now down to 185 per weighed recorded 12/13/22. Decreased appetite; last meal was yesterday. Frequently uses hits of marijuana to help with pain control and increased appetitie; unable to quantify amount of times used in the last month.    Interval History:   No acute events reported overnight. VSS, HR 90-94, /66 - 118/64, AF, o2 97-98%. Pt tolerated 2U of blood well; feeling much better this AM. Denies any lingering weakness, dizziness or SOB. Currently NPO pending further GI recs.   This morning he denies fevers, headache, chest pain, N/V/D and abdominal pain.     ROS:  As per HPI    Objective:   Vitals:  Temp: 98.4 °F (36.9 °C) (12/14 0330)  Pulse: 90 (12/14 0330)  Resp: 16 (12/14 0330)  BP: 111/66 (12/14 0330)  SpO2: 98 % (12/14 0330)    Physical Exam  Constitutional:       General: He is not in acute distress.     Appearance: Normal appearance.   Cardiovascular:      Rate and Rhythm: Normal rate and regular rhythm.      Heart sounds: Normal heart sounds.   Pulmonary:      Effort: No respiratory distress.      Breath sounds: Normal breath sounds.   Abdominal:      General: There is no distension.      Palpations: Abdomen is soft.      Tenderness: There is abdominal tenderness. There is  no guarding.   Skin:     Coloration: Skin is pale.   Neurological:      Mental Status: He is oriented to person, place, and time.      Laboratory:  Recent Lab Results  (Last 5 results in the past 24 hours)        12/13/22 2336   12/13/22 2242 12/13/22 2050 12/13/22 2000 12/13/22  1800        Unit Blood Type Code         5100  [P]                5100       Unit Expiration         [id number]  [P]                [id number]       Albumin/Globulin Ratio         0.9       Albumin         2.9       Alkaline Phosphatase         85       ALT         9       Appearance, UA   Turbid             AST         11       Bacteria, UA   Trace             Baso #         0.06       Basophil %         0.6       BILIRUBIN TOTAL         0.3       Bilirubin, UA   Negative             BUN         6.2       Calcium         8.9       Chloride         107       CO2         23       Color, UA   Light-Yellow             Creatinine         0.80       CROSSMATCH INTERPRETATION         Compatible  [P]                Compatible       CRP     6.90           DISPENSE STATUS         Issued  [P]                Transfused       eGFR         >60       Eos #         0.93       Eosinophil %         8.7       Ferritin     8.52           Globulin, Total         3.4       Glucose         87       Glucose, UA   Normal             Group & Rh       O POS  Comment: @12/13/22 20:50 by CH9:   O POS       HEMATOCRIT         22.0       HEMOGLOBIN         6.1       Hyaline Casts, UA   0-2             Immature Grans (Abs)         0.06       Immature Granulocytes         0.6       Indirect [place] GEL         NEG       Iron     11           Iron Binding Capacity Unsaturated     236           Iron Saturation     4           Ketones, UA   Negative             Leukocyte Negative Control Negative               Leukocyte Positive Control Positive               Leukocytes, UA   Negative             Lymph #         2.74       LYMPH %         25.6       MCH          18.4       MCHC         27.7       MCV         66.5       Mono #         0.90       Mono %         8.4       MPV         9.5       Mucous, UA   Many             Neut #         6.0       Neut %         56.1       NITRITE UA   Negative             nRBC         0.2       Occult Blood UA   Negative             pH, UA   6.0             Platelets         608       Potassium         4.2       Product Code         [id number]  [P]                [id number]       PROTEIN TOTAL         6.3       Protein, UA   Trace             RBC         3.31       RBC, UA   0-5             RDW         20.7       Sed Rate     41           Sodium         137       Specific Gravity,UA   1.013             Squamous Epithelial Cells, UA   Trace             TIBC     247           Transferrin     226           Troponin I         <0.010       Unit ABO/Rh         O POS  [P]                O POS       UNIT NUMBER         [id number]  [P]                [id number]       Urobilinogen, UA   Normal             WBC, UA   0-5             Stool WBC Positive               WBC         10.7                               [P] - Preliminary Result                Radiology:  X-Ray Chest PA And Lateral    Result Date: 12/13/2022  No acute abnormality. Electronically signed by: Marino Gatica Date:    12/13/2022 Time:    18:53       Assessment & Plan:   Symptomatic Anemia likely secondary to UC   History of UC diagnosed in 2020; currently on Entyvio 300mg [id number]  FOBT + in ED   Received 2U of blood  H/H increased from 6.1/22.0 to 7.5/25.2  Repeat CBC this afternoon; follow up on H/H levels and assess need for another transfusion  Iron panel - iron 11, ferritin 8.52, iron sat 4, TIBC 247  CRP 6.9, ESR 41   Stool culture ordered  Fecal WBC+  NPO after midnight; regular diet once cleared by GI  Tylenol prn for pain  GI consult placed in AM - holding home medications until GI recommendations given     Anxiety  Holding home Lexapro - can restart tomorrow morning  after any ordered procedures      H/O tobacco use  Pt denied need for nicotine patch  Currently smokes 1 cigarette per day  Has been smoking since he was 15        CODE STATUS: Full Code  Access: Peripheral IV  Antibiotics: None  Diet: NPO after midnight; Regular diet after GI clearance  DVT Prophylaxis: SCD, Lovenox  GI Prophylaxis: Protonix 40mg bid  Fluids: 125 NS ml/hr      Disposition: Pt admitted for symptomatic anemia. Recieved 2U of blood and repeat CBC H/H showed 7.5/25.2. Follow up with GI recs.         Jorge Alcazar MD  [facility]

## 2022-12-14 NOTE — PROGRESS NOTES
Inpatient Nutrition Assessment    Admit Date: 12/13/2022   Total duration of encounter: 1 day     Nutrition Recommendation/Prescription     When appropriate--ADAT to Low residue  Order vanilla boost plus tid --360kcal, 14 gm protein per serving  If unable to advance diet--suggest supplemental PPN --Clinimix 4.25/5 @ 110 ml/hr with lipids 20% 250 ml daily to provide 1398 calories, 112 gm protein.   MVI/fe  Biweekly wt  Suggest megace to stimulate appetite   Will monitor nutrition status     Communication of Recommendations: reviewed with patient/caregiver and reviewed with nurse    Nutrition Assessment     Malnutrition Assessment/Nutrition-Focused Physical Exam    Malnutrition in the context of chronic illness  Degree of Malnutrition: severe malnutrition  Energy Intake: </= 75% of estimated energy requirement for >/= 1 month  Interpretation of Weight Loss: >10% in 6 months  Body Fat: mild depletion  Area of Body Fat Loss: orbital region  and upper arm region - triceps / biceps  Muscle Mass Loss: mild depletion  Area of Muscle Mass Loss: clavicle bone region - pectoralis major, deltoid, trapezius muscles and clavicle and acromion bone region - deltoid muscle  Fluid Accumulation: does not meet criteria  Edema: no edema present  Reduced  Strength: unable to obtain  A minimum of two characteristics is recommended for diagnosis of either severe or non-severe malnutrition.    Chart Review    Reason Seen: continuous nutrition monitoring and malnutrition screening tool    Diagnosis:  Anemia 2 ulcerative colitis, anxiety     Relevant Medical History: ulcerative colitis, anxiety     Nutrition-Related Medications: IVF 0.9% NaCL @ 125ml/hr; pantoprazole   Calorie Containing IV Medications: no significant kcals from medications at this time    Nutrition-Related Labs:  (12-14) H/H  7.5/25.2(L) Gluc 92 Bun 6.4 Cr 0.8 K 3.6     Diet/PN Order: Diet clear liquid  Oral Supplement Order: none  Tube Feeding Order: none  Appetite/Oral  "Intake: poor/25-50% of meals  Factors Affecting Nutritional Intake: abdominal pain and decreased appetite  Food/Oriental orthodox/Cultural Preferences:  vanilla boost   Food Allergies: none reported       Wound(s):   none    Comments    () Pt reported not eating well for sometime; poor appetite; eating 50% or less of meals; + progressive wt loss--approx 11% over 6 months. Mild fat/muscle wasting. Pt willing to try oral supplement. Suggest megace to stimulate appetite.     Anthropometrics    Height: 6' 1" (185.4 cm) Height Method: Stated  Last Weight: 84 kg (185 lb 3 oz) (22) Weight Method: Standard Scale  BMI (Calculated): 24.4  BMI Classification: normal (BMI 18.5-24.9)        Ideal Body Weight (IBW), Male: 184 lb     % Ideal Body Weight, Male (lb): 100.65 %                 Usual Body Weight (UBW), k.3 kg (12% wt loss over 6 months)  % Usual Body Weight: 88.33     Usual Weight Provided By: patient, family/caregiver, and EMR weight history    Wt Readings from Last 5 Encounters:   22 84 kg (185 lb 3 oz)   22 86.7 kg (191 lb 2.2 oz)   11/15/22 91.8 kg (202 lb 6.4 oz)   22 92 kg (202 lb 13.2 oz)   10/02/22 98 kg (215 lb 15.1 oz)     Weight Change(s) Since Admission:  Admit Weight: 84 kg (185 lb 3 oz) (22 215)  Pt reported 11% wt loss over 6 months     Estimated Needs    Weight Used For Calorie Calculations: 84 kg (185 lb 3 oz)  Energy Calorie Requirements (kcal): 2520 kcal/d; 30 casey/kg  Energy Need Method: Kcal/kg  Weight Used For Protein Calculations: 84 kg (185 lb 3 oz)  Protein Requirements: 126 gm prot/d; 1.5 gm/kg  Fluid Requirements (mL): 2520 ml/d; 1ml/casey  Temp: 98 °F (36.7 °C)       Enteral Nutrition    Patient not receiving enteral nutrition at this time.    Parenteral Nutrition    Patient not receiving parenteral nutrition support at this time.    Evaluation of Received Nutrient Intake    Calories: not meeting estimated needs  Protein: not meeting estimated " needs    Patient Education    Not applicable.    Nutrition Diagnosis     PES: Malnutrition related to ulcerative colitis  as evidenced by po intake < 50% meals; 11% wt loss 6 months; fat/muscle wasting . (new)    Interventions/Goals     Intervention(s): modified composition of meals/snacks, commercial beverage, multivitamin/mineral supplement therapy, and collaboration with other providers  Goal: Meet greater than 75% of nutritional needs by follow-up. (new)    Monitoring & Evaluation     Dietitian will monitor food and beverage intake and weight.  Nutrition Risk/Follow-Up: high (follow-up in 1-4 days)   Please consult if re-assessment needed sooner.

## 2022-12-15 ENCOUNTER — ANESTHESIA EVENT (OUTPATIENT)
Dept: ENDOSCOPY | Facility: HOSPITAL | Age: 22
DRG: 387 | End: 2022-12-15
Payer: MEDICAID

## 2022-12-15 ENCOUNTER — ANESTHESIA (OUTPATIENT)
Dept: ENDOSCOPY | Facility: HOSPITAL | Age: 22
DRG: 387 | End: 2022-12-15
Payer: MEDICAID

## 2022-12-15 LAB
ALBUMIN SERPL-MCNC: 2.5 G/DL (ref 3.5–5)
ALBUMIN/GLOB SERPL: 0.7 RATIO (ref 1.1–2)
ALP SERPL-CCNC: 82 UNIT/L (ref 40–150)
ALT SERPL-CCNC: 7 UNIT/L (ref 0–55)
AST SERPL-CCNC: 12 UNIT/L (ref 5–34)
BASOPHILS # BLD AUTO: 0.09 X10(3)/MCL (ref 0–0.2)
BASOPHILS NFR BLD AUTO: 0.8 %
BILIRUBIN DIRECT+TOT PNL SERPL-MCNC: 0.3 MG/DL
BUN SERPL-MCNC: 5.2 MG/DL (ref 8.9–20.6)
CALCIUM SERPL-MCNC: 8.5 MG/DL (ref 8.4–10.2)
CHLORIDE SERPL-SCNC: 111 MMOL/L (ref 98–107)
CO2 SERPL-SCNC: 22 MMOL/L (ref 22–29)
COLOR STL: ABNORMAL
COLOR STL: ABNORMAL
CONSISTENCY STL: ABNORMAL
CONSISTENCY STL: ABNORMAL
CREAT SERPL-MCNC: 0.76 MG/DL (ref 0.73–1.18)
EOSINOPHIL # BLD AUTO: 0.99 X10(3)/MCL (ref 0–0.9)
EOSINOPHIL NFR BLD AUTO: 8.9 %
ERYTHROCYTE [DISTWIDTH] IN BLOOD BY AUTOMATED COUNT: 23.5 % (ref 11.6–14.4)
GFR SERPLBLD CREATININE-BSD FMLA CKD-EPI: >60 MLS/MIN/1.73/M2
GLOBULIN SER-MCNC: 3.4 GM/DL (ref 2.4–3.5)
GLUCOSE SERPL-MCNC: 99 MG/DL (ref 74–100)
HCT VFR BLD AUTO: 27 % (ref 42–52)
HEMOCCULT SP2 STL QL: POSITIVE
HEMOCCULT SP3 STL QL: POSITIVE
HGB BLD-MCNC: 8.3 GM/DL (ref 14–18)
IMM GRANULOCYTES # BLD AUTO: 0.29 X10(3)/MCL (ref 0–0.04)
IMM GRANULOCYTES NFR BLD AUTO: 2.6 %
LYMPHOCYTES # BLD AUTO: 2.77 X10(3)/MCL (ref 0.6–4.6)
LYMPHOCYTES NFR BLD AUTO: 24.9 %
MCH RBC QN AUTO: 22.4 PG
MCHC RBC AUTO-ENTMCNC: 30.7 MG/DL (ref 33–36)
MCV RBC AUTO: 72.8 FL (ref 80–94)
MONOCYTES # BLD AUTO: 0.99 X10(3)/MCL (ref 0.1–1.3)
MONOCYTES NFR BLD AUTO: 8.9 %
NEUTROPHILS # BLD AUTO: 6.01 X10(3)/MCL (ref 2.1–9.2)
NEUTROPHILS NFR BLD AUTO: 53.9 %
NRBC BLD AUTO-RTO: 0.4 % (ref 0–1)
PLATELET # BLD AUTO: 508 X10(3)/MCL (ref 140–371)
PMV BLD AUTO: 9.2 FL (ref 9.4–12.4)
POTASSIUM SERPL-SCNC: 3.8 MMOL/L (ref 3.5–5.1)
PROT SERPL-MCNC: 5.9 GM/DL (ref 6.4–8.3)
RBC # BLD AUTO: 3.71 X10(6)/MCL (ref 4.7–6.1)
SODIUM SERPL-SCNC: 138 MMOL/L (ref 136–145)
WBC # SPEC AUTO: 11.1 X10(3)/MCL (ref 4.5–11.5)

## 2022-12-15 PROCEDURE — 88305 TISSUE EXAM BY PATHOLOGIST: CPT | Performed by: INTERNAL MEDICINE

## 2022-12-15 PROCEDURE — G0378 HOSPITAL OBSERVATION PER HR: HCPCS

## 2022-12-15 PROCEDURE — 37000008 HC ANESTHESIA 1ST 15 MINUTES: Performed by: INTERNAL MEDICINE

## 2022-12-15 PROCEDURE — 80053 COMPREHEN METABOLIC PANEL: CPT

## 2022-12-15 PROCEDURE — 63600175 PHARM REV CODE 636 W HCPCS: Performed by: NURSE ANESTHETIST, CERTIFIED REGISTERED

## 2022-12-15 PROCEDURE — 43235 EGD DIAGNOSTIC BRUSH WASH: CPT | Performed by: INTERNAL MEDICINE

## 2022-12-15 PROCEDURE — 36415 COLL VENOUS BLD VENIPUNCTURE: CPT

## 2022-12-15 PROCEDURE — 25000003 PHARM REV CODE 250: Performed by: INTERNAL MEDICINE

## 2022-12-15 PROCEDURE — 94761 N-INVAS EAR/PLS OXIMETRY MLT: CPT | Mod: 59

## 2022-12-15 PROCEDURE — 63600175 PHARM REV CODE 636 W HCPCS: Performed by: ANESTHESIOLOGY

## 2022-12-15 PROCEDURE — 63600175 PHARM REV CODE 636 W HCPCS: Performed by: INTERNAL MEDICINE

## 2022-12-15 PROCEDURE — 25000003 PHARM REV CODE 250: Performed by: NURSE PRACTITIONER

## 2022-12-15 PROCEDURE — 88342 IMHCHEM/IMCYTCHM 1ST ANTB: CPT | Performed by: INTERNAL MEDICINE

## 2022-12-15 PROCEDURE — 85025 COMPLETE CBC W/AUTO DIFF WBC: CPT

## 2022-12-15 PROCEDURE — 96375 TX/PRO/DX INJ NEW DRUG ADDON: CPT | Mod: 59

## 2022-12-15 PROCEDURE — 96376 TX/PRO/DX INJ SAME DRUG ADON: CPT | Mod: 59

## 2022-12-15 PROCEDURE — 21400001 HC TELEMETRY ROOM

## 2022-12-15 PROCEDURE — 27201423 OPTIME MED/SURG SUP & DEVICES STERILE SUPPLY: Performed by: INTERNAL MEDICINE

## 2022-12-15 PROCEDURE — 25000003 PHARM REV CODE 250: Performed by: NURSE ANESTHETIST, CERTIFIED REGISTERED

## 2022-12-15 PROCEDURE — 96366 THER/PROPH/DIAG IV INF ADDON: CPT | Mod: 59

## 2022-12-15 PROCEDURE — 83993 ASSAY FOR CALPROTECTIN FECAL: CPT | Performed by: INTERNAL MEDICINE

## 2022-12-15 PROCEDURE — 45331 SIGMOIDOSCOPY AND BIOPSY: CPT | Performed by: INTERNAL MEDICINE

## 2022-12-15 RX ORDER — PREDNISONE 10 MG/1
TABLET ORAL
Qty: 133 TABLET | Refills: 0 | Status: SHIPPED | OUTPATIENT
Start: 2022-12-15 | End: 2023-02-09

## 2022-12-15 RX ORDER — LIDOCAINE HYDROCHLORIDE 10 MG/ML
1 INJECTION, SOLUTION EPIDURAL; INFILTRATION; INTRACAUDAL; PERINEURAL ONCE
Status: DISCONTINUED | OUTPATIENT
Start: 2022-12-15 | End: 2022-12-16 | Stop reason: HOSPADM

## 2022-12-15 RX ORDER — ESCITALOPRAM OXALATE 10 MG/1
10 TABLET ORAL DAILY
Status: DISCONTINUED | OUTPATIENT
Start: 2022-12-16 | End: 2022-12-16 | Stop reason: HOSPADM

## 2022-12-15 RX ORDER — PROPOFOL 10 MG/ML
VIAL (ML) INTRAVENOUS
Status: DISCONTINUED | OUTPATIENT
Start: 2022-12-15 | End: 2022-12-15

## 2022-12-15 RX ORDER — LIDOCAINE HYDROCHLORIDE 20 MG/ML
INJECTION INTRAVENOUS
Status: DISCONTINUED | OUTPATIENT
Start: 2022-12-15 | End: 2022-12-15

## 2022-12-15 RX ORDER — SODIUM CHLORIDE, SODIUM LACTATE, POTASSIUM CHLORIDE, CALCIUM CHLORIDE 600; 310; 30; 20 MG/100ML; MG/100ML; MG/100ML; MG/100ML
INJECTION, SOLUTION INTRAVENOUS CONTINUOUS
Status: DISCONTINUED | OUTPATIENT
Start: 2022-12-15 | End: 2022-12-16 | Stop reason: HOSPADM

## 2022-12-15 RX ADMIN — LIDOCAINE HYDROCHLORIDE 100 MG: 20 INJECTION, SOLUTION INTRAVENOUS at 01:12

## 2022-12-15 RX ADMIN — METHYLPREDNISOLONE SODIUM SUCCINATE 20 MG: 40 INJECTION, POWDER, FOR SOLUTION INTRAMUSCULAR; INTRAVENOUS at 09:12

## 2022-12-15 RX ADMIN — PROPOFOL 170 MG: 10 INJECTION, EMULSION INTRAVENOUS at 01:12

## 2022-12-15 RX ADMIN — SODIUM CHLORIDE, POTASSIUM CHLORIDE, SODIUM LACTATE AND CALCIUM CHLORIDE: 600; 310; 30; 20 INJECTION, SOLUTION INTRAVENOUS at 01:12

## 2022-12-15 RX ADMIN — Medication 2 ENEMA: at 11:12

## 2022-12-15 RX ADMIN — PANTOPRAZOLE SODIUM 40 MG: 40 TABLET, DELAYED RELEASE ORAL at 09:12

## 2022-12-15 RX ADMIN — METHYLPREDNISOLONE SODIUM SUCCINATE 20 MG: 40 INJECTION, POWDER, FOR SOLUTION INTRAMUSCULAR; INTRAVENOUS at 04:12

## 2022-12-15 RX ADMIN — SODIUM CHLORIDE 125 MG: 9 INJECTION, SOLUTION INTRAVENOUS at 02:12

## 2022-12-15 RX ADMIN — PROPOFOL 200 MCG/KG/MIN: 10 INJECTION, EMULSION INTRAVENOUS at 01:12

## 2022-12-15 NOTE — ANESTHESIA POSTPROCEDURE EVALUATION
Anesthesia Post Evaluation    Patient: Saji Gutierrez    Procedure(s) Performed: Procedure(s) (LRB):  EGD (ESOPHAGOGASTRODUODENOSCOPY) (Left)  SIGMOIDOSCOPY, FLEXIBLE (N/A)  BIOPSY    Final Anesthesia Type: general      Patient location during evaluation: GI PACU  Patient participation: Yes- Able to Participate  Level of consciousness: awake and alert  Post-procedure vital signs: reviewed and stable  Pain management: adequate  Airway patency: patent    PONV status at discharge: No PONV  Anesthetic complications: no      Cardiovascular status: stable  Respiratory status: spontaneous ventilation and unassisted  Hydration status: euvolemic  Follow-up not needed.

## 2022-12-15 NOTE — PROVATION PATIENT INSTRUCTIONS
Discharge Summary/Instructions after an Endoscopic Procedure  Patient Name: Saji Gutierrez  Patient MRN: [id number]  Patient YOB: 2000  Thursday, December 15, 2022  Eleonora Philip MD  Dear patient,  As a result of recent federal legislation (The Federal Cures Act), you may   receive lab or pathology results from your procedure in your [facility]   account before your physician is able to contact you. Your physician or   their representative will relay the results to you with their   recommendations at their soonest availability.  Thank you,  RESTRICTIONS:  During your procedure today, you received medications for sedation.  These   medications may affect your judgment, balance and coordination.  Therefore,   for 24 hours, you have the following restrictions:   - DO NOT drive a car, operate machinery, make legal/financial decisions,   sign important papers or drink alcohol.    ACTIVITY:  Today: no heavy lifting, straining or running due to procedural   sedation/anesthesia.  The following day: return to full activity including work.  DIET:  Eat and drink normally unless instructed otherwise.     TREATMENT FOR COMMON SIDE EFFECTS:  - Mild abdominal pain, nausea, belching, bloating or excessive gas:  rest,   eat lightly and use a heating pad.  - Sore Throat: treat with throat lozenges and/or gargle with warm salt   water.  - Because air was used during the procedure, expelling large amounts of air   from your rectum or belching is normal.  - If a bowel prep was taken, you may not have a bowel movement for 1-3 days.    This is normal.  SYMPTOMS TO WATCH FOR AND REPORT TO YOUR PHYSICIAN:  1. Abdominal pain or bloating, other than gas cramps.  2. Chest pain.  3. Back pain.  4. Signs of infection such as: chills or fever occurring within 24 hours   after the procedure.  5. Rectal bleeding, which would show as bright red, maroon, or black stools.   (A tablespoon of blood from the rectum is not serious,  especially if   hemorrhoids are present.)  6. Vomiting.  7. Weakness or dizziness.  GO DIRECTLY TO THE NEAREST EMERGENCY ROOM IF YOU HAVE ANY OF THE FOLLOWING:      Difficulty breathing              Chills and/or fever over 101 F   Persistent vomiting and/or vomiting blood   Severe abdominal pain   Severe chest pain   Black, tarry stools   Bleeding- more than one tablespoon   Any other symptom or condition that you feel may need urgent attention  Your doctor recommends these additional instructions:  If any biopsies were taken, your doctors clinic will contact you in 1 to 2   weeks with any results.  - [facility].   - Resume regular diet today.   - Will plan to stop Vedolizumab.  Will discuss with patient options for   alternative biologic or small molecule therapy for continued UC treatment.  - Continue IV iron replacement  - Continue present medications.   - Await pathology results.  For questions, problems or results please call your physician - Eleonora Philip MD at .  [facility] , EMERGENCY ROOM PHONE NUMBER: [phone]  IF A COMPLICATION OR EMERGENCY SITUATION ARISES AND YOU ARE UNABLE TO REACH   YOUR PHYSICIAN - GO DIRECTLY TO THE EMERGENCY ROOM.  MD Eleonora Paulino MD  12/15/2022 5:41:31 PM  This report has been verified and signed electronically.  Dear patient,  As a result of recent federal legislation (The Federal Cures Act), you may   receive lab or pathology results from your procedure in your [facility]   account before your physician is able to contact you. Your physician or   their representative will relay the results to you with their   recommendations at their soonest availability.  Thank you,  PROVATION

## 2022-12-15 NOTE — PROGRESS NOTES
[facility] Progress Note      Subjective:   Brief HPI:  Saji Gutierrez is a 22 y.o. male with PMH significant for UC (diagnosed in 2020), ADD and anxiety presented to ED on 12/13/2022  with a primary complaint of weakness. Pt began feeling weak, dizzy and having shortness of breath on Sunday (12/11/22). Symptoms began to progressively worsen until this afternoon. Admits to bilateral lower quadrant abdominal pain. 5-7 loose stools per day. Denies blood in stool but does have bright red blood on toilet paper when he wipes. Admits to recent weight loss; weighed 230 about 3 months ago (September 2022) now down to 185 per weighed recorded 12/13/22. Decreased appetite; last meal was yesterday. Frequently uses hits of marijuana to help with pain control and increased appetitie; unable to quantify amount of times used in the last month.    Interval History:   No acute events reported overnight. VSS; HR 75-90, /64 - 122/72, AF, o2 %. Pt received 3rd units of blood yesterday. Admits to feeling much better this AM. Denies any persistent weakness, dizziness or SOB. Currently NPO pending GI scope today. This morning he denies fevers, headache, chest pain, N/V/D and abdominal pain.     ROS:  As per HPI    Objective:   Vitals:  Temp: 98.2 °F (36.8 °C) (12/15 0617)  Pulse: 77 (12/15 0617)  Resp: 20 (12/15 0016)  BP: 107/64 (12/15 0617)  SpO2: 99 % (12/15 0617)    Physical Exam  Constitutional:       General: He is not in acute distress.     Appearance: Normal appearance.   Cardiovascular:      Rate and Rhythm: Normal rate and regular rhythm.      Heart sounds: Normal heart sounds.   Pulmonary:      Effort: No respiratory distress.      Breath sounds: Normal breath sounds.   Abdominal:      General: There is no distension.      Palpations: Abdomen is soft.      Tenderness: There is no abdominal tenderness. There is no guarding.   Skin:     Coloration: Skin is not pale.   Neurological:      Mental Status: He is  oriented to person, place, and time.      Laboratory:  Recent Lab Results         12/15/22  0406   12/15/22  0024   12/14/22  2327   12/14/22  1449        Albumin/Globulin Ratio 0.7             Albumin 2.5             Alkaline Phosphatase 82             ALT 7             Aniso       2+       AST 12             Baso # 0.09       0.05       Basophil % 0.8       0.5       BILIRUBIN TOTAL 0.3             BUN 5.2             Calcium 8.5             Chloride 111             CO2 22             Creatinine 0.76             eGFR >60             Elliptocytosis       1+       Eos # 0.99       0.87       Eosinophil % 8.9       9.3       Globulin, Total 3.4             Glucose 99             HEMATOCRIT 27.0       25.2       HEMOGLOBIN 8.3       7.4       Hypo       1+       Immature Grans (Abs) 0.29       0.06       Immature Granulocytes 2.6       0.6       Lymph # 2.77       2.97       LYMPH % 24.9       31.8       MCH 22.4       20.5       MCHC 30.7       29.4       MCV 72.8       69.8       Microcyte       1+       Mono # 0.99       0.93       Mono % 8.9       9.9       MPV 9.2       9.2       Neut # 6.01       4.47       Neut % 53.9       47.9       nRBC 0.4       0.2       Occult Blood Stool 2     Positive         Occult Blood Stool 3   Positive           Ovalocytes       Slight       Platelet Estimate       Increased       Platelets 508       518       Poikilocytosis       1+       Poly       Slight       Potassium 3.8             PROTEIN TOTAL 5.9             RBC 3.71       3.61       RDW 23.5       22.3       Sodium 138             Stool Color 2     Brown         Stool Color 3   Brown           Stool Consistancy 2     liquid         Stool Consistancy 3   liquid           WBC 11.1       9.4                Assessment & Plan:   Symptomatic Anemia likely secondary to UC   H/O UC diagnosed in 2020; currently on Entyvio 300mg [id number]  FOBT +    Received 3U of blood since admission  H/H increased to 8.3/27; transfuse if <7  Iron  panel - iron 11, ferritin 8.52, iron sat 4, TIBC 247  Stool culture ordered  Fecal WBC+  Tylenol prn for pain  GI following and plan for EGD and Flex sig today. Recommended initiating of iron transfusion while inpatient. Will follow up with further recs provided today after scope.      Anxiety  Holding home Lexapro - can restart tomorrow morning after any ordered procedures      H/O tobacco use  Pt denied desire for nicotine patch  Currently smokes 1 cigarette per day  Has been smoking since he was 15        CODE STATUS: Full Code  Access: Peripheral IV  Antibiotics: None  Diet: NPO after midnight; Regular diet after GI clearance  DVT Prophylaxis: SCD, Lovenox  GI Prophylaxis: Protonix 40mg bid  Fluids: 125 NS ml/hr      Disposition: Pt admitted for symptomatic anemia. Recieved 3U of blood and symptoms have now resolved. Plan for GI scopes today and will follow up with any further recs.          Jorge Alcazar MD  [facility]

## 2022-12-15 NOTE — TRANSFER OF CARE
Anesthesia Transfer of Care Note    Patient: Saji Gutierrez    Procedure(s) Performed: Procedure(s) (LRB):  EGD (ESOPHAGOGASTRODUODENOSCOPY) (Left)  SIGMOIDOSCOPY, FLEXIBLE (N/A)  BIOPSY    Patient location: GI    Anesthesia Type: general    Transport from OR: Transported from OR on room air with adequate spontaneous ventilation    Post pain: adequate analgesia    Post assessment: no apparent anesthetic complications and tolerated procedure well    Post vital signs: stable    Level of consciousness: awake    Nausea/Vomiting: no nausea/vomiting    Complications: none    Transfer of care protocol was followed

## 2022-12-15 NOTE — PROVATION PATIENT INSTRUCTIONS
Discharge Summary/Instructions after an Endoscopic Procedure  Patient Name: Saji Gutierrez  Patient MRN: [id number]  Patient YOB: 2000  Thursday, December 15, 2022  Eleonora Philip MD  Dear patient,  As a result of recent federal legislation (The Federal Cures Act), you may   receive lab or pathology results from your procedure in your [facility]   account before your physician is able to contact you. Your physician or   their representative will relay the results to you with their   recommendations at their soonest availability.  Thank you,  RESTRICTIONS:  During your procedure today, you received medications for sedation.  These   medications may affect your judgment, balance and coordination.  Therefore,   for 24 hours, you have the following restrictions:   - DO NOT drive a car, operate machinery, make legal/financial decisions,   sign important papers or drink alcohol.    ACTIVITY:  Today: no heavy lifting, straining or running due to procedural   sedation/anesthesia.  The following day: return to full activity including work.  DIET:  Eat and drink normally unless instructed otherwise.     TREATMENT FOR COMMON SIDE EFFECTS:  - Mild abdominal pain, nausea, belching, bloating or excessive gas:  rest,   eat lightly and use a heating pad.  - Sore Throat: treat with throat lozenges and/or gargle with warm salt   water.  - Because air was used during the procedure, expelling large amounts of air   from your rectum or belching is normal.  - If a bowel prep was taken, you may not have a bowel movement for 1-3 days.    This is normal.  SYMPTOMS TO WATCH FOR AND REPORT TO YOUR PHYSICIAN:  1. Abdominal pain or bloating, other than gas cramps.  2. Chest pain.  3. Back pain.  4. Signs of infection such as: chills or fever occurring within 24 hours   after the procedure.  5. Rectal bleeding, which would show as bright red, maroon, or black stools.   (A tablespoon of blood from the rectum is not serious,  especially if   hemorrhoids are present.)  6. Vomiting.  7. Weakness or dizziness.  GO DIRECTLY TO THE NEAREST EMERGENCY ROOM IF YOU HAVE ANY OF THE FOLLOWING:      Difficulty breathing              Chills and/or fever over 101 F   Persistent vomiting and/or vomiting blood   Severe abdominal pain   Severe chest pain   Black, tarry stools   Bleeding- more than one tablespoon   Any other symptom or condition that you feel may need urgent attention  Your doctor recommends these additional instructions:  If any biopsies were taken, your doctors clinic will contact you in 1 to 2   weeks with any results.  - Return patient to [facility] for ongoing care.   - Advance diet as tolerated today.   - Continue present medications.  For questions, problems or results please call your physician - Eleonora Philip MD at .  [facility] , EMERGENCY ROOM PHONE NUMBER: [phone]  IF A COMPLICATION OR EMERGENCY SITUATION ARISES AND YOU ARE UNABLE TO REACH   YOUR PHYSICIAN - GO DIRECTLY TO THE EMERGENCY ROOM.  MD Eleonora Paulino MD  12/15/2022 5:36:10 PM  This report has been verified and signed electronically.  Dear patient,  As a result of recent federal legislation (The Federal Cures Act), you may   receive lab or pathology results from your procedure in your [facility]   account before your physician is able to contact you. Your physician or   their representative will relay the results to you with their   recommendations at their soonest availability.  Thank you,  PROVATION

## 2022-12-15 NOTE — ANESTHESIA PREPROCEDURE EVALUATION
12/15/2022  Saji Gutierrez is a 22 y.o., male with PMHx of smoking, GERD, anxiety presents for EGD/colonoscopy secondary to ulcerative colitis.    NO BETA BLOCKER USE    Active Ambulatory Problems     Diagnosis Date Noted    Ulcerative colitis with complication 04/05/2022    Iron deficiency anemia due to chronic blood loss 07/26/2022     Resolved Ambulatory Problems     Diagnosis Date Noted    No Resolved Ambulatory Problems     Past Medical History:   Diagnosis Date    Acid reflux     ADD (attention deficit disorder)     Anxiety     Ulcerative colitis          Pre-op Assessment    I have reviewed the NPO Status.      Review of Systems  Anesthesia Hx:  No problems with previous Anesthesia    Social:  Smoker    Cardiovascular:  Cardiovascular Normal     Pulmonary:  Pulmonary Normal    Renal/:  Renal/ Normal     Hepatic/GI:   GERD, well controlled    Neurological:  Neurology Normal    Endocrine:  Endocrine Normal    Psych:   anxiety        Vitals:    12/15/22 0016 12/15/22 0617 12/15/22 0735 12/15/22 1131   BP: 120/64 107/64 107/66    Pulse: 75 77 77    Resp: 20      Temp: 36.9 °C (98.5 °F) 36.8 °C (98.2 °F) 36.8 °C (98.2 °F)    TempSrc: Oral Oral     SpO2: 98% 99%  99%   Weight:       Height:             Physical Exam  General: Alert, Cooperative and Well nourished    Airway:  Mallampati: II   Mouth Opening: Normal  TM Distance: Normal  Tongue: Normal  Neck ROM: Normal ROM    Dental:  Intact    Chest/Lungs:  Clear to auscultation, Normal Respiratory Rate    Heart:  Rate: Normal  Rhythm: Regular Rhythm  Sounds: Normal      Lab Results   Component Value Date    WBC 11.1 12/15/2022    HGB 8.3 (L) 12/15/2022    HCT 27.0 (L) 12/15/2022    MCV 72.8 (L) 12/15/2022     (H) 12/15/2022       CMP  Sodium Level   Date Value Ref Range Status   12/15/2022 138 136 - 145 mmol/L Final     Potassium Level    Date Value Ref Range Status   12/15/2022 3.8 3.5 - 5.1 mmol/L Final     Carbon Dioxide   Date Value Ref Range Status   12/15/2022 22 22 - 29 mmol/L Final     Blood Urea Nitrogen   Date Value Ref Range Status   12/15/2022 5.2 (L) 8.9 - 20.6 mg/dL Final     Creatinine   Date Value Ref Range Status   12/15/2022 0.76 0.73 - 1.18 mg/dL Final     Calcium Level Total   Date Value Ref Range Status   12/15/2022 8.5 8.4 - 10.2 mg/dL Final     Albumin Level   Date Value Ref Range Status   12/15/2022 2.5 (L) 3.5 - 5.0 g/dL Final     Bilirubin Total   Date Value Ref Range Status   12/15/2022 0.3 <=1.5 mg/dL Final     Alkaline Phosphatase   Date Value Ref Range Status   12/15/2022 82 40 - 150 unit/L Final     Aspartate Aminotransferase   Date Value Ref Range Status   12/15/2022 12 5 - 34 unit/L Final     Alanine Aminotransferase   Date Value Ref Range Status   12/15/2022 7 0 - 55 unit/L Final     eGFR   Date Value Ref Range Status   12/15/2022 >60 mls/min/1.73/m2 Final           Anesthesia Plan  Type of Anesthesia, risks & benefits discussed:    Anesthesia Type: Gen Natural Airway  Intra-op Monitoring Plan: Standard ASA Monitors  Post Op Pain Control Plan: IV/PO Opioids PRN  Induction:  IV  Informed Consent: Informed consent signed with the Patient and all parties understand the risks and agree with anesthesia plan.  All questions answered.   ASA Score: 2  Day of Surgery Review of History & Physical: H&P Update referred to the surgeon/provider.    Ready For Surgery From Anesthesia Perspective.     .

## 2022-12-15 NOTE — PROGRESS NOTES
"Gastroenterology/Hepatology Progress Note    Patient Name: Saji Gutierrez  Age: 22 y.o.  : 2000  MRN: [id number]  Admission Date: 2022      Self, Aaareferral    SUBJECTIVE:      He has had one loose, non-bloody, brown stool today. He reports having a "faint" lower abdominal pain that comes and goes. He denies nausea and vomiting.     Review of patient's allergies indicates:  No Known Allergies       INPATIENT MEDICATIONS    Scheduled Meds:   enoxaparin  40 mg Subcutaneous Daily    pantoprazole  40 mg Oral Daily    sodium phosphates  2 enema Rectal Once     Continuous Infusions:   sodium chloride 0.9% 125 mL/hr at 22 0903     PRN Meds:  sodium chloride, sodium chloride, acetaminophen, dextrose 10%, dextrose 10%, glucagon (human recombinant), glucose, glucose, naloxone, sodium chloride 0.9%          Review of Systems:       Review of Systems   Constitutional:  Positive for appetite change, fatigue and unexpected weight change. Negative for chills and fever.   HENT:  Negative for trouble swallowing.    Respiratory: Negative.     Cardiovascular: Negative.    Gastrointestinal:  Positive for abdominal pain, blood in stool and diarrhea. Negative for abdominal distention, anal bleeding, constipation, nausea, rectal pain, vomiting, reflux and fecal incontinence.   Genitourinary:  Negative for dysuria and hematuria.   Musculoskeletal: Negative.    Integumentary:  Positive for pallor.   Neurological:  Negative for weakness.        Objective:       VITAL SIGNS: 24 HR MIN & MAX LAST    Temp  Min: 36.6 °F (2.6 °C)  Max: 99.4 °F (37.4 °C)  98.2 °F (36.8 °C)        BP  Min: 107/66  Max: 122/72  107/66     Pulse  Min: 75  Max: 90  77     Resp  Min: 16  Max: 20  20    SpO2  Min: 97 %  Max: 100 %  99 %        Physical Exam  Constitutional:       General: He is awake. He is not in acute distress.  Eyes:      General: No scleral icterus.     Conjunctiva/sclera: Conjunctivae normal.   Cardiovascular:      Rate and " Rhythm: Normal rate and regular rhythm.      Pulses: Normal pulses.      Heart sounds: Normal heart sounds.   Pulmonary:      Effort: Pulmonary effort is normal. No respiratory distress.      Breath sounds: Normal breath sounds.   Abdominal:      General: Abdomen is flat. Bowel sounds are normal. There is no distension.      Palpations: Abdomen is soft.      Tenderness: There is no abdominal tenderness.   Musculoskeletal:      Right lower leg: No edema.      Left lower leg: No edema.   Skin:     Coloration: Skin is pale. Skin is not jaundiced.   Neurological:      General: No focal deficit present.      Mental Status: He is alert and oriented to person, place, and time.   Psychiatric:         Mood and Affect: Mood normal.         Behavior: Behavior normal. Behavior is cooperative.            LABS:    Recent Labs   Lab 12/13/22  1800 12/14/22  0705 12/14/22  1449 12/15/22  0406   WBC 10.7 11.5 9.4 11.1   HGB 6.1* 7.5* 7.4* 8.3*   HCT 22.0* 25.2* 25.2* 27.0*   * 552* 518* 508*   MCV 66.5* 69.0* 69.8* 72.8*       Recent Labs   Lab 12/13/22  1800 12/14/22  0705 12/14/22  1449 12/15/22  0406   HGB 6.1* 7.5* 7.4* 8.3*   HCT 22.0* 25.2* 25.2* 27.0*        Recent Labs   Lab 12/13/22  1800 12/14/22  0705 12/15/22  0406    138 138   K 4.2 3.6 3.8   CO2 23 21* 22   BUN 6.2* 6.4* 5.2*   CREATININE 0.80 0.80 0.76   BILITOT 0.3 0.4 0.3   ALKPHOS 85 76 82   AST 11 10 12   ALT 9 6 7   LABPROT 6.3* 6.0* 5.9*   ALBUMIN 2.9* 2.6* 2.5*        No results for input(s): INR, PROTIME, PTT in the last 168 hours.     Recent Labs   Lab 12/13/22 2050   IRON 11*   FERRITIN 8.52*            IMAGING:   X-Ray Chest PA And Lateral    Result Date: 12/13/2022  EXAMINATION: XR CHEST PA AND LATERAL CLINICAL HISTORY: shortness of breath; TECHNIQUE: PA and lateral views of the chest were performed. COMPARISON: None FINDINGS: The lungs are clear, with normal appearance of pulmonary vasculature and no pleural effusion or pneumothorax. The  cardiac silhouette is normal in size. The hilar and mediastinal contours are unremarkable. Bones are intact.     No acute abnormality. Electronically signed by: Marino Gatica Date:    12/13/2022 Time:    18:53        Assessment / Plan:     Ulcerative colitis:  - Encouraged continued compliance with Uceris foam BID  - Continue Entyvio as outpatient  - Original plan to repeat flex sig after 6 weeks on Uceris foam BID to assess for response to rectal inflammation.  We discussed option to repeat flex sig now.  If flex sig showed persistent rectal disease with proximal healing, plan may remain the same in regards to continued topical rectal therapy while continuing Entyvio. Plan flex sig today.   -NPO for procedure.   -Fleets enemas x 2 for prep for procedure.   - fecal calprotectin in process. Crp 6.9     Iron deficiency anemia:  -Hgb on admit 6.1-->2 units PRBCs-->7.5-->7.4--> 1 unit PRBCs-->8.3  -Transfuse for Hgb <7.   -Continue iron supplement. Originally unable to tolerate oral iron.  If can tolerate, then ferrous sulfate 325 mg daily recommended  - Plan for EGD today given recent diclofenac use to rule out any upper issues.  -NPO for procedure.

## 2022-12-16 VITALS
HEIGHT: 73 IN | DIASTOLIC BLOOD PRESSURE: 62 MMHG | HEART RATE: 64 BPM | SYSTOLIC BLOOD PRESSURE: 119 MMHG | BODY MASS INDEX: 24.54 KG/M2 | OXYGEN SATURATION: 98 % | WEIGHT: 185.19 LBS | TEMPERATURE: 98 F | RESPIRATION RATE: 18 BRPM

## 2022-12-16 LAB
ABS NEUT CALC (OHS): 15.14 X10(3)/MCL (ref 2.1–9.2)
ALBUMIN SERPL-MCNC: 2.9 G/DL (ref 3.5–5)
ALBUMIN/GLOB SERPL: 0.7 RATIO (ref 1.1–2)
ALP SERPL-CCNC: 102 UNIT/L (ref 40–150)
ALT SERPL-CCNC: 9 UNIT/L (ref 0–55)
ANISOCYTOSIS BLD QL SMEAR: ABNORMAL
AST SERPL-CCNC: 11 UNIT/L (ref 5–34)
BASOPHILS NFR BLD MANUAL: 0.18 X10(3)/MCL (ref 0–0.2)
BASOPHILS NFR BLD MANUAL: 1 % (ref 0–2)
BILIRUBIN DIRECT+TOT PNL SERPL-MCNC: 0.3 MG/DL
BUN SERPL-MCNC: 6 MG/DL (ref 8.9–20.6)
CALCIUM SERPL-MCNC: 9 MG/DL (ref 8.4–10.2)
CHLORIDE SERPL-SCNC: 107 MMOL/L (ref 98–107)
CO2 SERPL-SCNC: 24 MMOL/L (ref 22–29)
CREAT SERPL-MCNC: 0.73 MG/DL (ref 0.73–1.18)
EOSINOPHIL NFR BLD MANUAL: 0.35 X10(3)/MCL (ref 0–0.9)
EOSINOPHIL NFR BLD MANUAL: 2 % (ref 0–8)
ERYTHROCYTE [DISTWIDTH] IN BLOOD BY AUTOMATED COUNT: 24.3 % (ref 11.6–14.4)
FOLATE SERPL-MCNC: 7.3 NG/ML (ref 7–31.4)
GFR SERPLBLD CREATININE-BSD FMLA CKD-EPI: >60 MLS/MIN/1.73/M2
GLOBULIN SER-MCNC: 4 GM/DL (ref 2.4–3.5)
GLUCOSE SERPL-MCNC: 124 MG/DL (ref 74–100)
HCT VFR BLD AUTO: 31 % (ref 42–52)
HGB BLD-MCNC: 9.1 GM/DL (ref 14–18)
IMM GRANULOCYTES # BLD AUTO: 2.44 X10(3)/MCL (ref 0–0.04)
IMM GRANULOCYTES NFR BLD AUTO: 13.9 %
LYMPHOCYTES NFR BLD MANUAL: 1.41 X10(3)/MCL
LYMPHOCYTES NFR BLD MANUAL: 8 % (ref 13–40)
MCH RBC QN AUTO: 21.9 PG
MCHC RBC AUTO-ENTMCNC: 29.4 MG/DL (ref 33–36)
MCV RBC AUTO: 74.7 FL (ref 80–94)
METAMYELOCYTES NFR BLD MANUAL: 2 %
MONOCYTES NFR BLD MANUAL: 0.53 X10(3)/MCL (ref 0.1–1.3)
MONOCYTES NFR BLD MANUAL: 3 % (ref 2–11)
MYELOCYTES NFR BLD MANUAL: 3 %
NEUTROPHILS NFR BLD MANUAL: 77 % (ref 47–80)
NEUTS BAND NFR BLD MANUAL: 4 % (ref 0–11)
NRBC BLD AUTO-RTO: 1.1 % (ref 0–1)
NRBC BLD MANUAL-RTO: 3 %
PLATELET # BLD AUTO: 634 X10(3)/MCL (ref 140–371)
PLATELET # BLD EST: ABNORMAL 10*3/UL
PMV BLD AUTO: 9.4 FL (ref 9.4–12.4)
POIKILOCYTOSIS BLD QL SMEAR: ABNORMAL
POLYCHROMASIA BLD QL SMEAR: ABNORMAL
POTASSIUM SERPL-SCNC: 3.6 MMOL/L (ref 3.5–5.1)
PROT SERPL-MCNC: 6.9 GM/DL (ref 6.4–8.3)
RBC # BLD AUTO: 4.15 X10(6)/MCL (ref 4.7–6.1)
RBC MORPH BLD: ABNORMAL
SCHISTOCYTE (OLG): ABNORMAL
SODIUM SERPL-SCNC: 139 MMOL/L (ref 136–145)
WBC # SPEC AUTO: 17.6 X10(3)/MCL (ref 4.5–11.5)

## 2022-12-16 PROCEDURE — G0378 HOSPITAL OBSERVATION PER HR: HCPCS

## 2022-12-16 PROCEDURE — 94761 N-INVAS EAR/PLS OXIMETRY MLT: CPT

## 2022-12-16 PROCEDURE — 25000003 PHARM REV CODE 250: Performed by: INTERNAL MEDICINE

## 2022-12-16 PROCEDURE — 80053 COMPREHEN METABOLIC PANEL: CPT

## 2022-12-16 PROCEDURE — 63600175 PHARM REV CODE 636 W HCPCS: Performed by: INTERNAL MEDICINE

## 2022-12-16 PROCEDURE — 85007 BL SMEAR W/DIFF WBC COUNT: CPT

## 2022-12-16 PROCEDURE — 96376 TX/PRO/DX INJ SAME DRUG ADON: CPT

## 2022-12-16 PROCEDURE — 82746 ASSAY OF FOLIC ACID SERUM: CPT | Performed by: STUDENT IN AN ORGANIZED HEALTH CARE EDUCATION/TRAINING PROGRAM

## 2022-12-16 PROCEDURE — 96366 THER/PROPH/DIAG IV INF ADDON: CPT

## 2022-12-16 PROCEDURE — 25000003 PHARM REV CODE 250: Performed by: STUDENT IN AN ORGANIZED HEALTH CARE EDUCATION/TRAINING PROGRAM

## 2022-12-16 PROCEDURE — 36415 COLL VENOUS BLD VENIPUNCTURE: CPT

## 2022-12-16 RX ORDER — FOLIC ACID 1 MG/1
1 TABLET ORAL DAILY
Qty: 30 TABLET | Refills: 3 | Status: SHIPPED | OUTPATIENT
Start: 2022-12-16 | End: 2022-12-21 | Stop reason: SDUPTHER

## 2022-12-16 RX ADMIN — ESCITALOPRAM OXALATE 10 MG: 10 TABLET ORAL at 09:12

## 2022-12-16 RX ADMIN — PANTOPRAZOLE SODIUM 40 MG: 40 TABLET, DELAYED RELEASE ORAL at 09:12

## 2022-12-16 RX ADMIN — SODIUM CHLORIDE 125 MG: 9 INJECTION, SOLUTION INTRAVENOUS at 09:12

## 2022-12-16 RX ADMIN — METHYLPREDNISOLONE SODIUM SUCCINATE 20 MG: 40 INJECTION, POWDER, FOR SOLUTION INTRAMUSCULAR; INTRAVENOUS at 09:12

## 2022-12-16 NOTE — PROGRESS NOTES
Gastroenterology/Hepatology Progress Note    Patient Name: Saji Gutierrez  Age: 22 y.o.  : 2000  MRN: [id number]  Admission Date: 2022      Self, Aaareferral    SUBJECTIVE:      Patient reports feeling better today. He denies abdominal pain, nausea and vomiting. He had one loose, brown stool this morning with no blood seen. He is eating well.     Review of patient's allergies indicates:  No Known Allergies       INPATIENT MEDICATIONS    Scheduled Meds:   enoxaparin  40 mg Subcutaneous Daily    EScitalopram oxalate  10 mg Oral Daily    LIDOcaine (PF) 10 mg/ml (1%)  1 mL Intradermal Once    methylPREDNISolone sodium succinate injection  20 mg Intravenous TID    pantoprazole  40 mg Oral Daily     Continuous Infusions:   sodium chloride 0.9% 125 mL/hr at 22 0903    lactated ringers 10 mL/hr at 12/15/22 1304     PRN Meds:  sodium chloride, sodium chloride, acetaminophen, dextrose 10%, dextrose 10%, glucagon (human recombinant), glucose, glucose, naloxone, sodium chloride 0.9%          Review of Systems:       Review of Systems   Constitutional:  Negative for chills, fatigue and fever.   HENT:  Negative for trouble swallowing.    Respiratory: Negative.     Cardiovascular: Negative.    Gastrointestinal:  Positive for diarrhea. Negative for abdominal distention, abdominal pain, anal bleeding, blood in stool, constipation, nausea, rectal pain, vomiting, reflux and fecal incontinence.   Genitourinary:  Negative for dysuria and hematuria.   Musculoskeletal: Negative.    Integumentary:  Positive for pallor (improved).   Neurological:  Negative for weakness.        Objective:       VITAL SIGNS: 24 HR MIN & MAX LAST    Temp  Min: 98.2 °F (36.8 °C)  Max: 99.5 °F (37.5 °C)  98.3 °F (36.8 °C)        BP  Min: 111/65  Max: 122/64  119/62     Pulse  Min: 64  Max: 87  64     Resp  Min: 18  Max: 18  18    SpO2  Min: 97 %  Max: 100 %  98 %        Physical Exam  Constitutional:       General: He is awake. He is not in  acute distress.  Eyes:      General: No scleral icterus.     Conjunctiva/sclera: Conjunctivae normal.   Cardiovascular:      Rate and Rhythm: Normal rate and regular rhythm.      Pulses: Normal pulses.      Heart sounds: Normal heart sounds.   Pulmonary:      Effort: Pulmonary effort is normal. No respiratory distress.      Breath sounds: Normal breath sounds.   Abdominal:      General: Abdomen is flat. Bowel sounds are normal. There is no distension.      Palpations: Abdomen is soft.      Tenderness: There is no abdominal tenderness.   Musculoskeletal:      Right lower leg: No edema.      Left lower leg: No edema.   Skin:     Coloration: Skin is pale (improved from previous exam). Skin is not jaundiced.   Neurological:      General: No focal deficit present.      Mental Status: He is alert and oriented to person, place, and time.   Psychiatric:         Mood and Affect: Mood normal.         Behavior: Behavior normal. Behavior is cooperative.            LABS:    Recent Labs   Lab 12/13/22  1800 12/14/22  0705 12/14/22  1449 12/15/22  0406 12/16/22  0349   WBC 10.7 11.5 9.4 11.1 17.6*   HGB 6.1* 7.5* 7.4* 8.3* 9.1*   HCT 22.0* 25.2* 25.2* 27.0* 31.0*   * 552* 518* 508* 634*   MCV 66.5* 69.0* 69.8* 72.8* 74.7*       Recent Labs   Lab 12/13/22  1800 12/14/22  0705 12/14/22  1449 12/15/22  0406 12/16/22  0349   HGB 6.1* 7.5* 7.4* 8.3* 9.1*   HCT 22.0* 25.2* 25.2* 27.0* 31.0*        Recent Labs   Lab 12/13/22  1800 12/14/22  0705 12/15/22  0406 12/16/22  0349    138 138 139   K 4.2 3.6 3.8 3.6   CO2 23 21* 22 24   BUN 6.2* 6.4* 5.2* 6.0*   CREATININE 0.80 0.80 0.76 0.73   BILITOT 0.3 0.4 0.3 0.3   ALKPHOS 85 76 82 102   AST 11 10 12 11   ALT 9 6 7 9   LABPROT 6.3* 6.0* 5.9* 6.9   ALBUMIN 2.9* 2.6* 2.5* 2.9*        No results for input(s): INR, PROTIME, PTT in the last 168 hours.     Recent Labs   Lab 12/13/22 2050   IRON 11*   FERRITIN 8.52*            IMAGING:   X-Ray Chest PA And Lateral    Result Date:  12/13/2022  EXAMINATION: XR CHEST PA AND LATERAL CLINICAL HISTORY: shortness of breath; TECHNIQUE: PA and lateral views of the chest were performed. COMPARISON: None FINDINGS: The lungs are clear, with normal appearance of pulmonary vasculature and no pleural effusion or pneumothorax. The cardiac silhouette is normal in size. The hilar and mediastinal contours are unremarkable. Bones are intact.     No acute abnormality. Electronically signed by: Marino Gatica Date:    12/13/2022 Time:    18:53        Assessment / Plan:     Ulcerative colitis:  - Encouraged continued compliance with Uceris foam BID  - Stop Entyvio due to loss of response. Will attempt to get approval for upadacitinib for UC treatment to start as outpatient. Will start therapy once approved by insurance.   - Discharge home on prednisone taper. Prescription has been sent to patient's pharmacy.   -Okay to discharge home from GI standpoint.   -Outpatient GI Clinic follow-up will be arranged once treatment has been approved.        Iron deficiency anemia:  -Hgb on admit 6.1-->2 units PRBCs-->7.5-->7.4--> 1 unit PRBCs-->8.3-->9.1  -Continue IV iron transfusions outpatient as scheduled.

## 2022-12-16 NOTE — PROGRESS NOTES
Inpatient Nutrition Assessment    Admit Date: 12/13/2022   Total duration of encounter: 3 days     Nutrition Recommendation/Prescription     Continue regular diet as tolerated   Continue vanilla boost plus tid --360kcal, 14 gm protein per serving  MVI/fe  Biweekly wt  Suggest megace to stimulate appetite   Will monitor nutrition status     Communication of Recommendations: reviewed with patient/caregiver and reviewed with nurse    Nutrition Assessment     Malnutrition Assessment/Nutrition-Focused Physical Exam    Malnutrition in the context of chronic illness  Degree of Malnutrition: severe malnutrition  Energy Intake: </= 75% of estimated energy requirement for >/= 1 month  Interpretation of Weight Loss: >10% in 6 months  Body Fat: mild depletion  Area of Body Fat Loss: orbital region  and upper arm region - triceps / biceps  Muscle Mass Loss: mild depletion  Area of Muscle Mass Loss: clavicle bone region - pectoralis major, deltoid, trapezius muscles and clavicle and acromion bone region - deltoid muscle  Fluid Accumulation: does not meet criteria  Edema: no edema present  Reduced  Strength: unable to obtain  A minimum of two characteristics is recommended for diagnosis of either severe or non-severe malnutrition.    Chart Review    Reason Seen: continuous nutrition monitoring, malnutrition screening tool, and follow-up    Diagnosis:  Anemia 2 ulcerative colitis, anxiety ; (12/15) S/P EGD, sigmoidoscopy    Relevant Medical History: ulcerative colitis, anxiety     Nutrition-Related Medications:  pantoprazole ; Fe Gluconate, methylprednisolone   Calorie Containing IV Medications: no significant kcals from medications at this time    Nutrition-Related Labs:  (12-14) H/H  7.5/25.2(L) Gluc 92 Bun 6.4 Cr 0.8 K 3.6   (12-16) H/H 9.1/31.0(L) Gluc 124(H) Cr 0.7 K 3.6     Diet/PN Order: Diet Adult Regular  Oral Supplement Order: Boost Plus  Tube Feeding Order: none  Appetite/Oral Intake: fair/50-75% of meals  Factors  "Affecting Nutritional Intake: abdominal pain and decreased appetite  Food/Confucianist/Cultural Preferences:  vanilla boost   Food Allergies: none reported       Wound(s):   none    Comments  (-) Pt reported he is feeling better; appetite improving --eating 50-75% meals; drinking supplement; no new wt. Possible discharge today. Current diet tx appropriate.     () Pt reported not eating well for sometime; poor appetite; eating 50% or less of meals; + progressive wt loss--approx 11% over 6 months. Mild fat/muscle wasting. Pt willing to try oral supplement. Suggest megace to stimulate appetite.     Anthropometrics    Height: 6' 1" (185.4 cm) Height Method: Stated  Last Weight: 84 kg (185 lb 3 oz) (22) Weight Method: Standard Scale  BMI (Calculated): 24.4  BMI Classification: normal (BMI 18.5-24.9)        Ideal Body Weight (IBW), Male: 184 lb     % Ideal Body Weight, Male (lb): 100.65 %                 Usual Body Weight (UBW), k.3 kg (12% wt loss over 6 months)  % Usual Body Weight: 88.33     Usual Weight Provided By: patient, family/caregiver, and EMR weight history    Wt Readings from Last 5 Encounters:   22 84 kg (185 lb 3 oz)   22 86.7 kg (191 lb 2.2 oz)   11/15/22 91.8 kg (202 lb 6.4 oz)   22 92 kg (202 lb 13.2 oz)   10/02/22 98 kg (215 lb 15.1 oz)     Weight Change(s) Since Admission:  Admit Weight: 84 kg (185 lb 3 oz) (22 5485)  Pt reported 11% wt loss over 6 months     Estimated Needs    Weight Used For Calorie Calculations: 84 kg (185 lb 3 oz)  Energy Calorie Requirements (kcal): 2520 kcal/d; 30 casey/kg  Energy Need Method: Kcal/kg  Weight Used For Protein Calculations: 84 kg (185 lb 3 oz)  Protein Requirements: 126 gm prot/d; 1.5 gm/kg  Fluid Requirements (mL): 2520 ml/d; 1ml/casey  Temp: 98.3 °F (36.8 °C)       Enteral Nutrition    Patient not receiving enteral nutrition at this time.    Parenteral Nutrition    Patient not receiving parenteral nutrition support at " this time.    Evaluation of Received Nutrient Intake    Calories: meeting estimated needs  Protein: meeting estimated needs    Patient Education    Not applicable.    Nutrition Diagnosis     PES: Malnutrition related to ulcerative colitis  as evidenced by po intake < 50% meals; 11% wt loss 6 months; fat/muscle wasting . (improving)    Interventions/Goals     Intervention(s): modified composition of meals/snacks, commercial beverage, multivitamin/mineral supplement therapy, and collaboration with other providers  Goal: Meet greater than 75% of nutritional needs by follow-up. (goal progressing)    Monitoring & Evaluation     Dietitian will monitor food and beverage intake and weight.  Nutrition Risk/Follow-Up: high (follow-up in 1-4 days)   Please consult if re-assessment needed sooner.

## 2022-12-16 NOTE — DISCHARGE SUMMARY
[facility] Discharge Summary    Admitting Physician: Em Edge MD  Attending Physician: Em Edge MD  Date of Admit: 12/13/2022  Date of Discharge: 12/16/2022    Condition: Stable  Outcome: Condition has improved and patient is now ready for discharge.  DISPOSITION: Home or Self Care          Discharge Diagnoses     Problem List Items Addressed This Visit          Oncology    Iron deficiency anemia due to chronic blood loss    Relevant Orders    Case Request Endoscopy: EGD (ESOPHAGOGASTRODUODENOSCOPY) (Completed)    Interventional Radiology (Completed)    Specimen to Pathology    * (Principal) Symptomatic anemia - Primary    Relevant Orders    EKG 12-lead (Completed)    EKG 12-lead (Completed)    EKG 12-lead (Completed)    Vital Signs    Bladder scan    Notify Physician    Insert saline lock    Place sequential compression device    Recheck Blood Glucose:    EKG 12-lead    Full code    Admit to Inpatient (Completed)    Iron and TIBC (Completed)    Ferritin (Completed)    Inpatient consult to Gastroenterology (Completed)    CBC Auto Differential (Completed)    Blood Smear Microscopic Exam (Completed)    CBC with Automated Differential    Comprehensive Metabolic Panel (CMP)       Principal Problem:    Symptomatic anemia    Consultants and Procedures     Consultants:  IP CONSULT TO INTERNAL MEDICINE  IP CONSULT TO GI    Procedures:   Procedure(s) (LRB):  EGD (ESOPHAGOGASTRODUODENOSCOPY) (Left)  SIGMOIDOSCOPY, FLEXIBLE (N/A)  BIOPSY     Brief Admission History    Saji Gutierrez is a 22 y.o. male with PMH significant for UC (diagnosed in 2020), ADD and anxiety presented to ED on 12/13/2022  with a primary complaint of weakness. Pt began feeling weak, dizzy and having shortness of breath on Sunday (12/11/22). Symptoms began to progressively worsen until this afternoon. Admits to bilateral lower quadrant abdominal pain. 5-7 loose stools per day. Denies blood in stool but does have bright red blood on  "toilet paper when he wipes. Admits to recent weight loss; weighed 230 about 3 months ago (September 2022) now down to 185 per weighed recorded 12/13/22. Decreased appetite; last meal was yesterday. Frequently uses hits of marijuana to help with pain control and increased appetitie; unable to quantify amount of times used in the last month.    Hospital Course with Pertinent Findings   Pt is a 22 year old male with a hx of UC, ADD, and anxiety admitted for symptomatic anemia secondary to UC. On admission, had H/H of 6.1/22, transfused a total of 3U of blood this admission. Was put on iron infusion after iron panel reveal iron 11, ferritin 8.52, iron sat 4, TIBC 247, previously receiving  iron transfusion outpatient ( last transfused 11/15/22). EGD was grossly normal and and flex sig with active disease to the transverse colon. Pt was started on steroids post-procedures and reports significant improvement in symptoms (no more abdominal tenderness and bloody stools). Upon discharge, pt was stable with normal range vital signs.    Discharge physical exam:  Vitals  BP: 119/62  Temp: 98.3 °F (36.8 °C)  Temp src: Oral  Pulse: 64  Resp: 18  SpO2: 98 %  Height: 6' 1" (185.4 cm)  Weight: 84 kg (185 lb 3 oz)    Physical Exam  Vitals and nursing note reviewed.   Constitutional:       General: He is not in acute distress.  HENT:      Head: Normocephalic and atraumatic.   Eyes:      Pupils: Pupils are equal, round, and reactive to light.   Cardiovascular:      Rate and Rhythm: Normal rate and regular rhythm.      Pulses: Normal pulses.      Heart sounds: Normal heart sounds. No murmur heard.    No friction rub. No gallop.   Pulmonary:      Effort: Pulmonary effort is normal. No respiratory distress.      Breath sounds: Normal breath sounds. No wheezing or rales.   Abdominal:      General: Abdomen is flat. Bowel sounds are normal. There is no distension.      Palpations: Abdomen is soft.      Tenderness: There is no abdominal " tenderness. There is no guarding or rebound.   Musculoskeletal:      Cervical back: Neck supple.      Right lower leg: No edema.      Left lower leg: No edema.   Skin:     General: Skin is warm.      Capillary Refill: Capillary refill takes less than 2 seconds.   Neurological:      General: No focal deficit present.      Mental Status: He is alert and oriented to person, place, and time.         TIME SPENT ON DISCHARGE: 60 minutes    Discharge Medications        Medication List        START taking these medications      folic acid 1 MG tablet  Commonly known as: FOLVITE  Take 1 tablet (1 mg total) by mouth once daily.     predniSONE 10 MG tablet  Commonly known as: DELTASONE  Take 4 tablets (40 mg total) by mouth once daily for 14 days, THEN 3 tablets (30 mg total) once daily for 14 days, THEN 2 tablets (20 mg total) once daily for 7 days, THEN 1.5 tablets (15 mg total) once daily for 7 days, THEN 1 tablet (10 mg total) once daily for 7 days, THEN 0.5 tablets (5 mg total) once daily for 7 days.  Start taking on: December 15, 2022            CONTINUE taking these medications      budesonide 2 mg/actuation Foam  Commonly known as: UCERIS  Place 2 mg rectally 2 (two) times a day.     diclofenac 75 MG EC tablet  Commonly known as: VOLTAREN     EScitalopram oxalate 10 MG tablet  Commonly known as: LEXAPRO     multivitamin capsule     omeprazole 20 MG capsule  Commonly known as: PRILOSEC               Where to Get Your Medications        These medications were sent to [facility] [address] [address]      Phone: [phone]   folic acid 1 MG tablet  predniSONE 10 MG tablet         Discharge Information:   -will f/u with Dr Philip outpatient (appointment to be scheduled by GI once   upadacitinib for UC treatment has been approved by insurance per GI)  -Has IV Iron infusion appointment scheduled  -will f/u with PCP Dr Keshawn García MD on  12/22/22 at 9:20 am      Aristeo Clemons   [facility]-

## 2022-12-16 NOTE — NURSING
Patient without tele monitor on, asked patient about wearing tele monitor ,  pt stated 'i am not going to wear that' while shaking head no.

## 2022-12-17 LAB — CALPROTECTIN STL-MCNT: 1527 MCG/G

## 2022-12-19 ENCOUNTER — TELEPHONE (OUTPATIENT)
Dept: GASTROENTEROLOGY | Facility: CLINIC | Age: 22
End: 2022-12-19
Payer: MEDICAID

## 2022-12-19 NOTE — TELEPHONE ENCOUNTER
----- Message from Eleonora Philip MD sent at 12/16/2022  3:51 PM CST -----  Regarding: therapy  Please send approval for Rinvoq.  If not approved, then will send for infliximab instead.

## 2022-12-21 DIAGNOSIS — K51.019 ULCERATIVE PANCOLITIS WITH COMPLICATION: Primary | ICD-10-CM

## 2022-12-21 RX ORDER — FOLIC ACID 1 MG/1
1 TABLET ORAL DAILY
Qty: 30 TABLET | Refills: 11 | Status: SHIPPED | OUTPATIENT
Start: 2022-12-21 | End: 2023-12-21

## 2022-12-21 RX ORDER — METHOTREXATE 2.5 MG/1
15 TABLET ORAL
Qty: 24 TABLET | Refills: 11 | Status: SHIPPED | OUTPATIENT
Start: 2022-12-21 | End: 2023-12-21

## 2022-12-22 ENCOUNTER — INFUSION (OUTPATIENT)
Dept: INFUSION THERAPY | Facility: HOSPITAL | Age: 22
End: 2022-12-22
Attending: INTERNAL MEDICINE
Payer: MEDICAID

## 2022-12-22 VITALS — BODY MASS INDEX: 26.67 KG/M2 | HEIGHT: 73 IN | WEIGHT: 201.25 LBS

## 2022-12-22 DIAGNOSIS — D50.0 IRON DEFICIENCY ANEMIA DUE TO CHRONIC BLOOD LOSS: Primary | ICD-10-CM

## 2022-12-22 PROCEDURE — 63600175 PHARM REV CODE 636 W HCPCS: Mod: JG | Performed by: INTERNAL MEDICINE

## 2022-12-22 PROCEDURE — 25000003 PHARM REV CODE 250: Performed by: INTERNAL MEDICINE

## 2022-12-22 PROCEDURE — 96365 THER/PROPH/DIAG IV INF INIT: CPT

## 2022-12-22 RX ORDER — SODIUM CHLORIDE 0.9 % (FLUSH) 0.9 %
10 SYRINGE (ML) INJECTION
Status: CANCELLED | OUTPATIENT
Start: 2022-12-22

## 2022-12-22 RX ORDER — EPINEPHRINE 0.3 MG/.3ML
0.3 INJECTION SUBCUTANEOUS ONCE AS NEEDED
Status: CANCELLED | OUTPATIENT
Start: 2022-12-22

## 2022-12-22 RX ORDER — HEPARIN 100 UNIT/ML
500 SYRINGE INTRAVENOUS
Status: CANCELLED | OUTPATIENT
Start: 2022-12-22

## 2022-12-22 RX ORDER — SODIUM CHLORIDE 0.9 % (FLUSH) 0.9 %
10 SYRINGE (ML) INJECTION
Status: DISCONTINUED | OUTPATIENT
Start: 2022-12-22 | End: 2022-12-22 | Stop reason: HOSPADM

## 2022-12-22 RX ORDER — DIPHENHYDRAMINE HYDROCHLORIDE 50 MG/ML
50 INJECTION INTRAMUSCULAR; INTRAVENOUS ONCE AS NEEDED
Status: CANCELLED | OUTPATIENT
Start: 2022-12-22

## 2022-12-22 RX ORDER — METHYLPREDNISOLONE SOD SUCC 125 MG
125 VIAL (EA) INJECTION ONCE AS NEEDED
Status: CANCELLED | OUTPATIENT
Start: 2022-12-22

## 2022-12-22 RX ADMIN — FERUMOXYTOL 510 MG: 510 INJECTION INTRAVENOUS at 11:12

## 2022-12-27 LAB
DHEA SERPL-MCNC: NORMAL
ESTROGEN SERPL-MCNC: NORMAL PG/ML
INSULIN SERPL-ACNC: NORMAL U[IU]/ML
LAB AP CLINICAL INFORMATION: NORMAL
LAB AP GROSS DESCRIPTION: NORMAL
LAB AP REPORT FOOTNOTES: NORMAL
T3RU NFR SERPL: NORMAL %

## 2022-12-28 DIAGNOSIS — K51.019 ULCERATIVE PANCOLITIS WITH COMPLICATION: Primary | ICD-10-CM

## 2022-12-28 RX ORDER — ACETAMINOPHEN 325 MG/1
650 TABLET ORAL
Status: CANCELLED | OUTPATIENT
Start: 2022-12-28

## 2022-12-28 RX ORDER — EPINEPHRINE 0.3 MG/.3ML
0.3 INJECTION SUBCUTANEOUS ONCE AS NEEDED
Status: CANCELLED | OUTPATIENT
Start: 2022-12-28

## 2022-12-28 RX ORDER — DIPHENHYDRAMINE HYDROCHLORIDE 50 MG/ML
50 INJECTION INTRAMUSCULAR; INTRAVENOUS ONCE AS NEEDED
Status: CANCELLED | OUTPATIENT
Start: 2022-12-28

## 2022-12-28 RX ORDER — ACETAMINOPHEN 500 MG
500 TABLET ORAL
Status: CANCELLED | OUTPATIENT
Start: 2022-12-28

## 2022-12-28 RX ORDER — METHYLPREDNISOLONE SOD SUCC 125 MG
40 VIAL (EA) INJECTION
Status: CANCELLED | OUTPATIENT
Start: 2022-12-28

## 2022-12-28 RX ORDER — SODIUM CHLORIDE 0.9 % (FLUSH) 0.9 %
10 SYRINGE (ML) INJECTION
Status: CANCELLED | OUTPATIENT
Start: 2022-12-28

## 2022-12-28 RX ORDER — HEPARIN 100 UNIT/ML
500 SYRINGE INTRAVENOUS
Status: CANCELLED | OUTPATIENT
Start: 2022-12-28

## 2022-12-28 RX ORDER — IPRATROPIUM BROMIDE AND ALBUTEROL SULFATE 2.5; .5 MG/3ML; MG/3ML
3 SOLUTION RESPIRATORY (INHALATION)
Status: CANCELLED | OUTPATIENT
Start: 2022-12-28

## 2022-12-28 RX ORDER — DIPHENHYDRAMINE HYDROCHLORIDE 50 MG/ML
25 INJECTION INTRAMUSCULAR; INTRAVENOUS
Status: CANCELLED | OUTPATIENT
Start: 2022-12-28

## 2022-12-29 ENCOUNTER — INFUSION (OUTPATIENT)
Dept: INFUSION THERAPY | Facility: HOSPITAL | Age: 22
End: 2022-12-29
Attending: INTERNAL MEDICINE
Payer: MEDICAID

## 2022-12-29 ENCOUNTER — PATIENT MESSAGE (OUTPATIENT)
Dept: GASTROENTEROLOGY | Facility: CLINIC | Age: 22
End: 2022-12-29
Payer: MEDICAID

## 2022-12-29 VITALS
HEIGHT: 73 IN | HEART RATE: 88 BPM | RESPIRATION RATE: 20 BRPM | BODY MASS INDEX: 26.74 KG/M2 | WEIGHT: 201.75 LBS | SYSTOLIC BLOOD PRESSURE: 127 MMHG | DIASTOLIC BLOOD PRESSURE: 75 MMHG | TEMPERATURE: 98 F

## 2022-12-29 DIAGNOSIS — D50.0 IRON DEFICIENCY ANEMIA DUE TO CHRONIC BLOOD LOSS: Primary | ICD-10-CM

## 2022-12-29 PROCEDURE — 25000003 PHARM REV CODE 250: Performed by: INTERNAL MEDICINE

## 2022-12-29 PROCEDURE — A4216 STERILE WATER/SALINE, 10 ML: HCPCS | Performed by: INTERNAL MEDICINE

## 2022-12-29 PROCEDURE — 63600175 PHARM REV CODE 636 W HCPCS: Mod: JG | Performed by: INTERNAL MEDICINE

## 2022-12-29 RX ORDER — METHYLPREDNISOLONE SOD SUCC 125 MG
40 VIAL (EA) INJECTION
Status: CANCELLED | OUTPATIENT
Start: 2023-02-23

## 2022-12-29 RX ORDER — SODIUM CHLORIDE 0.9 % (FLUSH) 0.9 %
10 SYRINGE (ML) INJECTION
Status: CANCELLED | OUTPATIENT
Start: 2023-02-23

## 2022-12-29 RX ORDER — ACETAMINOPHEN 500 MG
500 TABLET ORAL
Status: CANCELLED | OUTPATIENT
Start: 2023-02-23

## 2022-12-29 RX ORDER — METHYLPREDNISOLONE SOD SUCC 125 MG
125 VIAL (EA) INJECTION ONCE AS NEEDED
Status: CANCELLED | OUTPATIENT
Start: 2022-12-29

## 2022-12-29 RX ORDER — HEPARIN 100 UNIT/ML
500 SYRINGE INTRAVENOUS
Status: CANCELLED | OUTPATIENT
Start: 2022-12-29

## 2022-12-29 RX ORDER — HEPARIN 100 UNIT/ML
500 SYRINGE INTRAVENOUS
Status: CANCELLED | OUTPATIENT
Start: 2023-02-23

## 2022-12-29 RX ORDER — SODIUM CHLORIDE 0.9 % (FLUSH) 0.9 %
10 SYRINGE (ML) INJECTION
Status: CANCELLED | OUTPATIENT
Start: 2022-12-29

## 2022-12-29 RX ORDER — EPINEPHRINE 0.3 MG/.3ML
0.3 INJECTION SUBCUTANEOUS ONCE AS NEEDED
Status: CANCELLED | OUTPATIENT
Start: 2022-12-29

## 2022-12-29 RX ORDER — IPRATROPIUM BROMIDE AND ALBUTEROL SULFATE 2.5; .5 MG/3ML; MG/3ML
3 SOLUTION RESPIRATORY (INHALATION)
Status: CANCELLED | OUTPATIENT
Start: 2023-02-23

## 2022-12-29 RX ORDER — DIPHENHYDRAMINE HYDROCHLORIDE 50 MG/ML
25 INJECTION INTRAMUSCULAR; INTRAVENOUS
Status: CANCELLED | OUTPATIENT
Start: 2023-02-23

## 2022-12-29 RX ORDER — EPINEPHRINE 0.3 MG/.3ML
0.3 INJECTION SUBCUTANEOUS ONCE AS NEEDED
Status: CANCELLED | OUTPATIENT
Start: 2023-02-23

## 2022-12-29 RX ORDER — ACETAMINOPHEN 325 MG/1
650 TABLET ORAL
Status: CANCELLED | OUTPATIENT
Start: 2023-02-23

## 2022-12-29 RX ORDER — SODIUM CHLORIDE 0.9 % (FLUSH) 0.9 %
10 SYRINGE (ML) INJECTION
Status: ACTIVE | OUTPATIENT
Start: 2022-12-29

## 2022-12-29 RX ORDER — DIPHENHYDRAMINE HYDROCHLORIDE 50 MG/ML
50 INJECTION INTRAMUSCULAR; INTRAVENOUS ONCE AS NEEDED
Status: CANCELLED | OUTPATIENT
Start: 2022-12-29

## 2022-12-29 RX ORDER — DIPHENHYDRAMINE HYDROCHLORIDE 50 MG/ML
50 INJECTION INTRAMUSCULAR; INTRAVENOUS ONCE AS NEEDED
Status: CANCELLED | OUTPATIENT
Start: 2023-02-23

## 2022-12-29 RX ADMIN — FERUMOXYTOL 510 MG: 510 INJECTION INTRAVENOUS at 02:12

## 2022-12-29 RX ADMIN — SODIUM CHLORIDE: 9 INJECTION, SOLUTION INTRAVENOUS at 01:12

## 2022-12-29 RX ADMIN — SODIUM CHLORIDE, PRESERVATIVE FREE 10 ML: 5 INJECTION INTRAVENOUS at 01:12

## 2023-01-11 ENCOUNTER — TELEPHONE (OUTPATIENT)
Dept: GASTROENTEROLOGY | Facility: CLINIC | Age: 23
End: 2023-01-11
Payer: MEDICAID

## 2023-01-11 DIAGNOSIS — K51.019 ULCERATIVE PANCOLITIS WITH COMPLICATION: ICD-10-CM

## 2023-01-11 DIAGNOSIS — D50.0 IRON DEFICIENCY ANEMIA DUE TO CHRONIC BLOOD LOSS: Primary | ICD-10-CM

## 2023-01-11 NOTE — TELEPHONE ENCOUNTER
"Spoke with Tess. Pt c/o continued rectal bleeding. States, "He is terrified he will end up back in the hospital. He is still bleeding and he thinks his lips are looking lighter." Pt completed Feraheme x2 on 12/29/22. For some reason Inflectra was not started at that visit. He is sched for his first Inflectra infusion on 1/20/23. Mom will confirm with Saji that he is still taking his Prednisone. Pt and mother are questioning if labs should be done now?  Please advise  "

## 2023-01-11 NOTE — TELEPHONE ENCOUNTER
----- Message from Jyoti Sorto sent at 1/10/2023 11:09 AM CST -----  Pt mom called to speak to Shasta to see if labs can be ordered, pt was in hospital a few weeks ago and he is still loosing blood and feeling weak. Please advise  Pt mom # [phone]

## 2023-01-12 ENCOUNTER — LAB VISIT (OUTPATIENT)
Dept: LAB | Facility: HOSPITAL | Age: 23
End: 2023-01-12
Attending: INTERNAL MEDICINE
Payer: MEDICAID

## 2023-01-12 DIAGNOSIS — D50.0 IRON DEFICIENCY ANEMIA DUE TO CHRONIC BLOOD LOSS: ICD-10-CM

## 2023-01-12 DIAGNOSIS — K51.019 ULCERATIVE PANCOLITIS WITH COMPLICATION: ICD-10-CM

## 2023-01-12 LAB
ALBUMIN SERPL-MCNC: 3.6 G/DL (ref 3.5–5)
ALBUMIN/GLOB SERPL: 1.1 RATIO (ref 1.1–2)
ALP SERPL-CCNC: 67 UNIT/L (ref 40–150)
ALT SERPL-CCNC: 20 UNIT/L (ref 0–55)
ANISOCYTOSIS BLD QL SMEAR: ABNORMAL
AST SERPL-CCNC: 13 UNIT/L (ref 5–34)
BILIRUBIN DIRECT+TOT PNL SERPL-MCNC: 0.2 MG/DL
BUN SERPL-MCNC: 7.4 MG/DL (ref 8.9–20.6)
CALCIUM SERPL-MCNC: 9.3 MG/DL (ref 8.4–10.2)
CHLORIDE SERPL-SCNC: 110 MMOL/L (ref 98–107)
CO2 SERPL-SCNC: 26 MMOL/L (ref 22–29)
CREAT SERPL-MCNC: 0.83 MG/DL (ref 0.73–1.18)
ELLIPTOCYTOSIS (OHS): ABNORMAL
ERYTHROCYTE [DISTWIDTH] IN BLOOD BY AUTOMATED COUNT: 26.5 % (ref 11.5–17)
GFR SERPLBLD CREATININE-BSD FMLA CKD-EPI: >60 MLS/MIN/1.73/M2
GLOBULIN SER-MCNC: 3.2 GM/DL (ref 2.4–3.5)
GLUCOSE SERPL-MCNC: 96 MG/DL (ref 74–100)
HCT VFR BLD AUTO: 37.7 % (ref 42–52)
HGB BLD-MCNC: 11.5 GM/DL (ref 14–18)
HYPOCHROMIA BLD QL SMEAR: SLIGHT
IMM GRANULOCYTES # BLD AUTO: 0.01 X10(3)/MCL (ref 0–0.04)
IMM GRANULOCYTES NFR BLD AUTO: 0.2 %
MCH RBC QN AUTO: 26.1 PG
MCHC RBC AUTO-ENTMCNC: 30.5 MG/DL (ref 33–36)
MCV RBC AUTO: 85.7 FL (ref 80–94)
NRBC BLD AUTO-RTO: 0 %
PLATELET # BLD AUTO: 382 X10(3)/MCL (ref 130–400)
PLATELET # BLD EST: ADEQUATE 10*3/UL
PMV BLD AUTO: 8.9 FL (ref 7.4–10.4)
POIKILOCYTOSIS BLD QL SMEAR: ABNORMAL
POTASSIUM SERPL-SCNC: 4 MMOL/L (ref 3.5–5.1)
PROT SERPL-MCNC: 6.8 GM/DL (ref 6.4–8.3)
RBC # BLD AUTO: 4.4 X10(6)/MCL (ref 4.7–6.1)
RBC MORPH BLD: ABNORMAL
SODIUM SERPL-SCNC: 142 MMOL/L (ref 136–145)
TEAR DROP CELL (OLG): SLIGHT
WBC # SPEC AUTO: 5.7 X10(3)/MCL (ref 4.5–11.5)

## 2023-01-12 PROCEDURE — 85025 COMPLETE CBC W/AUTO DIFF WBC: CPT

## 2023-01-12 PROCEDURE — 36415 COLL VENOUS BLD VENIPUNCTURE: CPT

## 2023-01-12 PROCEDURE — 80053 COMPREHEN METABOLIC PANEL: CPT

## 2023-01-20 ENCOUNTER — INFUSION (OUTPATIENT)
Dept: INFUSION THERAPY | Facility: HOSPITAL | Age: 23
End: 2023-01-20
Attending: INTERNAL MEDICINE
Payer: MEDICAID

## 2023-01-20 VITALS
BODY MASS INDEX: 26.88 KG/M2 | HEART RATE: 73 BPM | SYSTOLIC BLOOD PRESSURE: 108 MMHG | OXYGEN SATURATION: 100 % | TEMPERATURE: 98 F | HEIGHT: 73 IN | DIASTOLIC BLOOD PRESSURE: 63 MMHG | RESPIRATION RATE: 20 BRPM | WEIGHT: 202.81 LBS

## 2023-01-20 DIAGNOSIS — K51.019 ULCERATIVE PANCOLITIS WITH COMPLICATION: Primary | ICD-10-CM

## 2023-01-20 DIAGNOSIS — K51.919 ULCERATIVE COLITIS WITH COMPLICATION, UNSPECIFIED LOCATION: Primary | ICD-10-CM

## 2023-01-20 PROCEDURE — 63600175 PHARM REV CODE 636 W HCPCS: Performed by: INTERNAL MEDICINE

## 2023-01-20 PROCEDURE — 96375 TX/PRO/DX INJ NEW DRUG ADDON: CPT

## 2023-01-20 PROCEDURE — 96413 CHEMO IV INFUSION 1 HR: CPT

## 2023-01-20 PROCEDURE — 96415 CHEMO IV INFUSION ADDL HR: CPT

## 2023-01-20 PROCEDURE — 25000003 PHARM REV CODE 250: Performed by: INTERNAL MEDICINE

## 2023-01-20 RX ORDER — DIPHENHYDRAMINE HYDROCHLORIDE 50 MG/ML
25 INJECTION INTRAMUSCULAR; INTRAVENOUS
Status: COMPLETED | OUTPATIENT
Start: 2023-01-20 | End: 2023-01-20

## 2023-01-20 RX ORDER — IPRATROPIUM BROMIDE AND ALBUTEROL SULFATE 2.5; .5 MG/3ML; MG/3ML
3 SOLUTION RESPIRATORY (INHALATION)
Status: CANCELLED | OUTPATIENT
Start: 2023-02-23

## 2023-01-20 RX ORDER — HEPARIN 100 UNIT/ML
500 SYRINGE INTRAVENOUS
Status: CANCELLED | OUTPATIENT
Start: 2023-02-23

## 2023-01-20 RX ORDER — EPINEPHRINE 0.3 MG/.3ML
0.3 INJECTION SUBCUTANEOUS ONCE AS NEEDED
Status: DISCONTINUED | OUTPATIENT
Start: 2023-01-20 | End: 2023-01-20 | Stop reason: HOSPADM

## 2023-01-20 RX ORDER — IPRATROPIUM BROMIDE AND ALBUTEROL SULFATE 2.5; .5 MG/3ML; MG/3ML
3 SOLUTION RESPIRATORY (INHALATION)
Status: ACTIVE | OUTPATIENT
Start: 2023-01-20 | End: 2023-01-20

## 2023-01-20 RX ORDER — DIPHENHYDRAMINE HYDROCHLORIDE 50 MG/ML
50 INJECTION INTRAMUSCULAR; INTRAVENOUS ONCE AS NEEDED
Status: DISCONTINUED | OUTPATIENT
Start: 2023-01-20 | End: 2023-01-20 | Stop reason: HOSPADM

## 2023-01-20 RX ORDER — SODIUM CHLORIDE 0.9 % (FLUSH) 0.9 %
10 SYRINGE (ML) INJECTION
Status: CANCELLED | OUTPATIENT
Start: 2023-02-23

## 2023-01-20 RX ORDER — METHYLPREDNISOLONE SOD SUCC 125 MG
40 VIAL (EA) INJECTION
Status: CANCELLED | OUTPATIENT
Start: 2023-02-23

## 2023-01-20 RX ORDER — ACETAMINOPHEN 500 MG
500 TABLET ORAL
Status: CANCELLED | OUTPATIENT
Start: 2023-02-23

## 2023-01-20 RX ORDER — DIPHENHYDRAMINE HYDROCHLORIDE 50 MG/ML
50 INJECTION INTRAMUSCULAR; INTRAVENOUS ONCE AS NEEDED
Status: CANCELLED | OUTPATIENT
Start: 2023-02-23

## 2023-01-20 RX ORDER — ACETAMINOPHEN 325 MG/1
650 TABLET ORAL
Status: ACTIVE | OUTPATIENT
Start: 2023-01-20 | End: 2023-01-20

## 2023-01-20 RX ORDER — ACETAMINOPHEN 500 MG
500 TABLET ORAL
Status: COMPLETED | OUTPATIENT
Start: 2023-01-20 | End: 2023-01-20

## 2023-01-20 RX ORDER — DIPHENHYDRAMINE HYDROCHLORIDE 50 MG/ML
25 INJECTION INTRAMUSCULAR; INTRAVENOUS
Status: CANCELLED | OUTPATIENT
Start: 2023-02-23

## 2023-01-20 RX ORDER — ACETAMINOPHEN 325 MG/1
650 TABLET ORAL
Status: CANCELLED | OUTPATIENT
Start: 2023-02-23

## 2023-01-20 RX ORDER — SODIUM CHLORIDE 0.9 % (FLUSH) 0.9 %
10 SYRINGE (ML) INJECTION
Status: DISCONTINUED | OUTPATIENT
Start: 2023-01-20 | End: 2023-01-20 | Stop reason: HOSPADM

## 2023-01-20 RX ORDER — METHYLPREDNISOLONE SOD SUCC 125 MG
40 VIAL (EA) INJECTION
Status: COMPLETED | OUTPATIENT
Start: 2023-01-20 | End: 2023-01-20

## 2023-01-20 RX ORDER — HEPARIN 100 UNIT/ML
500 SYRINGE INTRAVENOUS
Status: DISCONTINUED | OUTPATIENT
Start: 2023-01-20 | End: 2023-01-20 | Stop reason: HOSPADM

## 2023-01-20 RX ORDER — EPINEPHRINE 0.3 MG/.3ML
0.3 INJECTION SUBCUTANEOUS ONCE AS NEEDED
Status: CANCELLED | OUTPATIENT
Start: 2023-02-23

## 2023-01-20 RX ADMIN — SODIUM CHLORIDE: 9 INJECTION, SOLUTION INTRAVENOUS at 08:01

## 2023-01-20 RX ADMIN — DIPHENHYDRAMINE HYDROCHLORIDE 25 MG: 50 INJECTION INTRAMUSCULAR; INTRAVENOUS at 08:01

## 2023-01-20 RX ADMIN — ACETAMINOPHEN 500 MG: 500 TABLET ORAL at 08:01

## 2023-01-20 RX ADMIN — SODIUM CHLORIDE 460 MG: 9 INJECTION, SOLUTION INTRAVENOUS at 08:01

## 2023-01-20 RX ADMIN — METHYLPREDNISOLONE SODIUM SUCCINATE 40 MG: 125 INJECTION, POWDER, FOR SOLUTION INTRAMUSCULAR; INTRAVENOUS at 08:01

## 2023-02-03 ENCOUNTER — INFUSION (OUTPATIENT)
Dept: INFUSION THERAPY | Facility: HOSPITAL | Age: 23
End: 2023-02-03
Attending: INTERNAL MEDICINE
Payer: MEDICAID

## 2023-02-03 VITALS
WEIGHT: 209.69 LBS | BODY MASS INDEX: 27.79 KG/M2 | SYSTOLIC BLOOD PRESSURE: 119 MMHG | OXYGEN SATURATION: 100 % | DIASTOLIC BLOOD PRESSURE: 67 MMHG | TEMPERATURE: 98 F | HEIGHT: 73 IN | RESPIRATION RATE: 18 BRPM | HEART RATE: 57 BPM

## 2023-02-03 DIAGNOSIS — K51.919 ULCERATIVE COLITIS WITH COMPLICATION, UNSPECIFIED LOCATION: Primary | ICD-10-CM

## 2023-02-03 PROCEDURE — 96415 CHEMO IV INFUSION ADDL HR: CPT

## 2023-02-03 PROCEDURE — 25000003 PHARM REV CODE 250: Performed by: INTERNAL MEDICINE

## 2023-02-03 PROCEDURE — A4216 STERILE WATER/SALINE, 10 ML: HCPCS | Performed by: INTERNAL MEDICINE

## 2023-02-03 PROCEDURE — 96413 CHEMO IV INFUSION 1 HR: CPT

## 2023-02-03 PROCEDURE — 63600175 PHARM REV CODE 636 W HCPCS: Mod: JG | Performed by: INTERNAL MEDICINE

## 2023-02-03 PROCEDURE — 96375 TX/PRO/DX INJ NEW DRUG ADDON: CPT

## 2023-02-03 RX ORDER — IPRATROPIUM BROMIDE AND ALBUTEROL SULFATE 2.5; .5 MG/3ML; MG/3ML
3 SOLUTION RESPIRATORY (INHALATION)
Status: CANCELLED | OUTPATIENT
Start: 2023-04-28

## 2023-02-03 RX ORDER — METHYLPREDNISOLONE SOD SUCC 125 MG
40 VIAL (EA) INJECTION
Status: COMPLETED | OUTPATIENT
Start: 2023-02-03 | End: 2023-02-03

## 2023-02-03 RX ORDER — DIPHENHYDRAMINE HYDROCHLORIDE 50 MG/ML
50 INJECTION INTRAMUSCULAR; INTRAVENOUS ONCE AS NEEDED
Status: CANCELLED | OUTPATIENT
Start: 2023-04-28

## 2023-02-03 RX ORDER — HEPARIN 100 UNIT/ML
500 SYRINGE INTRAVENOUS
Status: DISCONTINUED | OUTPATIENT
Start: 2023-02-03 | End: 2023-02-03 | Stop reason: HOSPADM

## 2023-02-03 RX ORDER — SODIUM CHLORIDE 0.9 % (FLUSH) 0.9 %
10 SYRINGE (ML) INJECTION
Status: DISCONTINUED | OUTPATIENT
Start: 2023-02-03 | End: 2023-02-03 | Stop reason: HOSPADM

## 2023-02-03 RX ORDER — DIPHENHYDRAMINE HYDROCHLORIDE 50 MG/ML
25 INJECTION INTRAMUSCULAR; INTRAVENOUS
Status: COMPLETED | OUTPATIENT
Start: 2023-02-03 | End: 2023-02-03

## 2023-02-03 RX ORDER — ACETAMINOPHEN 500 MG
500 TABLET ORAL
Status: CANCELLED | OUTPATIENT
Start: 2023-04-28

## 2023-02-03 RX ORDER — HEPARIN 100 UNIT/ML
500 SYRINGE INTRAVENOUS
Status: CANCELLED | OUTPATIENT
Start: 2023-04-28

## 2023-02-03 RX ORDER — ACETAMINOPHEN 500 MG
500 TABLET ORAL
Status: COMPLETED | OUTPATIENT
Start: 2023-02-03 | End: 2023-02-03

## 2023-02-03 RX ORDER — IPRATROPIUM BROMIDE AND ALBUTEROL SULFATE 2.5; .5 MG/3ML; MG/3ML
3 SOLUTION RESPIRATORY (INHALATION)
Status: ACTIVE | OUTPATIENT
Start: 2023-02-03 | End: 2023-02-03

## 2023-02-03 RX ORDER — EPINEPHRINE 0.3 MG/.3ML
0.3 INJECTION SUBCUTANEOUS ONCE AS NEEDED
Status: CANCELLED | OUTPATIENT
Start: 2023-04-28

## 2023-02-03 RX ORDER — ACETAMINOPHEN 325 MG/1
650 TABLET ORAL
Status: ACTIVE | OUTPATIENT
Start: 2023-02-03 | End: 2023-02-03

## 2023-02-03 RX ORDER — METHYLPREDNISOLONE SOD SUCC 125 MG
40 VIAL (EA) INJECTION
Status: CANCELLED | OUTPATIENT
Start: 2023-04-28

## 2023-02-03 RX ORDER — EPINEPHRINE 0.3 MG/.3ML
0.3 INJECTION SUBCUTANEOUS ONCE AS NEEDED
Status: DISCONTINUED | OUTPATIENT
Start: 2023-02-03 | End: 2023-02-03 | Stop reason: HOSPADM

## 2023-02-03 RX ORDER — DIPHENHYDRAMINE HYDROCHLORIDE 50 MG/ML
25 INJECTION INTRAMUSCULAR; INTRAVENOUS
Status: CANCELLED | OUTPATIENT
Start: 2023-04-28

## 2023-02-03 RX ORDER — SODIUM CHLORIDE 0.9 % (FLUSH) 0.9 %
10 SYRINGE (ML) INJECTION
Status: CANCELLED | OUTPATIENT
Start: 2023-04-28

## 2023-02-03 RX ORDER — ACETAMINOPHEN 325 MG/1
650 TABLET ORAL
Status: CANCELLED | OUTPATIENT
Start: 2023-04-28

## 2023-02-03 RX ORDER — DIPHENHYDRAMINE HYDROCHLORIDE 50 MG/ML
50 INJECTION INTRAMUSCULAR; INTRAVENOUS ONCE AS NEEDED
Status: DISCONTINUED | OUTPATIENT
Start: 2023-02-03 | End: 2023-02-03 | Stop reason: HOSPADM

## 2023-02-03 RX ADMIN — Medication 10 ML: at 08:02

## 2023-02-03 RX ADMIN — ACETAMINOPHEN 500 MG: 500 TABLET ORAL at 08:02

## 2023-02-03 RX ADMIN — SODIUM CHLORIDE 460 MG: 9 INJECTION, SOLUTION INTRAVENOUS at 09:02

## 2023-02-03 RX ADMIN — METHYLPREDNISOLONE SODIUM SUCCINATE 40 MG: 125 INJECTION, POWDER, FOR SOLUTION INTRAMUSCULAR; INTRAVENOUS at 09:02

## 2023-02-03 RX ADMIN — SODIUM CHLORIDE: 9 INJECTION, SOLUTION INTRAVENOUS at 09:02

## 2023-02-03 RX ADMIN — DIPHENHYDRAMINE HYDROCHLORIDE 25 MG: 50 INJECTION INTRAMUSCULAR; INTRAVENOUS at 08:02

## 2023-02-20 ENCOUNTER — PATIENT MESSAGE (OUTPATIENT)
Dept: GASTROENTEROLOGY | Facility: CLINIC | Age: 23
End: 2023-02-20
Payer: MEDICAID

## 2023-02-22 DIAGNOSIS — K51.919 ULCERATIVE COLITIS WITH COMPLICATION, UNSPECIFIED LOCATION: Primary | ICD-10-CM

## 2023-02-24 ENCOUNTER — LAB VISIT (OUTPATIENT)
Dept: LAB | Facility: HOSPITAL | Age: 23
End: 2023-02-24
Attending: INTERNAL MEDICINE
Payer: MEDICAID

## 2023-02-24 DIAGNOSIS — K51.919 ULCERATIVE COLITIS WITH COMPLICATION, UNSPECIFIED LOCATION: ICD-10-CM

## 2023-02-24 LAB
ALBUMIN SERPL-MCNC: 4.1 G/DL (ref 3.5–5)
ALBUMIN/GLOB SERPL: 1.3 RATIO (ref 1.1–2)
ALP SERPL-CCNC: 61 UNIT/L (ref 40–150)
ALT SERPL-CCNC: 12 UNIT/L (ref 0–55)
AST SERPL-CCNC: 15 UNIT/L (ref 5–34)
BASOPHILS # BLD AUTO: 0.06 X10(3)/MCL (ref 0–0.2)
BASOPHILS NFR BLD AUTO: 0.7 %
BILIRUBIN DIRECT+TOT PNL SERPL-MCNC: 0.4 MG/DL
BUN SERPL-MCNC: 6.4 MG/DL (ref 8.9–20.6)
CALCIUM SERPL-MCNC: 9.4 MG/DL (ref 8.4–10.2)
CHLORIDE SERPL-SCNC: 104 MMOL/L (ref 98–107)
CO2 SERPL-SCNC: 27 MMOL/L (ref 22–29)
CREAT SERPL-MCNC: 0.93 MG/DL (ref 0.73–1.18)
EOSINOPHIL # BLD AUTO: 0.53 X10(3)/MCL (ref 0–0.9)
EOSINOPHIL NFR BLD AUTO: 6.5 %
ERYTHROCYTE [DISTWIDTH] IN BLOOD BY AUTOMATED COUNT: 18.1 % (ref 11.5–17)
FERRITIN SERPL-MCNC: 7.89 NG/ML (ref 21.81–274.66)
GFR SERPLBLD CREATININE-BSD FMLA CKD-EPI: >60 MLS/MIN/1.73/M2
GLOBULIN SER-MCNC: 3.1 GM/DL (ref 2.4–3.5)
GLUCOSE SERPL-MCNC: 99 MG/DL (ref 74–100)
HCT VFR BLD AUTO: 34.6 % (ref 42–52)
HGB BLD-MCNC: 10.9 G/DL (ref 14–18)
IMM GRANULOCYTES # BLD AUTO: 0.08 X10(3)/MCL (ref 0–0.04)
IMM GRANULOCYTES NFR BLD AUTO: 1 %
IRON SATN MFR SERPL: 5 % (ref 20–50)
IRON SERPL-MCNC: 18 UG/DL (ref 65–175)
LYMPHOCYTES # BLD AUTO: 1.88 X10(3)/MCL (ref 0.6–4.6)
LYMPHOCYTES NFR BLD AUTO: 23.2 %
MCH RBC QN AUTO: 25.5 PG
MCHC RBC AUTO-ENTMCNC: 31.5 G/DL (ref 33–36)
MCV RBC AUTO: 80.8 FL (ref 80–94)
MONOCYTES # BLD AUTO: 0.81 X10(3)/MCL (ref 0.1–1.3)
MONOCYTES NFR BLD AUTO: 10 %
NEUTROPHILS # BLD AUTO: 4.75 X10(3)/MCL (ref 2.1–9.2)
NEUTROPHILS NFR BLD AUTO: 58.6 %
NRBC BLD AUTO-RTO: 0 %
PLATELET # BLD AUTO: 492 X10(3)/MCL (ref 130–400)
PMV BLD AUTO: 8.9 FL (ref 7.4–10.4)
POTASSIUM SERPL-SCNC: 3.5 MMOL/L (ref 3.5–5.1)
PROT SERPL-MCNC: 7.2 GM/DL (ref 6.4–8.3)
RBC # BLD AUTO: 4.28 X10(6)/MCL (ref 4.7–6.1)
SODIUM SERPL-SCNC: 136 MMOL/L (ref 136–145)
TIBC SERPL-MCNC: 326 UG/DL (ref 69–240)
TIBC SERPL-MCNC: 344 UG/DL (ref 250–450)
TRANSFERRIN SERPL-MCNC: 301 MG/DL (ref 174–364)
WBC # SPEC AUTO: 8.1 X10(3)/MCL (ref 4.5–11.5)

## 2023-02-24 PROCEDURE — 85025 COMPLETE CBC W/AUTO DIFF WBC: CPT

## 2023-02-24 PROCEDURE — 83550 IRON BINDING TEST: CPT

## 2023-02-24 PROCEDURE — 36415 COLL VENOUS BLD VENIPUNCTURE: CPT

## 2023-02-24 PROCEDURE — 82728 ASSAY OF FERRITIN: CPT

## 2023-02-24 PROCEDURE — 80053 COMPREHEN METABOLIC PANEL: CPT

## 2023-02-28 ENCOUNTER — TELEPHONE (OUTPATIENT)
Dept: INFUSION THERAPY | Facility: HOSPITAL | Age: 23
End: 2023-02-28
Payer: MEDICAID

## 2023-02-28 ENCOUNTER — TELEPHONE (OUTPATIENT)
Dept: GASTROENTEROLOGY | Facility: CLINIC | Age: 23
End: 2023-02-28
Payer: MEDICAID

## 2023-02-28 DIAGNOSIS — D64.9 SYMPTOMATIC ANEMIA: Primary | ICD-10-CM

## 2023-02-28 DIAGNOSIS — K51.019 ULCERATIVE PANCOLITIS WITH COMPLICATION: Primary | ICD-10-CM

## 2023-02-28 DIAGNOSIS — D50.0 IRON DEFICIENCY ANEMIA DUE TO CHRONIC BLOOD LOSS: ICD-10-CM

## 2023-02-28 RX ORDER — HEPARIN 100 UNIT/ML
500 SYRINGE INTRAVENOUS
Status: CANCELLED | OUTPATIENT
Start: 2023-02-28

## 2023-02-28 RX ORDER — DIPHENHYDRAMINE HYDROCHLORIDE 50 MG/ML
50 INJECTION INTRAMUSCULAR; INTRAVENOUS ONCE AS NEEDED
Status: CANCELLED | OUTPATIENT
Start: 2023-02-28

## 2023-02-28 RX ORDER — EPINEPHRINE 0.3 MG/.3ML
0.3 INJECTION SUBCUTANEOUS ONCE AS NEEDED
Status: CANCELLED | OUTPATIENT
Start: 2023-02-28

## 2023-02-28 RX ORDER — SODIUM CHLORIDE 0.9 % (FLUSH) 0.9 %
10 SYRINGE (ML) INJECTION
Status: CANCELLED | OUTPATIENT
Start: 2023-02-28

## 2023-02-28 RX ORDER — METHYLPREDNISOLONE SOD SUCC 125 MG
125 VIAL (EA) INJECTION ONCE AS NEEDED
Status: CANCELLED | OUTPATIENT
Start: 2023-02-28

## 2023-02-28 NOTE — TELEPHONE ENCOUNTER
----- Message from Eleonora Philip MD sent at 2/27/2023  6:45 PM CST -----  Hemoglobin is stable but remains iron deficient.  Would benefit from repeat iron infusions.  Following last loading dose, he needs to check in to let us know how he is doing and get fecal calprotectin. If still high and having diarrhea, then will not wait full 8 weeks for first maintenance dose. Thanks.

## 2023-02-28 NOTE — PROGRESS NOTES
Hemoglobin is stable but remains iron deficient.  Would benefit from repeat iron infusions.  Following last loading dose, he needs to check in to let us know how he is doing and get fecal calprotectin. If still high and having diarrhea, then will not wait full 8 weeks for first maintenance dose. Thanks.

## 2023-03-01 DIAGNOSIS — K51.019 ULCERATIVE PANCOLITIS WITH COMPLICATION: Primary | ICD-10-CM

## 2023-03-01 RX ORDER — SODIUM CHLORIDE 0.9 % (FLUSH) 0.9 %
10 SYRINGE (ML) INJECTION
Status: CANCELLED | OUTPATIENT
Start: 2023-03-01

## 2023-03-01 RX ORDER — HEPARIN 100 UNIT/ML
500 SYRINGE INTRAVENOUS
Status: CANCELLED | OUTPATIENT
Start: 2023-03-01

## 2023-03-01 RX ORDER — DIPHENHYDRAMINE HYDROCHLORIDE 50 MG/ML
50 INJECTION INTRAMUSCULAR; INTRAVENOUS ONCE AS NEEDED
Status: CANCELLED | OUTPATIENT
Start: 2023-03-01

## 2023-03-01 RX ORDER — METHYLPREDNISOLONE SOD SUCC 125 MG
125 VIAL (EA) INJECTION ONCE AS NEEDED
Status: CANCELLED | OUTPATIENT
Start: 2023-03-01

## 2023-03-01 RX ORDER — EPINEPHRINE 0.3 MG/.3ML
0.3 INJECTION SUBCUTANEOUS ONCE AS NEEDED
Status: CANCELLED | OUTPATIENT
Start: 2023-03-01

## 2023-03-03 ENCOUNTER — CLINICAL SUPPORT (OUTPATIENT)
Dept: HEMATOLOGY/ONCOLOGY | Facility: CLINIC | Age: 23
End: 2023-03-03
Payer: MEDICAID

## 2023-03-03 ENCOUNTER — INFUSION (OUTPATIENT)
Dept: INFUSION THERAPY | Facility: HOSPITAL | Age: 23
End: 2023-03-03
Attending: INTERNAL MEDICINE
Payer: MEDICAID

## 2023-03-03 VITALS
TEMPERATURE: 98 F | OXYGEN SATURATION: 98 % | HEART RATE: 70 BPM | DIASTOLIC BLOOD PRESSURE: 50 MMHG | BODY MASS INDEX: 26.04 KG/M2 | RESPIRATION RATE: 20 BRPM | SYSTOLIC BLOOD PRESSURE: 108 MMHG | WEIGHT: 197.31 LBS

## 2023-03-03 DIAGNOSIS — D64.9 SYMPTOMATIC ANEMIA: ICD-10-CM

## 2023-03-03 DIAGNOSIS — K51.019 ULCERATIVE PANCOLITIS WITH COMPLICATION: ICD-10-CM

## 2023-03-03 DIAGNOSIS — D50.0 IRON DEFICIENCY ANEMIA DUE TO CHRONIC BLOOD LOSS: ICD-10-CM

## 2023-03-03 DIAGNOSIS — K51.919 ULCERATIVE COLITIS WITH COMPLICATION, UNSPECIFIED LOCATION: Primary | ICD-10-CM

## 2023-03-03 PROCEDURE — 96415 CHEMO IV INFUSION ADDL HR: CPT

## 2023-03-03 PROCEDURE — 96375 TX/PRO/DX INJ NEW DRUG ADDON: CPT

## 2023-03-03 PROCEDURE — 25000003 PHARM REV CODE 250: Performed by: INTERNAL MEDICINE

## 2023-03-03 PROCEDURE — 96413 CHEMO IV INFUSION 1 HR: CPT

## 2023-03-03 PROCEDURE — 96367 TX/PROPH/DG ADDL SEQ IV INF: CPT

## 2023-03-03 PROCEDURE — 80230 DRUG ASSAY INFLIXIMAB: CPT

## 2023-03-03 PROCEDURE — 36415 COLL VENOUS BLD VENIPUNCTURE: CPT

## 2023-03-03 PROCEDURE — 63600175 PHARM REV CODE 636 W HCPCS: Mod: JZ,JG | Performed by: INTERNAL MEDICINE

## 2023-03-03 RX ORDER — SODIUM CHLORIDE 0.9 % (FLUSH) 0.9 %
10 SYRINGE (ML) INJECTION
Status: DISCONTINUED | OUTPATIENT
Start: 2023-03-03 | End: 2023-03-03 | Stop reason: HOSPADM

## 2023-03-03 RX ORDER — METHYLPREDNISOLONE SOD SUCC 125 MG
40 VIAL (EA) INJECTION
Status: COMPLETED | OUTPATIENT
Start: 2023-03-03 | End: 2023-03-03

## 2023-03-03 RX ORDER — HEPARIN 100 UNIT/ML
500 SYRINGE INTRAVENOUS
Status: DISCONTINUED | OUTPATIENT
Start: 2023-03-03 | End: 2023-03-03 | Stop reason: HOSPADM

## 2023-03-03 RX ORDER — SODIUM CHLORIDE 0.9 % (FLUSH) 0.9 %
10 SYRINGE (ML) INJECTION
Status: CANCELLED | OUTPATIENT
Start: 2023-03-10

## 2023-03-03 RX ORDER — HEPARIN 100 UNIT/ML
500 SYRINGE INTRAVENOUS
Status: CANCELLED | OUTPATIENT
Start: 2023-04-28

## 2023-03-03 RX ORDER — ACETAMINOPHEN 500 MG
500 TABLET ORAL
Status: COMPLETED | OUTPATIENT
Start: 2023-03-03 | End: 2023-03-03

## 2023-03-03 RX ORDER — DIPHENHYDRAMINE HYDROCHLORIDE 50 MG/ML
50 INJECTION INTRAMUSCULAR; INTRAVENOUS ONCE AS NEEDED
Status: CANCELLED | OUTPATIENT
Start: 2023-03-10

## 2023-03-03 RX ORDER — EPINEPHRINE 0.3 MG/.3ML
0.3 INJECTION SUBCUTANEOUS ONCE AS NEEDED
Status: CANCELLED | OUTPATIENT
Start: 2023-03-10

## 2023-03-03 RX ORDER — EPINEPHRINE 0.3 MG/.3ML
0.3 INJECTION SUBCUTANEOUS ONCE AS NEEDED
Status: DISCONTINUED | OUTPATIENT
Start: 2023-03-03 | End: 2023-03-03 | Stop reason: HOSPADM

## 2023-03-03 RX ORDER — EPINEPHRINE 0.3 MG/.3ML
0.3 INJECTION SUBCUTANEOUS ONCE AS NEEDED
Status: CANCELLED | OUTPATIENT
Start: 2023-04-28

## 2023-03-03 RX ORDER — DIPHENHYDRAMINE HYDROCHLORIDE 50 MG/ML
25 INJECTION INTRAMUSCULAR; INTRAVENOUS
Status: COMPLETED | OUTPATIENT
Start: 2023-03-03 | End: 2023-03-03

## 2023-03-03 RX ORDER — HEPARIN 100 UNIT/ML
500 SYRINGE INTRAVENOUS
Status: CANCELLED | OUTPATIENT
Start: 2023-03-10

## 2023-03-03 RX ORDER — IPRATROPIUM BROMIDE AND ALBUTEROL SULFATE 2.5; .5 MG/3ML; MG/3ML
3 SOLUTION RESPIRATORY (INHALATION)
Status: CANCELLED | OUTPATIENT
Start: 2023-04-28

## 2023-03-03 RX ORDER — SODIUM CHLORIDE 0.9 % (FLUSH) 0.9 %
10 SYRINGE (ML) INJECTION
Status: CANCELLED | OUTPATIENT
Start: 2023-04-28

## 2023-03-03 RX ORDER — METHYLPREDNISOLONE SOD SUCC 125 MG
125 VIAL (EA) INJECTION ONCE AS NEEDED
Status: CANCELLED | OUTPATIENT
Start: 2023-03-10

## 2023-03-03 RX ORDER — DIPHENHYDRAMINE HYDROCHLORIDE 50 MG/ML
50 INJECTION INTRAMUSCULAR; INTRAVENOUS ONCE AS NEEDED
Status: DISCONTINUED | OUTPATIENT
Start: 2023-03-03 | End: 2023-03-03 | Stop reason: HOSPADM

## 2023-03-03 RX ORDER — ACETAMINOPHEN 325 MG/1
650 TABLET ORAL
Status: CANCELLED | OUTPATIENT
Start: 2023-04-28

## 2023-03-03 RX ORDER — DIPHENHYDRAMINE HYDROCHLORIDE 50 MG/ML
25 INJECTION INTRAMUSCULAR; INTRAVENOUS
Status: CANCELLED | OUTPATIENT
Start: 2023-04-28

## 2023-03-03 RX ORDER — METHYLPREDNISOLONE SOD SUCC 125 MG
40 VIAL (EA) INJECTION
Status: CANCELLED | OUTPATIENT
Start: 2023-04-28

## 2023-03-03 RX ORDER — ACETAMINOPHEN 500 MG
500 TABLET ORAL
Status: CANCELLED | OUTPATIENT
Start: 2023-04-28

## 2023-03-03 RX ORDER — DIPHENHYDRAMINE HYDROCHLORIDE 50 MG/ML
50 INJECTION INTRAMUSCULAR; INTRAVENOUS ONCE AS NEEDED
Status: CANCELLED | OUTPATIENT
Start: 2023-04-28

## 2023-03-03 RX ADMIN — SODIUM CHLORIDE 450 MG: 9 INJECTION, SOLUTION INTRAVENOUS at 11:03

## 2023-03-03 RX ADMIN — DIPHENHYDRAMINE HYDROCHLORIDE 25 MG: 50 INJECTION INTRAMUSCULAR; INTRAVENOUS at 11:03

## 2023-03-03 RX ADMIN — METHYLPREDNISOLONE SODIUM SUCCINATE 40 MG: 125 INJECTION, POWDER, FOR SOLUTION INTRAMUSCULAR; INTRAVENOUS at 11:03

## 2023-03-03 RX ADMIN — ACETAMINOPHEN 500 MG: 500 TABLET ORAL at 11:03

## 2023-03-03 RX ADMIN — SODIUM CHLORIDE 125 MG: 9 INJECTION, SOLUTION INTRAVENOUS at 01:03

## 2023-03-03 RX ADMIN — SODIUM CHLORIDE: 9 INJECTION, SOLUTION INTRAVENOUS at 11:03

## 2023-03-03 NOTE — NURSING
1053  Pt arrived and did labs and is here for week 6 inflectra.  Contacted JAYDEN Hutchison RN that pt was scheduled for labs today and before next 8 week trx.  She stated to do labs today and cancel the next lab.  Kit given to pt to collect stool.  Pt denies any pain.  States is Sob, has nausea and diarrhea.  Pt states he is starting to feel better since he started steroids yesterday.

## 2023-03-08 LAB
CLINICAL BIOCHEMIST REVIEW: NORMAL
INFLIXIMAB SERPL-MCNC: 8.6 MCG/ML

## 2023-03-09 NOTE — PROGRESS NOTES
Week 6 trough level is 8.6 which means his week 14 trough will be much lower.  He cannot wait full 8 weeks for first maintenance infusion.  Will need to change infusions to 5mg/kg every 4 weeks.  Recheck trough after two every 4 week infusions.  Thanks.

## 2023-03-10 ENCOUNTER — INFUSION (OUTPATIENT)
Dept: INFUSION THERAPY | Facility: HOSPITAL | Age: 23
End: 2023-03-10
Attending: INTERNAL MEDICINE
Payer: MEDICAID

## 2023-03-10 VITALS
TEMPERATURE: 98 F | RESPIRATION RATE: 18 BRPM | OXYGEN SATURATION: 100 % | DIASTOLIC BLOOD PRESSURE: 56 MMHG | BODY MASS INDEX: 25.98 KG/M2 | HEIGHT: 73 IN | SYSTOLIC BLOOD PRESSURE: 123 MMHG | WEIGHT: 196 LBS | HEART RATE: 69 BPM

## 2023-03-10 DIAGNOSIS — D64.9 SYMPTOMATIC ANEMIA: Primary | ICD-10-CM

## 2023-03-10 DIAGNOSIS — D50.0 IRON DEFICIENCY ANEMIA DUE TO CHRONIC BLOOD LOSS: ICD-10-CM

## 2023-03-10 PROCEDURE — 25000003 PHARM REV CODE 250: Performed by: INTERNAL MEDICINE

## 2023-03-10 PROCEDURE — 63600175 PHARM REV CODE 636 W HCPCS: Performed by: INTERNAL MEDICINE

## 2023-03-10 PROCEDURE — 96365 THER/PROPH/DIAG IV INF INIT: CPT

## 2023-03-10 RX ORDER — DIPHENHYDRAMINE HYDROCHLORIDE 50 MG/ML
50 INJECTION INTRAMUSCULAR; INTRAVENOUS ONCE AS NEEDED
Status: CANCELLED | OUTPATIENT
Start: 2023-03-17

## 2023-03-10 RX ORDER — ACETAMINOPHEN 325 MG/1
650 TABLET ORAL
Status: CANCELLED | OUTPATIENT
Start: 2023-03-31

## 2023-03-10 RX ORDER — SODIUM CHLORIDE 0.9 % (FLUSH) 0.9 %
10 SYRINGE (ML) INJECTION
Status: CANCELLED | OUTPATIENT
Start: 2023-03-17

## 2023-03-10 RX ORDER — EPINEPHRINE 0.3 MG/.3ML
0.3 INJECTION SUBCUTANEOUS ONCE AS NEEDED
Status: CANCELLED | OUTPATIENT
Start: 2023-03-17

## 2023-03-10 RX ORDER — DIPHENHYDRAMINE HYDROCHLORIDE 50 MG/ML
25 INJECTION INTRAMUSCULAR; INTRAVENOUS
Status: CANCELLED | OUTPATIENT
Start: 2023-03-31

## 2023-03-10 RX ORDER — METHYLPREDNISOLONE SOD SUCC 125 MG
125 VIAL (EA) INJECTION ONCE AS NEEDED
Status: CANCELLED | OUTPATIENT
Start: 2023-03-17

## 2023-03-10 RX ORDER — HEPARIN 100 UNIT/ML
500 SYRINGE INTRAVENOUS
Status: CANCELLED | OUTPATIENT
Start: 2023-03-17

## 2023-03-10 RX ADMIN — SODIUM CHLORIDE 125 MG: 9 INJECTION, SOLUTION INTRAVENOUS at 10:03

## 2023-03-13 ENCOUNTER — TELEPHONE (OUTPATIENT)
Dept: GASTROENTEROLOGY | Facility: CLINIC | Age: 23
End: 2023-03-13
Payer: MEDICAID

## 2023-03-13 NOTE — TELEPHONE ENCOUNTER
----- Message from Eleonora Philip MD sent at 3/8/2023  7:16 PM CST -----  Week 6 trough level is 8.6 which means his week 14 trough will be much lower.  He cannot wait full 8 weeks for first maintenance infusion.  Will need to change infusions to 5mg/kg every 4 weeks.  Recheck trough after two every 4 week infusions.  Thanks.

## 2023-03-15 ENCOUNTER — TELEPHONE (OUTPATIENT)
Dept: GASTROENTEROLOGY | Facility: CLINIC | Age: 23
End: 2023-03-15
Payer: MEDICAID

## 2023-03-15 RX ORDER — EPINEPHRINE 0.3 MG/.3ML
0.3 INJECTION SUBCUTANEOUS ONCE AS NEEDED
Status: CANCELLED | OUTPATIENT
Start: 2023-03-15

## 2023-03-15 RX ORDER — ACETAMINOPHEN 325 MG/1
650 TABLET ORAL
Status: CANCELLED | OUTPATIENT
Start: 2023-03-15

## 2023-03-15 RX ORDER — METHYLPREDNISOLONE SOD SUCC 125 MG
40 VIAL (EA) INJECTION
Status: CANCELLED | OUTPATIENT
Start: 2023-03-15

## 2023-03-15 RX ORDER — DIPHENHYDRAMINE HYDROCHLORIDE 50 MG/ML
25 INJECTION INTRAMUSCULAR; INTRAVENOUS
Status: CANCELLED | OUTPATIENT
Start: 2023-03-15

## 2023-03-15 RX ORDER — IPRATROPIUM BROMIDE AND ALBUTEROL SULFATE 2.5; .5 MG/3ML; MG/3ML
3 SOLUTION RESPIRATORY (INHALATION)
Status: CANCELLED | OUTPATIENT
Start: 2023-03-15

## 2023-03-15 RX ORDER — HEPARIN 100 UNIT/ML
500 SYRINGE INTRAVENOUS
Status: CANCELLED | OUTPATIENT
Start: 2023-03-15

## 2023-03-15 RX ORDER — SODIUM CHLORIDE 0.9 % (FLUSH) 0.9 %
10 SYRINGE (ML) INJECTION
Status: CANCELLED | OUTPATIENT
Start: 2023-03-15

## 2023-03-15 RX ORDER — DIPHENHYDRAMINE HYDROCHLORIDE 50 MG/ML
50 INJECTION INTRAMUSCULAR; INTRAVENOUS ONCE AS NEEDED
Status: CANCELLED | OUTPATIENT
Start: 2023-03-15

## 2023-03-15 NOTE — TELEPHONE ENCOUNTER
----- Message from Carol Garcia MA sent at 3/15/2023  2:45 PM CDT -----  Regarding: FW: PA    ----- Message -----  From: Jyoti Sorto  Sent: 3/14/2023  11:14 AM CDT  To: [facility] Clinical Support Staff  Subject: PA                                               [facility] called to speak to Shasta regarding a PA.  [phone] ex [phone]

## 2023-03-15 NOTE — TELEPHONE ENCOUNTER
PA approved x 1 @ [facility], then pt will have to change site of care. PA in media

## 2023-03-17 ENCOUNTER — INFUSION (OUTPATIENT)
Dept: INFUSION THERAPY | Facility: HOSPITAL | Age: 23
End: 2023-03-17
Attending: INTERNAL MEDICINE
Payer: MEDICAID

## 2023-03-17 VITALS
DIASTOLIC BLOOD PRESSURE: 65 MMHG | BODY MASS INDEX: 25.54 KG/M2 | RESPIRATION RATE: 18 BRPM | HEART RATE: 75 BPM | TEMPERATURE: 98 F | SYSTOLIC BLOOD PRESSURE: 131 MMHG | OXYGEN SATURATION: 100 % | WEIGHT: 193.56 LBS

## 2023-03-17 DIAGNOSIS — D64.9 SYMPTOMATIC ANEMIA: Primary | ICD-10-CM

## 2023-03-17 DIAGNOSIS — D50.0 IRON DEFICIENCY ANEMIA DUE TO CHRONIC BLOOD LOSS: ICD-10-CM

## 2023-03-17 PROCEDURE — 63600175 PHARM REV CODE 636 W HCPCS: Performed by: INTERNAL MEDICINE

## 2023-03-17 PROCEDURE — 96365 THER/PROPH/DIAG IV INF INIT: CPT

## 2023-03-17 PROCEDURE — 25000003 PHARM REV CODE 250: Performed by: INTERNAL MEDICINE

## 2023-03-17 RX ORDER — EPINEPHRINE 0.3 MG/.3ML
0.3 INJECTION SUBCUTANEOUS ONCE AS NEEDED
Status: CANCELLED | OUTPATIENT
Start: 2023-03-24

## 2023-03-17 RX ORDER — METHYLPREDNISOLONE SOD SUCC 125 MG
125 VIAL (EA) INJECTION ONCE AS NEEDED
Status: CANCELLED | OUTPATIENT
Start: 2023-03-24

## 2023-03-17 RX ORDER — HEPARIN 100 UNIT/ML
500 SYRINGE INTRAVENOUS
Status: CANCELLED | OUTPATIENT
Start: 2023-03-24

## 2023-03-17 RX ORDER — SODIUM CHLORIDE 0.9 % (FLUSH) 0.9 %
10 SYRINGE (ML) INJECTION
Status: CANCELLED | OUTPATIENT
Start: 2023-03-24

## 2023-03-17 RX ORDER — DIPHENHYDRAMINE HYDROCHLORIDE 50 MG/ML
50 INJECTION INTRAMUSCULAR; INTRAVENOUS ONCE AS NEEDED
Status: CANCELLED | OUTPATIENT
Start: 2023-03-24

## 2023-03-17 RX ORDER — SODIUM CHLORIDE 0.9 % (FLUSH) 0.9 %
10 SYRINGE (ML) INJECTION
Status: DISCONTINUED | OUTPATIENT
Start: 2023-03-17 | End: 2023-03-17 | Stop reason: HOSPADM

## 2023-03-17 RX ADMIN — SODIUM CHLORIDE: 9 INJECTION, SOLUTION INTRAVENOUS at 11:03

## 2023-03-17 RX ADMIN — SODIUM CHLORIDE 125 MG: 9 INJECTION, SOLUTION INTRAVENOUS at 11:03

## 2023-03-24 ENCOUNTER — INFUSION (OUTPATIENT)
Dept: INFUSION THERAPY | Facility: HOSPITAL | Age: 23
End: 2023-03-24
Attending: INTERNAL MEDICINE
Payer: MEDICAID

## 2023-03-24 VITALS
RESPIRATION RATE: 20 BRPM | DIASTOLIC BLOOD PRESSURE: 69 MMHG | BODY MASS INDEX: 24.79 KG/M2 | OXYGEN SATURATION: 98 % | TEMPERATURE: 98 F | WEIGHT: 187.81 LBS | HEART RATE: 72 BPM | SYSTOLIC BLOOD PRESSURE: 129 MMHG

## 2023-03-24 DIAGNOSIS — D64.9 SYMPTOMATIC ANEMIA: Primary | ICD-10-CM

## 2023-03-24 DIAGNOSIS — D50.0 IRON DEFICIENCY ANEMIA DUE TO CHRONIC BLOOD LOSS: ICD-10-CM

## 2023-03-24 PROCEDURE — 96365 THER/PROPH/DIAG IV INF INIT: CPT

## 2023-03-24 PROCEDURE — 25000003 PHARM REV CODE 250: Performed by: INTERNAL MEDICINE

## 2023-03-24 PROCEDURE — 63600175 PHARM REV CODE 636 W HCPCS: Performed by: INTERNAL MEDICINE

## 2023-03-24 RX ORDER — DIPHENHYDRAMINE HYDROCHLORIDE 50 MG/ML
50 INJECTION INTRAMUSCULAR; INTRAVENOUS ONCE AS NEEDED
Status: CANCELLED | OUTPATIENT
Start: 2023-03-31

## 2023-03-24 RX ORDER — SODIUM CHLORIDE 0.9 % (FLUSH) 0.9 %
10 SYRINGE (ML) INJECTION
Status: DISCONTINUED | OUTPATIENT
Start: 2023-03-24 | End: 2023-03-24 | Stop reason: HOSPADM

## 2023-03-24 RX ORDER — EPINEPHRINE 0.3 MG/.3ML
0.3 INJECTION SUBCUTANEOUS ONCE AS NEEDED
Status: CANCELLED | OUTPATIENT
Start: 2023-03-31

## 2023-03-24 RX ORDER — SODIUM CHLORIDE 0.9 % (FLUSH) 0.9 %
10 SYRINGE (ML) INJECTION
Status: CANCELLED | OUTPATIENT
Start: 2023-03-31

## 2023-03-24 RX ORDER — METHYLPREDNISOLONE SOD SUCC 125 MG
125 VIAL (EA) INJECTION ONCE AS NEEDED
Status: CANCELLED | OUTPATIENT
Start: 2023-03-31

## 2023-03-24 RX ORDER — HEPARIN 100 UNIT/ML
500 SYRINGE INTRAVENOUS
Status: CANCELLED | OUTPATIENT
Start: 2023-03-31

## 2023-03-24 RX ADMIN — SODIUM CHLORIDE 125 MG: 9 INJECTION, SOLUTION INTRAVENOUS at 11:03

## 2023-03-24 RX ADMIN — SODIUM CHLORIDE: 9 INJECTION, SOLUTION INTRAVENOUS at 12:03

## 2023-03-24 NOTE — NURSING
1121  Pt arrived for #4/4 ferrlecit.  Pt immediately had to go to restroom with diarrhea.  Pt has lost wt recently.  Rates LOP 4/10 with abd cramping.  States the iron helps for a little while then he gets tired again.

## 2023-03-31 ENCOUNTER — INFUSION (OUTPATIENT)
Dept: INFUSION THERAPY | Facility: HOSPITAL | Age: 23
End: 2023-03-31
Attending: INTERNAL MEDICINE
Payer: MEDICAID

## 2023-03-31 VITALS
OXYGEN SATURATION: 99 % | DIASTOLIC BLOOD PRESSURE: 57 MMHG | RESPIRATION RATE: 18 BRPM | SYSTOLIC BLOOD PRESSURE: 122 MMHG | TEMPERATURE: 98 F

## 2023-03-31 DIAGNOSIS — K51.919 ULCERATIVE COLITIS WITH COMPLICATION, UNSPECIFIED LOCATION: Primary | ICD-10-CM

## 2023-03-31 DIAGNOSIS — D50.0 IRON DEFICIENCY ANEMIA DUE TO CHRONIC BLOOD LOSS: Primary | ICD-10-CM

## 2023-03-31 PROCEDURE — 63600175 PHARM REV CODE 636 W HCPCS: Performed by: INTERNAL MEDICINE

## 2023-03-31 PROCEDURE — 96413 CHEMO IV INFUSION 1 HR: CPT

## 2023-03-31 PROCEDURE — 25000003 PHARM REV CODE 250: Performed by: INTERNAL MEDICINE

## 2023-03-31 PROCEDURE — 96374 THER/PROPH/DIAG INJ IV PUSH: CPT

## 2023-03-31 PROCEDURE — 96415 CHEMO IV INFUSION ADDL HR: CPT

## 2023-03-31 PROCEDURE — 96375 TX/PRO/DX INJ NEW DRUG ADDON: CPT

## 2023-03-31 RX ORDER — HEPARIN 100 UNIT/ML
500 SYRINGE INTRAVENOUS
OUTPATIENT
Start: 2023-04-16

## 2023-03-31 RX ORDER — IPRATROPIUM BROMIDE AND ALBUTEROL SULFATE 2.5; .5 MG/3ML; MG/3ML
3 SOLUTION RESPIRATORY (INHALATION)
Status: DISPENSED | OUTPATIENT
Start: 2023-03-31 | End: 2023-03-31

## 2023-03-31 RX ORDER — ACETAMINOPHEN 325 MG/1
650 TABLET ORAL
OUTPATIENT
Start: 2023-04-16

## 2023-03-31 RX ORDER — METHYLPREDNISOLONE SOD SUCC 125 MG
40 VIAL (EA) INJECTION
Status: COMPLETED | OUTPATIENT
Start: 2023-03-31 | End: 2023-03-31

## 2023-03-31 RX ORDER — DIPHENHYDRAMINE HYDROCHLORIDE 50 MG/ML
50 INJECTION INTRAMUSCULAR; INTRAVENOUS ONCE AS NEEDED
OUTPATIENT
Start: 2023-04-16

## 2023-03-31 RX ORDER — IPRATROPIUM BROMIDE AND ALBUTEROL SULFATE 2.5; .5 MG/3ML; MG/3ML
3 SOLUTION RESPIRATORY (INHALATION)
OUTPATIENT
Start: 2023-04-16

## 2023-03-31 RX ORDER — METHYLPREDNISOLONE SOD SUCC 125 MG
40 VIAL (EA) INJECTION
OUTPATIENT
Start: 2023-04-16

## 2023-03-31 RX ORDER — SODIUM CHLORIDE 0.9 % (FLUSH) 0.9 %
10 SYRINGE (ML) INJECTION
Status: DISCONTINUED | OUTPATIENT
Start: 2023-03-31 | End: 2023-03-31 | Stop reason: HOSPADM

## 2023-03-31 RX ORDER — ACETAMINOPHEN 325 MG/1
650 TABLET ORAL
Status: ACTIVE | OUTPATIENT
Start: 2023-03-31 | End: 2023-03-31

## 2023-03-31 RX ORDER — ACETAMINOPHEN 325 MG/1
650 TABLET ORAL
Status: COMPLETED | OUTPATIENT
Start: 2023-03-31 | End: 2023-03-31

## 2023-03-31 RX ORDER — HEPARIN 100 UNIT/ML
500 SYRINGE INTRAVENOUS
Status: DISCONTINUED | OUTPATIENT
Start: 2023-03-31 | End: 2023-03-31 | Stop reason: HOSPADM

## 2023-03-31 RX ORDER — DIPHENHYDRAMINE HYDROCHLORIDE 50 MG/ML
25 INJECTION INTRAMUSCULAR; INTRAVENOUS
Status: COMPLETED | OUTPATIENT
Start: 2023-03-31 | End: 2023-03-31

## 2023-03-31 RX ORDER — EPINEPHRINE 0.3 MG/.3ML
0.3 INJECTION SUBCUTANEOUS ONCE AS NEEDED
Status: DISCONTINUED | OUTPATIENT
Start: 2023-03-31 | End: 2023-03-31 | Stop reason: HOSPADM

## 2023-03-31 RX ORDER — FERROUS SULFATE 325(65) MG
325 TABLET ORAL
Qty: 30 TABLET | Refills: 6 | Status: SHIPPED | OUTPATIENT
Start: 2023-03-31

## 2023-03-31 RX ORDER — DIPHENHYDRAMINE HYDROCHLORIDE 50 MG/ML
50 INJECTION INTRAMUSCULAR; INTRAVENOUS ONCE AS NEEDED
Status: DISCONTINUED | OUTPATIENT
Start: 2023-03-31 | End: 2023-03-31 | Stop reason: HOSPADM

## 2023-03-31 RX ORDER — SODIUM CHLORIDE 0.9 % (FLUSH) 0.9 %
10 SYRINGE (ML) INJECTION
OUTPATIENT
Start: 2023-04-16

## 2023-03-31 RX ORDER — EPINEPHRINE 0.3 MG/.3ML
0.3 INJECTION SUBCUTANEOUS ONCE AS NEEDED
OUTPATIENT
Start: 2023-04-16

## 2023-03-31 RX ORDER — DIPHENHYDRAMINE HYDROCHLORIDE 50 MG/ML
25 INJECTION INTRAMUSCULAR; INTRAVENOUS
OUTPATIENT
Start: 2023-04-16

## 2023-03-31 RX ADMIN — DIPHENHYDRAMINE HYDROCHLORIDE 25 MG: 50 INJECTION INTRAMUSCULAR; INTRAVENOUS at 11:03

## 2023-03-31 RX ADMIN — SODIUM CHLORIDE 400 MG: 0.9 INJECTION, SOLUTION INTRAVENOUS at 12:03

## 2023-03-31 RX ADMIN — METHYLPREDNISOLONE SODIUM SUCCINATE 40 MG: 125 INJECTION, POWDER, FOR SOLUTION INTRAMUSCULAR; INTRAVENOUS at 11:03

## 2023-03-31 RX ADMIN — ACETAMINOPHEN 325MG 650 MG: 325 TABLET ORAL at 11:03

## 2023-05-14 ENCOUNTER — PATIENT MESSAGE (OUTPATIENT)
Dept: GASTROENTEROLOGY | Facility: CLINIC | Age: 23
End: 2023-05-14
Payer: MEDICAID

## 2023-05-15 ENCOUNTER — TELEPHONE (OUTPATIENT)
Dept: GASTROENTEROLOGY | Facility: CLINIC | Age: 23
End: 2023-05-15
Payer: MEDICAID

## 2023-05-15 NOTE — TELEPHONE ENCOUNTER
Called Mom to let her know what is going.  New referral to a different infusion in place and they are working with insurance for approval.  Shasta will reach out to her as soon as we get word on coverage.  She verbalized understanding.

## 2023-05-15 NOTE — TELEPHONE ENCOUNTER
----- Message from Jyoti Sorto sent at 5/15/2023 11:56 AM CDT -----  Pt mom Tess left voicemail 5/15 @ 11:16am requesting call back from [employer] regarding pt being due for Infusions. Please conact @ work [phone] or cell [phone]

## 2023-05-24 NOTE — TELEPHONE ENCOUNTER
Insurance denied new PA for facility change.  Denied the whole PA.  Had to resubmit with letter of medical necessity. As of today letter has been sent to insurance.    Notified Mom of update and cancelled his upcoming apt with NP in June.  Notified her once we know when he is infused we can reschedule that apt for the appropriate time frame.  She verbalized understanding.

## 2023-06-08 ENCOUNTER — TELEPHONE (OUTPATIENT)
Dept: GASTROENTEROLOGY | Facility: CLINIC | Age: 23
End: 2023-06-08
Payer: MEDICAID

## 2023-06-08 DIAGNOSIS — K51.019 ULCERATIVE PANCOLITIS WITH COMPLICATION: Primary | ICD-10-CM

## 2023-06-08 NOTE — TELEPHONE ENCOUNTER
Called Mom.  She called asking for another dose of Prednisone.  PT seems to be more fatigued.  No energy.  Stool amount the same since last documentation of 7-8.  He has not complained of abd pain.  She is just worried his blood levels are going down again and she is trying to avoid another INPT stay.      Last dose of Prednisone taper given in March.  We are still waiting for approval from insurance on appeal since Plan change in May. Will await update from [facility].

## 2023-06-08 NOTE — TELEPHONE ENCOUNTER
Called PT.  Informed him that he needs to come complete some blood work and stool sample for us.  He will come in and do that tomorrow.      Insurance appeal for Infusion still pending.  Will keep PT updated.  Dr. Philip does not feel that we should place him on prednisone just yet.

## 2023-06-09 ENCOUNTER — LAB VISIT (OUTPATIENT)
Dept: LAB | Facility: HOSPITAL | Age: 23
End: 2023-06-09
Attending: NURSE PRACTITIONER
Payer: MEDICAID

## 2023-06-09 DIAGNOSIS — K51.019 ULCERATIVE PANCOLITIS WITH COMPLICATION: ICD-10-CM

## 2023-06-09 LAB
BASOPHILS # BLD AUTO: 0.07 X10(3)/MCL
BASOPHILS NFR BLD AUTO: 0.7 %
CRP SERPL-MCNC: 5.7 MG/L
EOSINOPHIL # BLD AUTO: 0.38 X10(3)/MCL (ref 0–0.9)
EOSINOPHIL NFR BLD AUTO: 3.6 %
ERYTHROCYTE [DISTWIDTH] IN BLOOD BY AUTOMATED COUNT: 20.2 % (ref 11.5–17)
FERRITIN SERPL-MCNC: 12.29 NG/ML (ref 21.81–274.66)
HCT VFR BLD AUTO: 37.8 % (ref 42–52)
HGB BLD-MCNC: 11.7 G/DL (ref 14–18)
IMM GRANULOCYTES # BLD AUTO: 0.04 X10(3)/MCL (ref 0–0.04)
IMM GRANULOCYTES NFR BLD AUTO: 0.4 %
IRON SATN MFR SERPL: ABNORMAL %
IRON SERPL-MCNC: 394 UG/DL (ref 65–175)
LYMPHOCYTES # BLD AUTO: 1.89 X10(3)/MCL (ref 0.6–4.6)
LYMPHOCYTES NFR BLD AUTO: 18.1 %
MCH RBC QN AUTO: 25.1 PG (ref 27–31)
MCHC RBC AUTO-ENTMCNC: 31 G/DL (ref 33–36)
MCV RBC AUTO: 81.1 FL (ref 80–94)
MONOCYTES # BLD AUTO: 0.78 X10(3)/MCL (ref 0.1–1.3)
MONOCYTES NFR BLD AUTO: 7.4 %
NEUTROPHILS # BLD AUTO: 7.31 X10(3)/MCL (ref 2.1–9.2)
NEUTROPHILS NFR BLD AUTO: 69.8 %
NRBC BLD AUTO-RTO: 0 %
PLATELET # BLD AUTO: 471 X10(3)/MCL (ref 130–400)
PMV BLD AUTO: 9 FL (ref 7.4–10.4)
RBC # BLD AUTO: 4.66 X10(6)/MCL (ref 4.7–6.1)
TIBC SERPL-MCNC: <25 UG/DL (ref 69–240)
TIBC SERPL-MCNC: <419 UG/DL (ref 250–450)
TRANSFERRIN SERPL-MCNC: 293 MG/DL (ref 174–364)
WBC # SPEC AUTO: 10.47 X10(3)/MCL (ref 4.5–11.5)

## 2023-06-09 PROCEDURE — 83550 IRON BINDING TEST: CPT

## 2023-06-09 PROCEDURE — 82728 ASSAY OF FERRITIN: CPT

## 2023-06-09 PROCEDURE — 36415 COLL VENOUS BLD VENIPUNCTURE: CPT

## 2023-06-09 PROCEDURE — 86140 C-REACTIVE PROTEIN: CPT

## 2023-06-09 PROCEDURE — 85025 COMPLETE CBC W/AUTO DIFF WBC: CPT

## 2023-06-09 PROCEDURE — 83993 ASSAY FOR CALPROTECTIN FECAL: CPT

## 2023-06-12 LAB — CALPROTECTIN STL-MCNT: 1118 MCG/G

## 2023-10-02 ENCOUNTER — OFFICE VISIT (OUTPATIENT)
Dept: GASTROENTEROLOGY | Facility: CLINIC | Age: 23
End: 2023-10-02
Payer: MEDICAID

## 2023-10-02 VITALS
BODY MASS INDEX: 26.82 KG/M2 | DIASTOLIC BLOOD PRESSURE: 63 MMHG | OXYGEN SATURATION: 99 % | HEART RATE: 84 BPM | TEMPERATURE: 98 F | WEIGHT: 198 LBS | HEIGHT: 72 IN | RESPIRATION RATE: 16 BRPM | SYSTOLIC BLOOD PRESSURE: 102 MMHG

## 2023-10-02 DIAGNOSIS — K51.019 ULCERATIVE PANCOLITIS WITH COMPLICATION: Primary | ICD-10-CM

## 2023-10-02 DIAGNOSIS — Z23 IMMUNIZATION DUE: ICD-10-CM

## 2023-10-02 PROCEDURE — 3008F PR BODY MASS INDEX (BMI) DOCUMENTED: ICD-10-PCS | Mod: CPTII,,, | Performed by: NURSE PRACTITIONER

## 2023-10-02 PROCEDURE — 99214 OFFICE O/P EST MOD 30 MIN: CPT | Mod: PBBFAC | Performed by: NURSE PRACTITIONER

## 2023-10-02 PROCEDURE — 3078F DIAST BP <80 MM HG: CPT | Mod: CPTII,,, | Performed by: NURSE PRACTITIONER

## 2023-10-02 PROCEDURE — 1159F PR MEDICATION LIST DOCUMENTED IN MEDICAL RECORD: ICD-10-PCS | Mod: CPTII,,, | Performed by: NURSE PRACTITIONER

## 2023-10-02 PROCEDURE — 99213 OFFICE O/P EST LOW 20 MIN: CPT | Mod: S$PBB,,, | Performed by: NURSE PRACTITIONER

## 2023-10-02 PROCEDURE — 3078F PR MOST RECENT DIASTOLIC BLOOD PRESSURE < 80 MM HG: ICD-10-PCS | Mod: CPTII,,, | Performed by: NURSE PRACTITIONER

## 2023-10-02 PROCEDURE — 3074F SYST BP LT 130 MM HG: CPT | Mod: CPTII,,, | Performed by: NURSE PRACTITIONER

## 2023-10-02 PROCEDURE — 1160F RVW MEDS BY RX/DR IN RCRD: CPT | Mod: CPTII,,, | Performed by: NURSE PRACTITIONER

## 2023-10-02 PROCEDURE — 99213 PR OFFICE/OUTPT VISIT, EST, LEVL III, 20-29 MIN: ICD-10-PCS | Mod: S$PBB,,, | Performed by: NURSE PRACTITIONER

## 2023-10-02 PROCEDURE — 3008F BODY MASS INDEX DOCD: CPT | Mod: CPTII,,, | Performed by: NURSE PRACTITIONER

## 2023-10-02 PROCEDURE — 1160F PR REVIEW ALL MEDS BY PRESCRIBER/CLIN PHARMACIST DOCUMENTED: ICD-10-PCS | Mod: CPTII,,, | Performed by: NURSE PRACTITIONER

## 2023-10-02 PROCEDURE — 1159F MED LIST DOCD IN RCRD: CPT | Mod: CPTII,,, | Performed by: NURSE PRACTITIONER

## 2023-10-02 PROCEDURE — 3074F PR MOST RECENT SYSTOLIC BLOOD PRESSURE < 130 MM HG: ICD-10-PCS | Mod: CPTII,,, | Performed by: NURSE PRACTITIONER

## 2023-10-02 NOTE — PROGRESS NOTES
Inflammatory Bowel Disease        Established Patient Note         TODAY'S VISIT DATE:  10/6/2023  The patient's last visit with me was on Visit date not found.     PCP: Madhu Goodson      Referring MD:   No ref. provider found    History of Present Illness:  Mr. Gutierrez is a 22 year old male with ulcerative colitis on Inflectra 5 mg/kg every 4 weeks, methotrexate 15 mg weekly diagnosed in November 2020 here for follow-up.    His symptoms started in 2019 with rectal bleeding, diarrhea with associated urgency, tenesmus, and lower abdominal cramping. His weight was stable.     Colonoscopy in November 2020 showed Torrez endo Score 2 inflammation (moderate, with marked erythema, absent vascular pattern, friability, erosions) from the rectum to the hepatic flexure. Normal TI, cecum, ascending colon. Biopsies showed moderate to marked chronic active colitis with cryptitis, crypt abscesses, epithelial erosion, increased chronic inflammation and architectural distortion. Right colon biopsies showed mild inflammation. He was started on Apriso 1.5 gm daily and canasa.    Received Feraheme x 2 in December 2020.     On follow-up in February 2021 he reported continued symptoms despite apriso 1.5 gm and uceris 9mg daily.   I stopped apriso and started vedolizumab monotherapy.    He had his induction doses on 03/09/21, 03/24/21, and 04/21/21. His week 14 trough was 12 in June 2021. He was changed to every 4 weeks due to ongoing symptoms and trough was 13 in August 2021 on q 4 weeks.    Initially he noticed less blood and frequency after his first 2 infusions but since then had continued symptoms of bleeding, urgency, diarrhea Infectious stool studies were negative. Fecal calprotectin was 2464 was in August 2021. He was given Uceris then prednisone taper.     Fecal calprotectin was 274 in October 2021. 831 in June 2022.  CRP 5.6  6/22.     December 13, 2021: Flex Sig: Moderately active (Torrez score 2)  ulcerative colitis in rectum and distal sigmoid colon.  Inactive (Torrez Score 0) ulcerative colitis in proximal sigmoid colon and distal descending colon.  He was started on topical rectal therapy - canasa initially then transitioned to tacrolimus suppositories when canasa was not effective.      He was initiated on a Prednisone taper in June 2022 after reported worsening UC related symptoms.  Labs drawn at that time significant for worsening anemia and iron studies with H&H 8.4/28.7 on 6/17/22 (from from 9.4/31.2 on 11/29/21); iron level 7, iron saturation 2.  June 2022: Fecal calprotectin 831.  Patient was essentially non-compliant with his tacrolimus suppositories despite known active disease of rectum and distal colon.  We discussed the need for compliance with topical therapy with consideration of endoscopy to determine if evidence of more active disease proximally.     When seen on 11/15/22, he was not using suppositories due to some burning with Bms following use.  He was having 7-8 loose stools a day bright red blood intermittently in his stool, not with every stool but with most.  He was having tenesmus, urgency and some nocturnal stools at times. Taking diclofenac for his back pain along with baclofen. Hgb 7.5 g/dL in October (checked by ED for back pain).  Alb 3.6.  Not on oral iron.     He presented to ED at [facility] on 12/13/23 with c/o generalized weakness and MARCOS that started 2 days  prior to presentation to ED. He was admitted inpatient for symptomatic anemia secondary UC and GI was consulted. His H & H was 6.1/22 on admission. He received 3 units of PRBCs and Ferrlecit 125 mg x 2 doses while inpatient. EGD normal.  Flex sig with active disease to the transverse colon. Secondary loss of response to vedolizumab with plans to pursue alternative therapy for patient. Started prednisone in the interim. Discussed therapy options. Plan to attempt to get approval for upadacitinib for UC treatment to start as  outpatient.     December 2022: Calprotectin 1527; CRP 6.90. Approval for Rinvoq requested. Feraheme x 2 doses completed.   January 2023: Insurance denied Rinvoq. PA for Infliximab approved. Scheduled to begin Infliximab on 1/20/23. On Prednisone taper.   March 2023: Calprotectin 358, Week 6 Trough 8.6. Infusions changed to 5 mg/kg every 4 weeks. He received Ferrlecit 125 mg IV x 3.     June 2023: Calprotectin 1118, CRP 5.70  July 2023: Started increased dose of Inflectra at 5 mg/kg every 4 weeks. Started receiving infusions with [facility].     Today, reports doing well now. States that this is the best that he has felt in a while. Feels that he now has control over UC instead of UC controlling him. On average, he is having 3-4 Bms daily, loose brown stools and formed stools with slightly more loose stools than formed. Every now and then he will see a drop of red blood in the toilet. Denies having blood in the stools or seeing blood when wiping. Denies abdominal pain.  Eating well with good appetite. Reports recent weight gain. Denies nocturnal stools. Reports sleeping well at night. Reports having hiccups/belching occasionally, denies reflux/heartburn symptoms. Last infusion Thursday, 9/28/23. Reports compliance with methotrexate and folic acid.     He denies regular NSAID use- no longer taking diclofenac. No longer smoking cigars or cigarettes. Smoking marijuana twice daily. Occasional alcohol use, 1 beer per month. He denies a family history of IBD, colon polyps, or colon cancer. Mother has history of diverticulitis.      IBD History:  IBD disease: UC  Disease location: pancolitis        Current IBD Therapy:  Entyvio 300mg every 4 weeks (March 2021 - December 2021)  Tacrolimus suppository   Inflectra (January-present) (Week 0 on 1/20/23. Week 2 on 2/3/23. Week 6 Inflectra 3/3/23). Increased to 5 mg/kg every 4 weeks in March but increased dose did not start until 7/5/23 due to insurance approval.      Therapeutic Drug  Monitoring Labs:  August 2021: Vedo trough 13, Ab < 25 (every 4 weeks)  March 2023: Calprotectin 358, Trough 8.6. Infusions changed to 5 mg/kg every 4 weeks.      Prior IBD Therapies:  Apriso 1.5 gm daily  Canasa 1 gm nightly  uceris 9mg daily  Prednisone        Pertinent Endoscopy/Imaging:  November 11, 2020: Colonoscopy: Torrez Score 2 inflammation (moderate, with marked erythema, absent vascular pattern, friability, erosions) from the rectum to the hepatic flexure. Normal TI, cecum, ascending colon. Biopsies showed moderate to marked chronic active colitis with cryptitis, crypt abscesses, epithelial erosion, increased chronic inflammation and architectural distortion. Right colon biopsies showed mild inflammation.     December 13, 2021: Flex Sig: Moderately active (Torrez score 2) ulcerative colitis in rectum and distal sigmoid colon, unchanged since last examination.  Inactive (Torrez Score 0) ulcerative colitis in proximal sigmoid colon and distal descending colon, improved since last exam    EGD done 12/15/23: Normal esophagus, stomach and examined duodenum. No specimens collected.     Flex sig done 12/15/23: Inflammation was found in a continuous and circumferential pattern  from the anus to the transverse colon, graded as Torrez Score 3 (severe, with spontaneous bleeding, a few scattered ulcerations, loss of folds). Biopsies were taken with a cold forceps for histology to tule out CMV. Pathology: Descending colon biopsy: Chronic colitis, moderately active. The biopsy shows ulcerated colonic mucosa with abundant chronic inflammatory cells, few or scattered acute inflammatory cells, architectural distortion with glandular dropout and reactive glandular changes. No evidence of dysplasia or malignancy. Recommendations to stop Vedolizumab.discuss with patient options for alternative biologic or small molecule therapy for continued UC treatment. Inflectra Week 0 on 1/20/23. Week 2 on 2/3/23. Week 6 Inflectra 3/3/23.  3/31/23.       Prior Pertinent Surgeries:   none     Complications:   none     Extraintestinal Manifestations:  None      Review of Systems   Constitutional:  Negative for appetite change, chills, fatigue, fever and unexpected weight change.   HENT:  Negative for trouble swallowing.    Respiratory:  Negative for shortness of breath.    Cardiovascular: Negative.  Negative for chest pain.   Gastrointestinal:  Positive for diarrhea. Negative for abdominal pain, blood in stool, change in bowel habit, constipation, nausea, vomiting and reflux.   Musculoskeletal:  Positive for back pain (improved).   Integumentary:  Negative for color change and pallor.   Neurological: Negative.    Hematological: Negative.    Psychiatric/Behavioral: Negative.     All other systems reviewed and are negative.         Medical/Surgical History:   Past Medical History:   Diagnosis Date    Acid reflux     ADD (attention deficit disorder)     Anxiety     Ulcerative colitis      Past Surgical History:   Procedure Laterality Date    BIOPSY  12/15/2022    Procedure: BIOPSY;  Surgeon: Eleonora Philip MD;  Location: [facility] ENDOSCOPY;  Service: Gastroenterology;;    COLONOSCOPY      ESOPHAGOGASTRODUODENOSCOPY Left 12/15/2022    Procedure: EGD (ESOPHAGOGASTRODUODENOSCOPY);  Surgeon: Eleonora Philip MD;  Location: [facility] ENDOSCOPY;  Service: Gastroenterology;  Laterality: Left;    FLEXIBLE SIGMOIDOSCOPY N/A 12/15/2022    Procedure: SIGMOIDOSCOPY, FLEXIBLE;  Surgeon: Eleonora Philip MD;  Location: [facility] ENDOSCOPY;  Service: Gastroenterology;  Laterality: N/A;    SURGICAL REMOVAL OF PILONIDAL CYST       Family History:   Family History   Problem Relation Age of Onset    Diverticulitis Mother     Lupus Maternal Aunt       Social History:   Social History     Socioeconomic History    Marital status: Unknown   Tobacco Use    Smoking status: Former     Current packs/day: 0.50     Types: Cigars, Cigarettes    Smokeless tobacco: Never    Tobacco  comments:     Stopped smoking and dipping   Substance and Sexual Activity    Alcohol use: Not Currently     Comment: socially- rare    Drug use: Yes     Types: Marijuana     Social Determinants of Health     Financial Resource Strain: Low Risk  (12/14/2022)    Overall Financial Resource Strain (CARDIA)     Difficulty of Paying Living Expenses: Not hard at all   Food Insecurity: No Food Insecurity (12/14/2022)    Hunger Vital Sign     Worried About Running Out of Food in the Last Year: Never true     Ran Out of Food in the Last Year: Never true   Transportation Needs: No Transportation Needs (12/14/2022)    PRAPARE - Transportation     Lack of Transportation (Medical): No     Lack of Transportation (Non-Medical): No   Physical Activity: Unknown (12/13/2022)    Exercise Vital Sign     Days of Exercise per Week: 3 days   Stress: Stress Concern Present (12/13/2022)    [place] [employer] - Occupational Stress Questionnaire     Feeling of Stress : To some extent   Social Connections: Unknown (12/14/2022)    Social Connection and Isolation Panel [NHANES]     Frequency of Communication with Friends and Family: More than three times a week     Frequency of Social Gatherings with Friends and Family: More than three times a week     Active Member of Clubs or Organizations: No     Attends Club or Organization Meetings: Never     Marital Status: Never    Housing Stability: Unknown (12/14/2022)    Housing Stability Vital Sign     Unable to Pay for Housing in the Last Year: No     Unstable Housing in the Last Year: No        Review of patient's allergies indicates:  No Known Allergies    Current Medications:   Outpatient Medications Marked as Taking for the 10/2/23 encounter (Office Visit) with Carol Salas FNP   Medication Sig Dispense Refill    EScitalopram oxalate (LEXAPRO) 10 MG tablet       ferrous sulfate (FEOSOL) 325 mg (65 mg iron) Tab tablet Take 1 tablet (325 mg total) by mouth daily  with breakfast. 30 tablet 6    folic acid (FOLVITE) 1 MG tablet Take 1 tablet (1 mg total) by mouth once daily. 30 tablet 11    methotrexate 2.5 MG Tab Take 6 tablets (15 mg total) by mouth every 7 days. 24 tablet 11    multivitamin capsule Take 1 capsule by mouth once daily.      omeprazole (PRILOSEC) 20 MG capsule Take 20 mg by mouth once daily.          Vital Signs:  /63 (BP Location: Left arm, Patient Position: Sitting, BP Method: Large (Automatic))   Pulse 84   Temp 98.2 °F (36.8 °C) (Oral)   Resp 16   Ht 6' (1.829 m)   Wt 89.8 kg (198 lb)   SpO2 99%   BMI 26.85 kg/m²      Physical Exam  Vitals reviewed.   Constitutional:       Appearance: Normal appearance.   HENT:      Head: Normocephalic and atraumatic.      Mouth/Throat:      Mouth: Mucous membranes are moist.   Eyes:      Extraocular Movements: Extraocular movements intact.      Conjunctiva/sclera: Conjunctivae normal.   Cardiovascular:      Rate and Rhythm: Normal rate and regular rhythm.   Pulmonary:      Effort: Pulmonary effort is normal.      Breath sounds: Normal breath sounds.   Abdominal:      General: Bowel sounds are normal.      Palpations: Abdomen is soft.      Tenderness: There is no abdominal tenderness.   Musculoskeletal:      Cervical back: Normal range of motion.   Skin:     General: Skin is warm and dry.      Coloration: Skin is not jaundiced or pale.   Neurological:      General: No focal deficit present.      Mental Status: He is alert and oriented to person, place, and time.   Psychiatric:         Mood and Affect: Mood normal.         Behavior: Behavior normal.         Labs: Reviewed  Hgb   Date Value Ref Range Status   06/09/2023 11.7 (L) 14.0 - 18.0 g/dL Final     Albumin Level   Date Value Ref Range Status   02/24/2023 4.1 3.5 - 5.0 g/dL Final     Iron Level   Date Value Ref Range Status   06/09/2023 394 (H) 65 - 175 ug/dL Final     Ferritin Level   Date Value Ref Range Status   06/09/2023 12.29 (L) 21.81 - 274.66 ng/mL  Final     Folate Level   Date Value Ref Range Status   12/16/2022 7.30 7.0 - 31.4 ng/mL Final     Vitamin B12 Level   Date Value Ref Range Status   06/17/2022 1,496 (H) 213 - 816 pg/mL Final     Vit D 25 OH   Date Value Ref Range Status   06/17/2022 73.6 30.0 - 80.0 ng/mL Final     Zinc   Date Value Ref Range Status   06/17/2022 1.65 (H) 0.66 - 1.10 mcg/mL Final     Comment:        -------------------ADDITIONAL INFORMATION-------------------  This test was developed and its performance characteristics   determined by [facility] in a manner consistent with CLIA   requirements. This test has not been cleared or approved by   the U.S. Food and Drug Administration.     Test Performed by:  [facility] [place] [address]  : Juan Smart M.D. Ph.D.; CLIA# [id number]     C-Reactive Protein   Date Value Ref Range Status   06/09/2023 5.70 (H) <5.00 mg/L Final     Calprotectin, F   Date Value Ref Range Status   06/09/2023 1118 (H) <50.0 (Normal) mcg/g Final     Comment:     Interpretation: Abnormal (>120 mcg/g)     Test Performed by:  [facility] - [place] [id number]  : Juan Smart M.D. Ph.D.; CLIA# [id number]     Hepatitis B Surface Antigen   Date Value Ref Range Status   06/29/2022 Nonreactive Nonreactive Final      Assessment/Plan:    Problem List Items Addressed This Visit          GI    Ulcerative colitis with complication - Primary     [place] endo 2 inflammation on colonoscopy in November 2020  Started Entyvio in March 2021. Received induction and week 14 trough was 12   Changed to every 4 weeks due to ongoing symptoms  Trough 13 in August 2021 on q 4 weeks  Fecal calprotectin 2464-->274 in October 2021 December 2021 Flex sig: Torrez 2 disease in rectosigmoid region, Torrez 0 in sigmoid and descending colon  Start topical therapy with Canasa following flex sig.     Tacrolimus suppositories then given with ongoing symptoms  Not compliant with tacrolimus suppositories  Given steroid taper in June 2022 for continued symptoms.  Fecal calprotectin 831  Continue Entyvio 300mg every 4 weeks for now.  Stop tacrolimus suppositories.  November 2022: Uceris foam BID  Low threshold to re-scope to evaluate for more proximal disease.  Plan close follow-up with repeat flex sig if compliant with therapy to determine if change of therapy indicated  December 2022: Colonoscopy: Inflammation, continuous & circumferential, from anus to transverse colon, graded Torrez Score 3. Normal EGD. Calprotectin 1527; CRP 6.90. Approval for Rinvoq requested. Feraheme x 2 doses completed.   January 2023: Insurance denied Rinvoq. PA for Infliximab approved. Scheduled to begin Infliximab on 1/20/23. On Prednisone taper.   March 2023: Calprotectin 358, Week 6 Trough 8.6. Infusions changed to 5 mg/kg every 4 weeks. He received Ferrlecit 125 mg IV x 3.   June 2023: Calprotectin 1118, CRP 5.70  July 2023: Started increased dose of Inflectra at 5 mg/kg every 4 weeks. Started receiving infusions with [facility].     Clinically improved.  Continue current therapy.  IBD labs, follow fecal calprotectin. Patient will have labs drawn next week.         Relevant Orders    Quantiferon Gold TB    Hepatitis B Surface Antigen    CBC Auto Differential    Comprehensive Metabolic Panel    C-Reactive Protein    Calprotectin, Stool    Iron and TIBC    Ferritin    Folate    Vitamin B12    Vitamin D    Zinc     Other Visit Diagnoses       Immunization due        Recommended vaccines: Tdap, Shingrix #1, Hep A, HepB, PCV20. Pt will contact clinic to schedule appt for vaccines. Declines vaccines today.          HEALTH MAINTENANCE:     Vaccinations:  Influenza (inactive):  recommend annually   PCV 13 (Prevnar): September 22, 2021  PCV 20 (Prevnar 20): recommended 1 dose  PPSV 23 (Pneumovax): never received  Tetanus (TdaP): June 2013,  recommended every 10 years  HPV (males and females ages 11-44 yo): received vaccination, UTD  Meningococcal: vaccinated May 2021  Hepatitis B: not immune, needs repeat series  Hepatitis A:  not immune, needs repeat series  MMR (live vaccine): UTD      Chickenpox status/Varicella (live vaccine):  received vaccination, UTD  Shingrix: recommend   COVID-19: recommend  Lab Results   Component Value Date    HEPBSAB Nonreactive 06/29/2022     Lab Results   Component Value Date    HEPAIGG Nonreactive 11/11/2020     Immunization History   Administered Date(s) Administered    DTaP 2001, 2001, 2001, 11/26/2002, 11/02/2005    HIB 2001, 2001, 2001, 07/30/2002    Hepatitis B, Pediatric/Adolescent 2001, 2001, 2001    IPV 2001, 2001, 2001, 11/02/2005    Influenza - Intranasal - Trivalent 12/18/2009    MMR 02/06/2002, 11/02/2005    Meningococcal Conjugate (MCV4P) 06/11/2013, 05/25/2021    Pneumococcal Conjugate - 13 Valent 09/22/2021    Pneumococcal Conjugate - 7 Valent 2001, 2001, 02/06/2002    Tdap 06/11/2013    Varicella 02/06/2002, 06/13/2008      Colorectal cancer risk:  Risk factors: > 8 years of disease, > 1/3 colon involved  - Distribution of colonic disease: > 1/3 of colon  - Year of symptom onset: 2020  - Colonoscopy for surveillance after endoscopic remission    Ophthalmologic exam recommended yearly  Dermatologic exam recommended yearly due to risk of skin cancer with IBD/meds    Bone health:  Calcium 8980-3843 mg daily and vitamin D 1000 IU daily  Risk factors for osteopenia/osteoporosis: Uceris, UC, prednisone  Vitamin D level- to be drawn     DEXA scan: recommend baseline        Smoking status: Smokes cigars several times/week, marijuana use daily         Follow up: 6 months

## 2023-10-05 NOTE — ASSESSMENT & PLAN NOTE
Torrez endo 2 inflammation on colonoscopy in November 2020  Started Entyvio in March 2021. Received induction and week 14 trough was 12   Changed to every 4 weeks due to ongoing symptoms  Trough 13 in August 2021 on q 4 weeks  Fecal calprotectin 2464-->274 in October 2021 December 2021 Flex sig: Torrez 2 disease in rectosigmoid region, Torrez 0 in sigmoid and descending colon  Start topical therapy with Canasa following flex sig.    Tacrolimus suppositories then given with ongoing symptoms  Not compliant with tacrolimus suppositories  Given steroid taper in June 2022 for continued symptoms.  Fecal calprotectin 831  Continue Entyvio 300mg every 4 weeks for now.  Stop tacrolimus suppositories.  November 2022: Uceris foam BID  Low threshold to re-scope to evaluate for more proximal disease.  Plan close follow-up with repeat flex sig if compliant with therapy to determine if change of therapy indicated  December 2022: Colonoscopy: Inflammation, continuous & circumferential, from anus to transverse colon, graded Torrez Score 3. Normal EGD. Calprotectin 1527; CRP 6.90. Approval for Rinvoq requested. Feraheme x 2 doses completed.   January 2023: Insurance denied Rinvoq. PA for Infliximab approved. Scheduled to begin Infliximab on 1/20/23. On Prednisone taper.   March 2023: Calprotectin 358, Week 6 Trough 8.6. Infusions changed to 5 mg/kg every 4 weeks. He received Ferrlecit 125 mg IV x 3.   June 2023: Calprotectin 1118, CRP 5.70  July 2023: Started increased dose of Inflectra at 5 mg/kg every 4 weeks. Started receiving infusions with [facility].     Clinically improved.  Continue current therapy.  IBD labs, follow fecal calprotectin. Patient will have labs drawn next week.

## 2023-10-13 ENCOUNTER — LAB VISIT (OUTPATIENT)
Dept: LAB | Facility: HOSPITAL | Age: 23
End: 2023-10-13
Attending: INTERNAL MEDICINE
Payer: MEDICAID

## 2023-10-13 DIAGNOSIS — K51.019 ULCERATIVE PANCOLITIS WITH COMPLICATION: ICD-10-CM

## 2023-10-13 PROCEDURE — 83993 ASSAY FOR CALPROTECTIN FECAL: CPT

## 2023-10-16 LAB — CALPROTECTIN STL-MCNT: 98.7 MCG/G

## 2023-10-31 ENCOUNTER — TELEPHONE (OUTPATIENT)
Dept: GASTROENTEROLOGY | Facility: CLINIC | Age: 23
End: 2023-10-31
Payer: MEDICAID

## 2023-10-31 NOTE — TELEPHONE ENCOUNTER
----- Message from DAPHNE Fox sent at 10/31/2023  8:23 AM CDT -----  Fecal calprotectin level decreased from 1118 down to 98.7. Will continue current therapy. Please inform patient.

## 2023-10-31 NOTE — PROGRESS NOTES
Fecal calprotectin level decreased from 1118 down to 98.7. Will continue current therapy. Please inform patient.

## 2023-11-13 ENCOUNTER — TELEPHONE (OUTPATIENT)
Dept: GASTROENTEROLOGY | Facility: CLINIC | Age: 23
End: 2023-11-13
Payer: MEDICAID

## 2023-11-13 NOTE — TELEPHONE ENCOUNTER
Spoke with pt. He had his labs drawn at [facility]. I spoke with Dr. Rhodes's office- pt did not complete any labs for Carol Salas NP.  Spoke with pt again. Advised that he needs to complete labs at [facility]/ Verbalized understanding

## 2024-04-23 ENCOUNTER — OFFICE VISIT (OUTPATIENT)
Dept: GASTROENTEROLOGY | Facility: CLINIC | Age: 24
End: 2024-04-23
Payer: MEDICAID

## 2024-04-23 VITALS
SYSTOLIC BLOOD PRESSURE: 100 MMHG | HEART RATE: 75 BPM | OXYGEN SATURATION: 99 % | BODY MASS INDEX: 30.75 KG/M2 | RESPIRATION RATE: 16 BRPM | DIASTOLIC BLOOD PRESSURE: 66 MMHG | TEMPERATURE: 98 F | WEIGHT: 227 LBS | HEIGHT: 72 IN

## 2024-04-23 DIAGNOSIS — K51.00 ULCERATIVE PANCOLITIS WITHOUT COMPLICATION: Primary | ICD-10-CM

## 2024-04-23 DIAGNOSIS — D50.0 IRON DEFICIENCY ANEMIA DUE TO CHRONIC BLOOD LOSS: ICD-10-CM

## 2024-04-23 LAB
ALBUMIN SERPL-MCNC: 4.2 G/DL (ref 3.5–5)
ALBUMIN/GLOB SERPL: 1.3 RATIO (ref 1.1–2)
ALP SERPL-CCNC: 78 UNIT/L (ref 40–150)
ALT SERPL-CCNC: 29 UNIT/L (ref 0–55)
AST SERPL-CCNC: 19 UNIT/L (ref 5–34)
BASOPHILS # BLD AUTO: 0.05 X10(3)/MCL
BASOPHILS NFR BLD AUTO: 0.6 %
BILIRUB SERPL-MCNC: 0.4 MG/DL
BUN SERPL-MCNC: 9.9 MG/DL (ref 8.9–20.6)
CALCIUM SERPL-MCNC: 9.7 MG/DL (ref 8.4–10.2)
CHLORIDE SERPL-SCNC: 107 MMOL/L (ref 98–107)
CO2 SERPL-SCNC: 26 MMOL/L (ref 22–29)
CREAT SERPL-MCNC: 0.84 MG/DL (ref 0.73–1.18)
CRP SERPL-MCNC: <1 MG/L
DEPRECATED CALCIDIOL+CALCIFEROL SERPL-MC: 26.6 NG/ML (ref 30–80)
EOSINOPHIL # BLD AUTO: 0.2 X10(3)/MCL (ref 0–0.9)
EOSINOPHIL NFR BLD AUTO: 2.4 %
ERYTHROCYTE [DISTWIDTH] IN BLOOD BY AUTOMATED COUNT: 14.9 % (ref 11.5–17)
FERRITIN SERPL-MCNC: 6.43 NG/ML (ref 21.81–274.66)
FOLATE SERPL-MCNC: 10.5 NG/ML (ref 7–31.4)
GFR SERPLBLD CREATININE-BSD FMLA CKD-EPI: >60 MLS/MIN/1.73/M2
GLOBULIN SER-MCNC: 3.3 GM/DL (ref 2.4–3.5)
GLUCOSE SERPL-MCNC: 81 MG/DL (ref 74–100)
HBV CORE AB SERPL QL IA: NONREACTIVE
HBV SURFACE AG SERPL QL IA: NONREACTIVE
HCT VFR BLD AUTO: 44.4 % (ref 42–52)
HCV AB SERPL QL IA: NONREACTIVE
HGB BLD-MCNC: 14.3 G/DL (ref 14–18)
IMM GRANULOCYTES # BLD AUTO: 0.03 X10(3)/MCL (ref 0–0.04)
IMM GRANULOCYTES NFR BLD AUTO: 0.4 %
IRON SATN MFR SERPL: 24 % (ref 20–50)
IRON SERPL-MCNC: 103 UG/DL (ref 65–175)
LYMPHOCYTES # BLD AUTO: 3.18 X10(3)/MCL (ref 0.6–4.6)
LYMPHOCYTES NFR BLD AUTO: 38.5 %
MCH RBC QN AUTO: 27.8 PG (ref 27–31)
MCHC RBC AUTO-ENTMCNC: 32.2 G/DL (ref 33–36)
MCV RBC AUTO: 86.4 FL (ref 80–94)
MONOCYTES # BLD AUTO: 0.63 X10(3)/MCL (ref 0.1–1.3)
MONOCYTES NFR BLD AUTO: 7.6 %
NEUTROPHILS # BLD AUTO: 4.17 X10(3)/MCL (ref 2.1–9.2)
NEUTROPHILS NFR BLD AUTO: 50.5 %
NRBC BLD AUTO-RTO: 0 %
PLATELET # BLD AUTO: 379 X10(3)/MCL (ref 130–400)
PMV BLD AUTO: 10.8 FL (ref 7.4–10.4)
POTASSIUM SERPL-SCNC: 4.1 MMOL/L (ref 3.5–5.1)
PROT SERPL-MCNC: 7.5 GM/DL (ref 6.4–8.3)
RBC # BLD AUTO: 5.14 X10(6)/MCL (ref 4.7–6.1)
SODIUM SERPL-SCNC: 140 MMOL/L (ref 136–145)
TIBC SERPL-MCNC: 329 UG/DL (ref 69–240)
TIBC SERPL-MCNC: 432 UG/DL (ref 250–450)
TRANSFERRIN SERPL-MCNC: 392 MG/DL (ref 174–364)
VIT B12 SERPL-MCNC: 699 PG/ML (ref 213–816)
WBC # SPEC AUTO: 8.26 X10(3)/MCL (ref 4.5–11.5)

## 2024-04-23 PROCEDURE — 85025 COMPLETE CBC W/AUTO DIFF WBC: CPT | Performed by: INTERNAL MEDICINE

## 2024-04-23 PROCEDURE — 36415 COLL VENOUS BLD VENIPUNCTURE: CPT | Performed by: INTERNAL MEDICINE

## 2024-04-23 PROCEDURE — 82607 VITAMIN B-12: CPT | Performed by: INTERNAL MEDICINE

## 2024-04-23 PROCEDURE — 82306 VITAMIN D 25 HYDROXY: CPT | Performed by: INTERNAL MEDICINE

## 2024-04-23 PROCEDURE — 99214 OFFICE O/P EST MOD 30 MIN: CPT | Mod: PBBFAC | Performed by: INTERNAL MEDICINE

## 2024-04-23 PROCEDURE — 86140 C-REACTIVE PROTEIN: CPT | Performed by: INTERNAL MEDICINE

## 2024-04-23 PROCEDURE — 86480 TB TEST CELL IMMUN MEASURE: CPT | Performed by: INTERNAL MEDICINE

## 2024-04-23 PROCEDURE — 86803 HEPATITIS C AB TEST: CPT | Performed by: INTERNAL MEDICINE

## 2024-04-23 PROCEDURE — 80053 COMPREHEN METABOLIC PANEL: CPT | Performed by: INTERNAL MEDICINE

## 2024-04-23 PROCEDURE — 84630 ASSAY OF ZINC: CPT | Performed by: INTERNAL MEDICINE

## 2024-04-23 PROCEDURE — 82728 ASSAY OF FERRITIN: CPT | Performed by: INTERNAL MEDICINE

## 2024-04-23 PROCEDURE — 87340 HEPATITIS B SURFACE AG IA: CPT | Performed by: INTERNAL MEDICINE

## 2024-04-23 PROCEDURE — 82746 ASSAY OF FOLIC ACID SERUM: CPT | Performed by: INTERNAL MEDICINE

## 2024-04-23 PROCEDURE — 83550 IRON BINDING TEST: CPT | Performed by: INTERNAL MEDICINE

## 2024-04-23 PROCEDURE — 86704 HEP B CORE ANTIBODY TOTAL: CPT | Performed by: INTERNAL MEDICINE

## 2024-04-23 PROCEDURE — 83540 ASSAY OF IRON: CPT | Performed by: INTERNAL MEDICINE

## 2024-04-23 RX ORDER — CLONAZEPAM 0.5 MG/1
TABLET ORAL
COMMUNITY
Start: 2024-04-15

## 2024-04-23 NOTE — PROGRESS NOTES
Inflammatory Bowel Disease        Established Patient Note         TODAY'S VISIT DATE:  4/23/2024  The patient's last visit with me was on 7/26/2022.     PCP: Madhu Goodson      Referring MD:   No ref. provider found    History of Present Illness:    Mr. Gutierrez is a 24 year old male with ulcerative colitis on Infliximab 5mg/kg every 4 weeks + methotrexate 15 mg weekly diagnosed in November 2020 here for follow-up.    His symptoms started in 2019 with rectal bleeding, diarrhea with associated urgency, tenesmus, and lower abdominal cramping. His weight was stable.     Colonoscopy in November 2020 showed Torrez endo Score 2 inflammation (moderate, with marked erythema, absent vascular pattern, friability, erosions) from the rectum to the hepatic flexure. Normal TI, cecum, ascending colon. Biopsies showed moderate to marked chronic active colitis with cryptitis, crypt abscesses, epithelial erosion, increased chronic inflammation and architectural distortion. Right colon biopsies showed mild inflammation. He was started on Apriso 1.5 gm daily and canasa.    Received Feraheme x 2 in December 2020.     On follow-up in February 2021 he reported continued symptoms despite apriso 1.5 gm and uceris 9mg daily.   I stopped apriso and started vedolizumab monotherapy.    He had his induction doses on 03/09/21, 03/24/21, and 04/21/21. His week 14 trough was 12 in June 2021. He was changed to every 4 weeks due to ongoing symptoms and trough was 13 in August 2021 on q 4 weeks.    Initially he noticed less blood and frequency after his first 2 infusions but since then had continued symptoms of bleeding, urgency, diarrhea Infectious stool studies were negative. Fecal calprotectin was 2464 was in August 2021. He was given Uceris then prednisone taper.     Fecal calprotectin was 274 in October 2021. 831 in June 2022.  CRP 5.6  6/22.    December 13, 2021: Flex Sig: Moderately active (Torrez score 2)  ulcerative colitis in rectum and distal sigmoid colon.  Inactive (Torrez Score 0) ulcerative colitis in proximal sigmoid colon and distal descending colon.  He was started on topical rectal therapy - canasa initially then transitioned to tacrolimus suppositories when canasa was not effective.      He was initiated on a Prednisone taper in June 2022 after reported worsening UC related symptoms.  Labs drawn at that time significant for worsening anemia and iron studies with H&H 8.4/28.7 on 6/17/22 (from from 9.4/31.2 on 11/29/21); iron level 7, iron saturation 2.  June 2022: Fecal calprotectin 831.  Patient was essentially non-compliant with his tacrolimus suppositories despite known active disease of rectum and distal colon.  We discussed the need for compliance with topical therapy with consideration of endoscopy to determine if evidence of more active disease proximally.    When seen on 11/15/22, he was not using suppositories due to some burning with Bms following use.  He was having 7-8 loose stools a day bright red blood intermittently in his stool, not with every stool but with most.  He was having tenesmus, urgency and some nocturnal stools at times. Taking diclofenac for his back pain along with baclofen. Hgb 7.5 g/dL in October (checked by ED for back pain).  Alb 3.6.  Not on oral iron.     He was hospitalized in December 2022 for symptomatic anemia, Hgb 6.1 g/dL on admission.     December 2023: Flex sig: Torrez endo 3 to the transverse colon  Given prednisone taper and switched to infliximab.     December 2022: Calprotectin 1527; CRP 6.90.   March 2023: Calprotectin 358, Week 6 Trough 8.6. Infusions changed to 5 mg/kg every 4 weeks. He received Ferrlecit 125 mg IV x 3.      June 2023: Calprotectin 1118, CRP 5.70  July 2023: Started increased dose of Inflectra at 5 mg/kg every 4 weeks. Started receiving infusions with [facility]    When last seen in October 2023 he was doing well clinically.  October 2023: Fecal  calprotectin: 98.7    He is having 4-5 loose to formed stools a day.  Urgency and tenesmus have imrpoved.  No nocturnal stools.  He has had 4-5 episodes streaks of blood in the stool but no mucous and no mira hematochezia.  He reports some mid and lower abdominal cramping and pain.  No known triggers and resolves spontaneously.  Appetite is good and weight is stable.  No joint pains, rashes, or eye problems other than vision.     He was having difficulty getting methotrexate filled with the pharmacy.  He has now been off it for 6 months. He stopped ferrous sulfate.     He takes omeprazole 20 mg for reflux/regurgitation and belching.  The medication can be helpful.     He denies regular NSAID use. He smokes 1/4 ppd of cigarettes. He smokes marijuana daily. No alcohol use. He denies a family history of IBD, colon polyps, or colon cancer.    IBD History:  IBD disease: UC  Disease location: pancolitis      Current IBD Therapy:  Infliximab (Inflectra) 5 mg/kg every 4 weeks + methotrexate 15 mg weekly (January 2023- present) (home infusions)    Therapeutic Drug Monitoring Labs:  March 2023: Week 6 IFX trough 8.6.    Prior IBD Therapies:  Apriso 1.5 gm daily  Canasa 1 gm nightly  uceris 9mg daily  Prednisone  Entyvio 300 mg every 4 weeks (March 2021-December 2022), secondary loss of response      Pertinent Endoscopy/Imaging:  November 11, 2020: Colonoscopy: Torrez Score 2 inflammation (moderate, with marked erythema, absent vascular pattern, friability, erosions) from the rectum to the hepatic flexure. Normal TI, cecum, ascending colon. Biopsies showed moderate to marked chronic active colitis with cryptitis, crypt abscesses, epithelial erosion, increased chronic inflammation and architectural distortion. Right colon biopsies showed mild inflammation.     December 13, 2021: Flex Sig: Moderately active (Torrez score 2) ulcerative colitis in rectum and distal sigmoid colon, unchanged since last examination.  Inactive (Torrez  Score 0) ulcerative colitis in proximal sigmoid colon and distal descending colon, improved since last exam    December 15, 2022: EGD    December 15, 2022: Flex sig:       Prior Pertinent Surgeries:   none    Complications:   none    Extraintestinal Manifestations:  None    Review of Systems   Constitutional:  Positive for fatigue and unexpected weight change. Negative for appetite change.   HENT:  Negative for trouble swallowing.    Respiratory:  Positive for shortness of breath.    Cardiovascular: Negative.    Gastrointestinal:  Positive for abdominal pain, blood in stool, diarrhea and nausea. Negative for change in bowel habit and constipation.   Musculoskeletal:  Positive for back pain.   Neurological: Negative.    Hematological: Negative.    Psychiatric/Behavioral: Negative.     All other systems reviewed and are negative.         Medical/Surgical History:   Past Medical History:   Diagnosis Date    Acid reflux     ADD (attention deficit disorder)     Anxiety     Back pain     Ulcerative colitis      Past Surgical History:   Procedure Laterality Date    back injection      BIOPSY  12/15/2022    Procedure: BIOPSY;  Surgeon: Eleonora Philip MD;  Location: [facility] ENDOSCOPY;  Service: Gastroenterology;;    COLONOSCOPY      ESOPHAGOGASTRODUODENOSCOPY Left 12/15/2022    Procedure: EGD (ESOPHAGOGASTRODUODENOSCOPY);  Surgeon: Eleonora Philip MD;  Location: [facility] ENDOSCOPY;  Service: Gastroenterology;  Laterality: Left;    FLEXIBLE SIGMOIDOSCOPY N/A 12/15/2022    Procedure: SIGMOIDOSCOPY, FLEXIBLE;  Surgeon: Eleonora Philip MD;  Location: [facility] ENDOSCOPY;  Service: Gastroenterology;  Laterality: N/A;    SURGICAL REMOVAL OF PILONIDAL CYST           Family History:   Family History   Problem Relation Name Age of Onset    Diverticulitis Mother      Lupus Maternal Aunt          Social History:   Social History     Socioeconomic History    Marital status: Unknown   Tobacco Use    Smoking status: Some Days      Current packs/day: 0.25     Types: Cigars, Cigarettes    Smokeless tobacco: Never    Tobacco comments:     Smoking on occasion. Stopped dipping   Substance and Sexual Activity    Alcohol use: Not Currently     Comment: socially- rare    Drug use: Yes     Types: Marijuana     Social Determinants of Health     Financial Resource Strain: Low Risk  (12/14/2022)    Overall Financial Resource Strain (CARDIA)     Difficulty of Paying Living Expenses: Not hard at all   Food Insecurity: No Food Insecurity (12/14/2022)    Hunger Vital Sign     Worried About Running Out of Food in the Last Year: Never true     Ran Out of Food in the Last Year: Never true   Transportation Needs: No Transportation Needs (12/14/2022)    PRAPARE - Transportation     Lack of Transportation (Medical): No     Lack of Transportation (Non-Medical): No   Physical Activity: Unknown (12/13/2022)    Exercise Vital Sign     Days of Exercise per Week: 3 days   Stress: Stress Concern Present (12/13/2022)    [employer] - Occupational Stress Questionnaire     Feeling of Stress : To some extent   Social Connections: Unknown (12/14/2022)    Social Connection and Isolation Panel [NHANES]     Frequency of Communication with Friends and Family: More than three times a week     Frequency of Social Gatherings with Friends and Family: More than three times a week     Active Member of Clubs or Organizations: No     Attends Club or Organization Meetings: Never     Marital Status: Never    Housing Stability: Unknown (12/14/2022)    Housing Stability Vital Sign     Unable to Pay for Housing in the Last Year: No     Unstable Housing in the Last Year: No        Review of patient's allergies indicates:  No Known Allergies    Current Medications:   Current Outpatient Medications   Medication Sig Dispense Refill    clonazePAM (KLONOPIN) 0.5 MG tablet Take by mouth.      omeprazole (PRILOSEC) 20 MG capsule Take 20 mg by mouth once daily.       EScitalopram oxalate (LEXAPRO) 10 MG tablet  (Patient not taking: Reported on 4/23/2024)      ferrous sulfate (FEOSOL) 325 mg (65 mg iron) Tab tablet Take 1 tablet (325 mg total) by mouth daily with breakfast. (Patient not taking: Reported on 4/23/2024) 30 tablet 6    folic acid (FOLVITE) 1 MG tablet Take 1 tablet (1 mg total) by mouth once daily. (Patient not taking: Reported on 4/23/2024) 30 tablet 11    methotrexate 2.5 MG Tab Take 6 tablets (15 mg total) by mouth every 7 days. (Patient not taking: Reported on 4/23/2024) 24 tablet 11    multivitamin capsule Take 1 capsule by mouth once daily. (Patient not taking: Reported on 4/23/2024)       No current facility-administered medications for this visit.     Facility-Administered Medications Ordered in Other Visits   Medication Dose Route Frequency Provider Last Rate Last Admin    sodium chloride 0.9% flush 10 mL  10 mL Intravenous PRN Eleonora Philip MD   10 mL at 12/29/22 1340        Vital Signs:  /66 (BP Location: Right arm, Patient Position: Sitting, BP Method: Medium (Automatic))   Pulse 75   Temp 98.2 °F (36.8 °C) (Oral)   Resp 16   Ht 6' (1.829 m)   Wt 103 kg (227 lb)   SpO2 99%   BMI 30.79 kg/m²      Physical Exam  Vitals reviewed.   Constitutional:       Appearance: Normal appearance.   HENT:      Head: Normocephalic and atraumatic.      Mouth/Throat:      Mouth: Mucous membranes are moist.   Eyes:      Extraocular Movements: Extraocular movements intact.      Conjunctiva/sclera: Conjunctivae normal.   Cardiovascular:      Rate and Rhythm: Normal rate and regular rhythm.   Pulmonary:      Effort: Pulmonary effort is normal.      Breath sounds: Normal breath sounds.   Abdominal:      General: Bowel sounds are normal.      Palpations: Abdomen is soft.      Tenderness: There is no abdominal tenderness.   Musculoskeletal:      Cervical back: Normal range of motion.   Skin:     General: Skin is warm and dry.      Coloration: Skin is pale.    Neurological:      General: No focal deficit present.      Mental Status: He is alert and oriented to person, place, and time.   Psychiatric:         Mood and Affect: Mood normal.         Behavior: Behavior normal.         Labs: Reviewed  Hgb   Date Value Ref Range Status   06/09/2023 11.7 (L) 14.0 - 18.0 g/dL Final     Albumin Level   Date Value Ref Range Status   02/24/2023 4.1 3.5 - 5.0 g/dL Final     Iron Level   Date Value Ref Range Status   06/09/2023 394 (H) 65 - 175 ug/dL Final     Ferritin Level   Date Value Ref Range Status   06/09/2023 12.29 (L) 21.81 - 274.66 ng/mL Final     Folate Level   Date Value Ref Range Status   12/16/2022 7.30 7.0 - 31.4 ng/mL Final     Vitamin B12 Level   Date Value Ref Range Status   06/17/2022 1,496 (H) 213 - 816 pg/mL Final     Vit D 25 OH   Date Value Ref Range Status   06/17/2022 73.6 30.0 - 80.0 ng/mL Final     Zinc   Date Value Ref Range Status   06/17/2022 1.65 (H) 0.66 - 1.10 mcg/mL Final     Comment:        -------------------ADDITIONAL INFORMATION-------------------  This test was developed and its performance characteristics   determined by [facility] in a manner consistent with CLIA   requirements. This test has not been cleared or approved by   the U.S. Food and Drug Administration.     Test Performed by:  [facility] - [place] [id number]  : Juan Smart M.D. Ph.D.; CLIA# [id number]     C-Reactive Protein   Date Value Ref Range Status   06/09/2023 5.70 (H) <5.00 mg/L Final     Hepatitis B Surface Antigen   Date Value Ref Range Status   06/29/2022 Nonreactive Nonreactive Final      Quantiferon gold: Negative - June 2022  Fecal calprotectin: 274 (October 2021) --> 831 (June 2022)      Assessment/Plan:    Problem List Items Addressed This Visit          GI    Ulcerative pancolitis without complication - Primary                 HEALTH MAINTENANCE:      Vaccinations:     Influenza  (inactive):  recommended annually   PCV 13 (Prevnar): September 22, 2021  PCV 20 (prevnar): Due 2026  Tetanus (TdaP): June 2013, recommended every 10 years  HPV (males and females ages 11-44 yo): received vaccination, UTD  Meningococcal: vaccinated May 2021  Hepatitis B: not immune  Hepatitis A:  not immune  MMR (live vaccine): UTD      Chickenpox status/Varicella (live vaccine):  received vaccination, UTD  Shingrix: recommend   COVID-19: recommend     Colorectal cancer risk:  Risk factors: > 8 years of disease, > 1/3 colon involved  - Distribution of colonic disease: > 1/3 of colon  - Year of symptom onset: 2020  - Colonoscopy for surveillance after endoscopic remission    Ophthalmologic exam recommended yearly - sees Dr. Harini Barnard.   Dermatologic exam recommended yearly due to risk of skin cancer with IBD/meds    Bone health:  Calcium 8238-5309 mg daily and vitamin D 1000 IU daily  Risk factors for osteopenia/osteoporosis: Uceris, UC, prednisone  Vitamin D: 73.6     DEXA scan: recommend baseline        Smoking status: Smokes cigars several times/week, marijuana use daily       Follow up: Colonoscopy in January 2023               times/week, marijuana use daily       Follow up: 6 months

## 2024-04-24 ENCOUNTER — PATIENT MESSAGE (OUTPATIENT)
Dept: GASTROENTEROLOGY | Facility: CLINIC | Age: 24
End: 2024-04-24
Payer: MEDICAID

## 2024-04-24 DIAGNOSIS — D50.0 IRON DEFICIENCY ANEMIA DUE TO CHRONIC BLOOD LOSS: ICD-10-CM

## 2024-04-24 DIAGNOSIS — K51.00 ULCERATIVE PANCOLITIS WITHOUT COMPLICATION: Primary | ICD-10-CM

## 2024-04-24 RX ORDER — POLYETHYLENE GLYCOL 3350, SODIUM SULFATE, SODIUM CHLORIDE, POTASSIUM CHLORIDE, SODIUM ASCORBATE, AND ASCORBIC ACID 7.5-2.691G
KIT ORAL
Qty: 1 KIT | Refills: 0 | Status: SHIPPED | OUTPATIENT
Start: 2024-04-24 | End: 2024-06-10 | Stop reason: HOSPADM

## 2024-04-25 LAB
GAMMA INTERFERON BACKGROUND BLD IA-ACNC: 0.01 IU/ML
M TB IFN-G BLD-IMP: NEGATIVE
M TB IFN-G CD4+ BCKGRND COR BLD-ACNC: 0.01 IU/ML
M TB IFN-G CD4+CD8+ BCKGRND COR BLD-ACNC: 0.02 IU/ML
MITOGEN IGNF BCKGRD COR BLD-ACNC: 8.37 IU/ML

## 2024-04-26 LAB — AR ZINC, SERUM/PLASMA: 102.6 UG/DL

## 2024-05-06 ENCOUNTER — PATIENT MESSAGE (OUTPATIENT)
Dept: GASTROENTEROLOGY | Facility: CLINIC | Age: 24
End: 2024-05-06
Payer: MEDICAID

## 2024-05-06 ENCOUNTER — APPOINTMENT (OUTPATIENT)
Dept: LAB | Facility: HOSPITAL | Age: 24
End: 2024-05-06
Attending: INTERNAL MEDICINE
Payer: MEDICAID

## 2024-05-08 LAB — CALPROTECTIN STL-MCNT: 76.3 MCG/G

## 2024-05-13 ENCOUNTER — TELEPHONE (OUTPATIENT)
Dept: GASTROENTEROLOGY | Facility: CLINIC | Age: 24
End: 2024-05-13
Payer: MEDICAID

## 2024-05-13 NOTE — TELEPHONE ENCOUNTER
----- Message from Mara Ritter sent at 5/13/2024  2:40 PM CDT -----  Tess called from [facility] called requesting pt lab order be faxed to [phone]    Attn: Tess    Please call Tess once labs orders are faxed @ [phone]

## 2024-05-23 ENCOUNTER — TELEPHONE (OUTPATIENT)
Dept: GASTROENTEROLOGY | Facility: CLINIC | Age: 24
End: 2024-05-23
Payer: MEDICAID

## 2024-05-23 NOTE — TELEPHONE ENCOUNTER
Called PT.  Nurse with [facility] did not draw labs prior to infusion.  He stated that she did not have the appropriate equipment to draw his labs.  Call into option for answers.

## 2024-05-28 RX ORDER — POLYETHYLENE GLYCOL 3350, SODIUM SULFATE, SODIUM CHLORIDE, POTASSIUM CHLORIDE, SODIUM ASCORBATE, AND ASCORBIC ACID 7.5-2.691G
2000 KIT ORAL ONCE
Qty: 1 KIT | Refills: 0 | Status: SHIPPED | OUTPATIENT
Start: 2024-05-28 | End: 2024-05-28

## 2024-06-05 ENCOUNTER — TELEPHONE (OUTPATIENT)
Dept: GASTROENTEROLOGY | Facility: CLINIC | Age: 24
End: 2024-06-05
Payer: MEDICAID

## 2024-06-05 NOTE — TELEPHONE ENCOUNTER
Explained to pt why [facility] was unable to draw trough level. Verbalized understanding. Pt also indicates he has been dealing with a bump on his left testicle. He is on his second round of antibiotics, without any improvement. Advised him to contact PCP re: Urology referral. Verbalized understanding.

## 2024-06-06 ENCOUNTER — ANESTHESIA EVENT (OUTPATIENT)
Dept: ENDOSCOPY | Facility: HOSPITAL | Age: 24
End: 2024-06-06
Payer: MEDICAID

## 2024-06-06 RX ORDER — SODIUM CHLORIDE, SODIUM LACTATE, POTASSIUM CHLORIDE, CALCIUM CHLORIDE 600; 310; 30; 20 MG/100ML; MG/100ML; MG/100ML; MG/100ML
INJECTION, SOLUTION INTRAVENOUS CONTINUOUS
Status: CANCELLED | OUTPATIENT
Start: 2024-06-06

## 2024-06-06 NOTE — ANESTHESIA PREPROCEDURE EVALUATION
"                                                                                                             06/06/2024  Saji Gutierrez is a 24 y.o., male for [facility]      Vitals:    06/10/24 1220 06/10/24 1233   BP: 120/68 120/68   BP Location: Left arm    Patient Position: Lying    Pulse: (!) 50    Resp: 16    Temp: 36.4 °C (97.6 °F)    TempSrc: Oral    SpO2: 98%    Weight: 97.2 kg (214 lb 3.2 oz)    Height: 6' 1" (1.854 m)        Active Ambulatory Problems     Diagnosis Date Noted    Ulcerative pancolitis without complication 04/05/2022    Iron deficiency anemia due to chronic blood loss 07/26/2022    Symptomatic anemia 12/14/2022     Resolved Ambulatory Problems     Diagnosis Date Noted    No Resolved Ambulatory Problems     Past Medical History:   Diagnosis Date    Acid reflux     ADD (attention deficit disorder)     Anxiety     Back pain     Ulcerative colitis        Past Surgical History:   Procedure Laterality Date    back injection      BIOPSY  12/15/2022    Procedure: BIOPSY;  Surgeon: Eleonora Philip MD;  Location: [facility] ENDOSCOPY;  Service: Gastroenterology;;    COLONOSCOPY      ESOPHAGOGASTRODUODENOSCOPY Left 12/15/2022    Procedure: EGD (ESOPHAGOGASTRODUODENOSCOPY);  Surgeon: Eleonora Philip MD;  Location: [facility] ENDOSCOPY;  Service: Gastroenterology;  Laterality: Left;    FLEXIBLE SIGMOIDOSCOPY N/A 12/15/2022    Procedure: SIGMOIDOSCOPY, FLEXIBLE;  Surgeon: Eleonora Philip MD;  Location: [facility] ENDOSCOPY;  Service: Gastroenterology;  Laterality: N/A;    SURGICAL REMOVAL OF PILONIDAL CYST         Scheduled Meds:  Continuous Infusions:  PRN Meds:.    Lab Results   Component Value Date    WBC 8.26 04/23/2024    HGB 14.3 04/23/2024    HCT 44.4 04/23/2024     04/23/2024    ALT 29 04/23/2024    AST 19 04/23/2024     04/23/2024    K 4.1 04/23/2024     04/23/2024    CREATININE 0.84 04/23/2024    BUN 9.9 04/23/2024    CO2 26 04/23/2024    INR 1.07 09/21/2020       Scheduled " Meds:  Continuous Infusions:  PRN Meds:.    Current Facility-Administered Medications on File Prior to Encounter   Medication Dose Route Frequency Provider Last Rate Last Admin    sodium chloride 0.9% flush 10 mL  10 mL Intravenous PRN Eleonora Philip MD   10 mL at 12/29/22 1340     Current Outpatient Medications on File Prior to Encounter   Medication Sig Dispense Refill    clonazePAM (KLONOPIN) 0.5 MG tablet Take by mouth.      ferrous sulfate (FEOSOL) 325 mg (65 mg iron) Tab tablet Take 1 tablet (325 mg total) by mouth daily with breakfast. 30 tablet 6    omeprazole (PRILOSEC) 20 MG capsule Take 20 mg by mouth once daily.      folic acid (FOLVITE) 1 MG tablet Take 1 tablet (1 mg total) by mouth once daily. (Patient not taking: Reported on 4/23/2024) 30 tablet 11    methotrexate 2.5 MG Tab Take 6 tablets (15 mg total) by mouth every 7 days. (Patient not taking: Reported on 4/23/2024) 24 tablet 11    multivitamin capsule Take 1 capsule by mouth once daily. (Patient not taking: Reported on 4/23/2024)             Pre-op Assessment    I have reviewed the Patient Summary Reports.     I have reviewed the Nursing Notes. I have reviewed the NPO Status.   I have reviewed the Medications.     Review of Systems  Anesthesia Hx:  No problems with previous Anesthesia   History of prior surgery of interest to airway management or planning:          Denies Family Hx of Anesthesia complications.    Denies Personal Hx of Anesthesia complications.                    Hematology/Oncology:  Hematology Normal   Oncology Normal                                   EENT/Dental:  EENT/Dental Normal           Cardiovascular:  Cardiovascular Normal                                            Pulmonary:  Pulmonary Normal                       Renal/:  Renal/ Normal                 Hepatic/GI:  Hepatic/GI Normal                 Musculoskeletal:  Musculoskeletal Normal                Neurological:  Neurology Normal                                       Endocrine:  Endocrine Normal            Dermatological:  Skin Normal    Psych:  Psychiatric Normal                    Physical Exam  General: Well nourished, Cooperative, Alert and Oriented    Airway:  Mallampati: I / I  Mouth Opening: Normal  TM Distance: Normal  Tongue: Normal  Neck ROM: Normal ROM    Dental:  Intact        Anesthesia Plan  Type of Anesthesia, risks & benefits discussed:    Anesthesia Type: Gen Natural Airway  Intra-op Monitoring Plan: Standard ASA Monitors  Post Op Pain Control Plan: IV/PO Opioids PRN  (medical reason for not using multimodal pain management)  Induction:  IV  Informed Consent: Informed consent signed with the Patient and all parties understand the risks and agree with anesthesia plan.  All questions answered. Patient consented to blood products? No  ASA Score: 2  Day of Surgery Review of History & Physical: H&P Update referred to the surgeon/provider.    Ready For Surgery From Anesthesia Perspective.     .

## 2024-06-10 ENCOUNTER — PATIENT MESSAGE (OUTPATIENT)
Dept: ADMINISTRATIVE | Facility: OTHER | Age: 24
End: 2024-06-10
Payer: MEDICAID

## 2024-06-10 ENCOUNTER — ANESTHESIA (OUTPATIENT)
Dept: ENDOSCOPY | Facility: HOSPITAL | Age: 24
End: 2024-06-10
Payer: MEDICAID

## 2024-06-10 ENCOUNTER — HOSPITAL ENCOUNTER (OUTPATIENT)
Facility: HOSPITAL | Age: 24
Discharge: HOME | End: 2024-06-10
Attending: INTERNAL MEDICINE | Admitting: INTERNAL MEDICINE
Payer: MEDICAID

## 2024-06-10 VITALS
SYSTOLIC BLOOD PRESSURE: 116 MMHG | DIASTOLIC BLOOD PRESSURE: 64 MMHG | OXYGEN SATURATION: 98 % | HEART RATE: 69 BPM | TEMPERATURE: 98 F | RESPIRATION RATE: 16 BRPM | WEIGHT: 214.19 LBS | HEIGHT: 73 IN | BODY MASS INDEX: 28.39 KG/M2

## 2024-06-10 DIAGNOSIS — D50.0 IRON DEFICIENCY ANEMIA DUE TO CHRONIC BLOOD LOSS: Primary | ICD-10-CM

## 2024-06-10 DIAGNOSIS — K51.00 ULCERATIVE PANCOLITIS WITHOUT COMPLICATION: ICD-10-CM

## 2024-06-10 PROCEDURE — 37000008 HC ANESTHESIA 1ST 15 MINUTES: Performed by: INTERNAL MEDICINE

## 2024-06-10 PROCEDURE — 63600175 PHARM REV CODE 636 W HCPCS: Performed by: NURSE ANESTHETIST, CERTIFIED REGISTERED

## 2024-06-10 PROCEDURE — 63600175 PHARM REV CODE 636 W HCPCS: Performed by: SPECIALIST

## 2024-06-10 PROCEDURE — 27201423 OPTIME MED/SURG SUP & DEVICES STERILE SUPPLY: Performed by: INTERNAL MEDICINE

## 2024-06-10 PROCEDURE — 25000003 PHARM REV CODE 250: Performed by: NURSE ANESTHETIST, CERTIFIED REGISTERED

## 2024-06-10 PROCEDURE — 45385 COLONOSCOPY W/LESION REMOVAL: CPT | Performed by: INTERNAL MEDICINE

## 2024-06-10 PROCEDURE — 88305 TISSUE EXAM BY PATHOLOGIST: CPT | Mod: TC | Performed by: INTERNAL MEDICINE

## 2024-06-10 PROCEDURE — D9220A PRA ANESTHESIA: Mod: ,,, | Performed by: NURSE ANESTHETIST, CERTIFIED REGISTERED

## 2024-06-10 PROCEDURE — 37000009 HC ANESTHESIA EA ADD 15 MINS: Performed by: INTERNAL MEDICINE

## 2024-06-10 RX ORDER — SODIUM CHLORIDE, SODIUM LACTATE, POTASSIUM CHLORIDE, CALCIUM CHLORIDE 600; 310; 30; 20 MG/100ML; MG/100ML; MG/100ML; MG/100ML
INJECTION, SOLUTION INTRAVENOUS CONTINUOUS
Status: DISCONTINUED | OUTPATIENT
Start: 2024-06-10 | End: 2024-06-10 | Stop reason: HOSPADM

## 2024-06-10 RX ORDER — PROPOFOL 10 MG/ML
VIAL (ML) INTRAVENOUS
Status: DISCONTINUED | OUTPATIENT
Start: 2024-06-10 | End: 2024-06-10

## 2024-06-10 RX ORDER — LIDOCAINE HYDROCHLORIDE 10 MG/ML
1 INJECTION, SOLUTION EPIDURAL; INFILTRATION; INTRACAUDAL; PERINEURAL ONCE
OUTPATIENT
Start: 2024-06-10 | End: 2024-06-10

## 2024-06-10 RX ORDER — LIDOCAINE HYDROCHLORIDE 20 MG/ML
INJECTION, SOLUTION EPIDURAL; INFILTRATION; INTRACAUDAL; PERINEURAL
Status: DISCONTINUED | OUTPATIENT
Start: 2024-06-10 | End: 2024-06-10

## 2024-06-10 RX ADMIN — PROPOFOL 50 MG: 10 INJECTION, EMULSION INTRAVENOUS at 02:06

## 2024-06-10 RX ADMIN — SODIUM CHLORIDE, POTASSIUM CHLORIDE, SODIUM LACTATE AND CALCIUM CHLORIDE: 600; 310; 30; 20 INJECTION, SOLUTION INTRAVENOUS at 02:06

## 2024-06-10 RX ADMIN — SODIUM CHLORIDE, POTASSIUM CHLORIDE, SODIUM LACTATE AND CALCIUM CHLORIDE: 600; 310; 30; 20 INJECTION, SOLUTION INTRAVENOUS at 12:06

## 2024-06-10 RX ADMIN — PROPOFOL 80 MG: 10 INJECTION, EMULSION INTRAVENOUS at 02:06

## 2024-06-10 RX ADMIN — PROPOFOL 50 MG: 10 INJECTION, EMULSION INTRAVENOUS at 03:06

## 2024-06-10 RX ADMIN — PROPOFOL 130 MG: 10 INJECTION, EMULSION INTRAVENOUS at 02:06

## 2024-06-10 RX ADMIN — PROPOFOL 40 MG: 10 INJECTION, EMULSION INTRAVENOUS at 02:06

## 2024-06-10 RX ADMIN — LIDOCAINE HYDROCHLORIDE 100 MG: 20 INJECTION, SOLUTION EPIDURAL; INFILTRATION; INTRACAUDAL; PERINEURAL at 02:06

## 2024-06-10 NOTE — TRANSFER OF CARE
"Anesthesia Transfer of Care Note    Patient: Saji Gutierrez    Procedure(s) Performed: Procedure(s) (LRB):  COLONOSCOPY, WITH 1 OR MORE BIOPSIES (N/A)  COLONOSCOPY, WITH POLYPECTOMY USING SNARE    Patient location: PACU    Anesthesia Type: general    Post pain: adequate analgesia    Post assessment: no apparent anesthetic complications    Post vital signs: stable    Level of consciousness: sedated    Nausea/Vomiting: no nausea/vomiting    Complications: none    Transfer of care protocol was followed      Last vitals: Visit Vitals  BP (!) 112/51   Pulse (!) 54   Temp 36.4 °C (97.6 °F) (Oral)   Resp 16   Ht 6' 1" (1.854 m)   Wt 97.2 kg (214 lb 3.2 oz)   SpO2 99%   BMI 28.26 kg/m²     "

## 2024-06-10 NOTE — H&P
Colonoscopy History and Physical    Patient Name: Saji Gutierrez  MRN: [id number]  : 2000  Date of Procedure:  6/10/2024  Referring Physician: Eleonora Philip MD  Primary Physician: Madhu Goodson FNP  Procedure Physician: Eleonora Bradshaw MD    Procedure - Colonoscopy   ASA - per anesthesia  Mallampati - per anesthesia  History of Anesthesia problems - no  Family history Anesthesia problems -  no   Plan of anesthesia - General    Diagnosis: Ulcerative colitis  Chief Complaint: Same as above    HPI:   25 y/o M with ulcerative colitis diagnosed on  on Infliximab and Methotrexate every 4 weeks presenting for a colonoscopy. He has been compliant with his infusions and denies abdominal / rectal pain, hematochezia/melena. He does endorse a 3-week hx of decreased appetite, but otherwise no significant changes to his health recently.        Last colonoscopy: Never  Family history: Mother: Diverticulitis, Lupus  Anticoagulation: None    ROS:  Constitutional: No fevers, chills, No weight loss  CV: No chest pain  Pulm: No cough, No shortness of breath  GI: see HPI    Medical History:   Past Medical History:   Diagnosis Date    Acid reflux     ADD (attention deficit disorder)     Anxiety     Back pain     Ulcerative colitis          Surgical History:   Past Surgical History:   Procedure Laterality Date    back injection      BIOPSY  12/15/2022    Procedure: BIOPSY;  Surgeon: Eleonora Philip MD;  Location: [facility] ENDOSCOPY;  Service: Gastroenterology;;    COLONOSCOPY      ESOPHAGOGASTRODUODENOSCOPY Left 12/15/2022    Procedure: EGD (ESOPHAGOGASTRODUODENOSCOPY);  Surgeon: Eleonora Philip MD;  Location: [facility] ENDOSCOPY;  Service: Gastroenterology;  Laterality: Left;    FLEXIBLE SIGMOIDOSCOPY N/A 12/15/2022    Procedure: SIGMOIDOSCOPY, FLEXIBLE;  Surgeon: Eleonora Philip MD;  Location: [facility] ENDOSCOPY;  Service: Gastroenterology;  Laterality: N/A;    SURGICAL REMOVAL OF PILONIDAL CYST          Family History:   Family History   Problem Relation Name Age of Onset    Diverticulitis Mother      Lupus Mother      Lupus Maternal Aunt      Bone cancer Maternal Aunt     .    Social History:   Social History     Socioeconomic History    Marital status: Unknown   Tobacco Use    Smoking status: Some Days     Current packs/day: 0.25     Average packs/day: 0.3 packs/day for 12.4 years (3.1 ttl pk-yrs)     Types: Cigarettes     Start date: 2012    Smokeless tobacco: Never    Tobacco comments:     Smoking on occasion. Stopped dipping   Substance and Sexual Activity    Alcohol use: Not Currently     Alcohol/week: 3.0 standard drinks of alcohol     Types: 3 Cans of beer per week     Comment: 2-3/ WEEKLY    Drug use: Yes     Types: Marijuana     Comment: DAILY. last used yesterday am    Sexual activity: Yes     Partners: Female     Social Determinants of Health     Financial Resource Strain: Low Risk  (12/14/2022)    Overall Financial Resource Strain (CARDIA)     Difficulty of Paying Living Expenses: Not hard at all   Food Insecurity: No Food Insecurity (12/14/2022)    Hunger Vital Sign     Worried About Running Out of Food in the Last Year: Never true     Ran Out of Food in the Last Year: Never true   Transportation Needs: No Transportation Needs (12/14/2022)    PRAPARE - Transportation     Lack of Transportation (Medical): No     Lack of Transportation (Non-Medical): No   Physical Activity: Unknown (12/13/2022)    Exercise Vital Sign     Days of Exercise per Week: 3 days   Stress: Stress Concern Present (12/13/2022)    [employer] - Occupational Stress Questionnaire     Feeling of Stress : To some extent   Housing Stability: Unknown (12/14/2022)    Housing Stability Vital Sign     Unable to Pay for Housing in the Last Year: No     Unstable Housing in the Last Year: No       Review of patient's allergies indicates:  No Known Allergies    Medications:   Medications Prior to Admission  "  Medication Sig Dispense Refill Last Dose    omeprazole (PRILOSEC) 20 MG capsule Take 20 mg by mouth once daily.   6/9/2024    polyethylene glycol (MOVIPREP) 100-7.5-2.691 gram solution Take as directed prior to colonoscopy 1 kit 0 6/10/2024    clonazePAM (KLONOPIN) 0.5 MG tablet Take by mouth. (Patient not taking: Reported on 6/10/2024)   Unknown    ferrous sulfate (FEOSOL) 325 mg (65 mg iron) Tab tablet Take 1 tablet (325 mg total) by mouth daily with breakfast. (Patient not taking: Reported on 6/10/2024) 30 tablet 6 Unknown    folic acid (FOLVITE) 1 MG tablet Take 1 tablet (1 mg total) by mouth once daily. (Patient not taking: Reported on 4/23/2024) 30 tablet 11 Not Taking    methotrexate 2.5 MG Tab Take 6 tablets (15 mg total) by mouth every 7 days. (Patient not taking: Reported on 4/23/2024) 24 tablet 11     multivitamin capsule Take 1 capsule by mouth once daily. (Patient not taking: Reported on 4/23/2024)   Unknown         Physical Exam:    Vital Signs:   Vitals:    06/10/24 1233   BP: 120/68   Pulse:    Resp:    Temp:      /68   Pulse (!) 50   Temp 97.6 °F (36.4 °C) (Oral)   Resp 16   Ht 6' 1" (1.854 m)   Wt 97.2 kg (214 lb 3.2 oz)   SpO2 98%   BMI 28.26 kg/m²     General: NAD  Lungs: NWOB on RA. Symmetric chest rise and fall  Heart: RR  Abdomen: Soft, NT, ND        Labs:  Lab Results   Component Value Date    WBC 8.26 04/23/2024    HGB 14.3 04/23/2024    HCT 44.4 04/23/2024    MCV 86.4 04/23/2024     04/23/2024     Lab Results   Component Value Date    INR 1.07 09/21/2020    APTT 30.2 09/21/2020     Lab Results   Component Value Date     04/23/2024    K 4.1 04/23/2024    CO2 26 04/23/2024    BUN 9.9 04/23/2024    CREATININE 0.84 04/23/2024    LABPROT 7.5 04/23/2024    ALBUMIN 4.2 04/23/2024    BILITOT 0.4 04/23/2024    ALKPHOS 78 04/23/2024    ALT 29 04/23/2024    AST 19 04/23/2024       Assessment and Plan:   25 y/o M with ulcerative colitis diagnosed on 12/22 on Infliximab and " Methotrexate every 4 weeks presenting for a colonoscopy    - r/b/a of procedure discussed with patient and consent obtained  - proceed with colonoscopy    Alphonse Cantrell MD  [employer] General Surgery, PGY2

## 2024-06-10 NOTE — PROVATION PATIENT INSTRUCTIONS
Discharge Summary/Instructions after an Endoscopic Procedure  Patient Name: Saji Gutierrez  Patient MRN: [id number]  Patient YOB: 2000  Monday, Radha 10, 2024  Eleonora Philip MD  Dear patient,  As a result of recent federal legislation (The Federal Cures Act), you may   receive lab or pathology results from your procedure in your [facility]   account before your physician is able to contact you. Your physician or   their representative will relay the results to you with their   recommendations at their soonest availability.  Thank you,  RESTRICTIONS:  During your procedure today, you received medications for sedation.  These   medications may affect your judgment, balance and coordination.  Therefore,   for 24 hours, you have the following restrictions:   - DO NOT drive a car, operate machinery, make legal/financial decisions,   sign important papers or drink alcohol.    ACTIVITY:  Today: no heavy lifting, straining or running due to procedural   sedation/anesthesia.  The following day: return to full activity including work.  DIET:  Eat and drink normally unless instructed otherwise.     TREATMENT FOR COMMON SIDE EFFECTS:  - Mild abdominal pain, nausea, belching, bloating or excessive gas:  rest,   eat lightly and use a heating pad.  - Sore Throat: treat with throat lozenges and/or gargle with warm salt   water.  - Because air was used during the procedure, expelling large amounts of air   from your rectum or belching is normal.  - If a bowel prep was taken, you may not have a bowel movement for 1-3 days.    This is normal.  SYMPTOMS TO WATCH FOR AND REPORT TO YOUR PHYSICIAN:  1. Abdominal pain or bloating, other than gas cramps.  2. Chest pain.  3. Back pain.  4. Signs of infection such as: chills or fever occurring within 24 hours   after the procedure.  5. Rectal bleeding, which would show as bright red, maroon, or black stools.   (A tablespoon of blood from the rectum is not serious, especially  if   hemorrhoids are present.)  6. Vomiting.  7. Weakness or dizziness.  GO DIRECTLY TO THE NEAREST EMERGENCY ROOM IF YOU HAVE ANY OF THE FOLLOWING:      Difficulty breathing              Chills and/or fever over 101 F   Persistent vomiting and/or vomiting blood   Severe abdominal pain   Severe chest pain   Black, tarry stools   Bleeding- more than one tablespoon   Any other symptom or condition that you feel may need urgent attention  Your doctor recommends these additional instructions:  If any biopsies were taken, your doctors clinic will contact you in 1 to 2   weeks with any results.  - Patient has a contact number available for emergencies.  The signs and   symptoms of potential delayed complications were discussed with the   patient.  Return to normal activities tomorrow.  Written discharge   instructions were provided to the patient.   - Discharge patient to home.   - Resume previous diet.   - Continue present medications.   - Await pathology results.   - Repeat colonoscopy is recommended for surveillance.  The colonoscopy date   will be determined after pathology results from today's exam become   available for review.   - Return to GI clinic in 6 months with fecal calprotectin.  For questions, problems or results please call your physician - Eleonora Philip MD at Work:  [phone], Work:  [phone].  [facility] , EMERGENCY ROOM PHONE NUMBER: [phone]  IF A COMPLICATION OR EMERGENCY SITUATION ARISES AND YOU ARE UNABLE TO REACH   YOUR PHYSICIAN - GO DIRECTLY TO THE EMERGENCY ROOM.  Eleonora Philip MD  6/10/2024 4:18:47 PM  This report has been verified and signed electronically.  Dear patient,  As a result of recent federal legislation (The Federal Cures Act), you may   receive lab or pathology results from your procedure in your [facility]   account before your physician is able to contact you. Your physician or   their representative will relay the results to you  with their   recommendations at their soonest availability.  Thank you,  PROVATION

## 2024-06-10 NOTE — PLAN OF CARE
Pt is alert but drowsy. Follows commands. Bp 108/62, HR-46.mother is at the bedside. Pt moves all extremities.

## 2024-06-10 NOTE — ANESTHESIA POSTPROCEDURE EVALUATION
Anesthesia Post Evaluation    Patient: Saji Gutierrez    Procedure(s) Performed: Procedure(s) (LRB):  COLONOSCOPY, WITH 1 OR MORE BIOPSIES (N/A)  COLONOSCOPY, WITH POLYPECTOMY USING SNARE    Final Anesthesia Type: general      Patient location during evaluation: GI PACU  Patient participation: Yes- Able to Participate  Level of consciousness: awake and alert  Post-procedure vital signs: reviewed and stable  Pain management: adequate  Airway patency: patent    PONV status at discharge: No PONV  Anesthetic complications: no      Cardiovascular status: hemodynamically stable  Respiratory status: spontaneous ventilation and room air  Hydration status: euvolemic  Follow-up not needed.              Vitals Value Taken Time   /51 06/10/24 1520   Temp 36.4 °C (97.6 °F) 06/10/24 1220   Pulse 54 06/10/24 1520   Resp 16 06/10/24 1220   SpO2 99 % 06/10/24 1520         No case tracking events are documented in the log.      Pain/Oly Score: No data recorded

## 2024-06-13 LAB
ESTROGEN SERPL-MCNC: NORMAL PG/ML
INSULIN SERPL-ACNC: NORMAL U[IU]/ML
LAB AP CLINICAL INFORMATION: NORMAL
LAB AP GROSS DESCRIPTION: NORMAL
LAB AP REPORT FOOTNOTES: NORMAL
T3RU NFR SERPL: NORMAL %

## 2024-06-18 ENCOUNTER — HOSPITAL ENCOUNTER (OUTPATIENT)
Dept: RADIOLOGY | Facility: HOSPITAL | Age: 24
Discharge: HOME | End: 2024-06-18
Attending: INTERNAL MEDICINE
Payer: MEDICAID

## 2024-06-18 DIAGNOSIS — D50.0 IRON DEFICIENCY ANEMIA DUE TO CHRONIC BLOOD LOSS: ICD-10-CM

## 2024-06-18 DIAGNOSIS — K51.00 ULCERATIVE PANCOLITIS WITHOUT COMPLICATION: ICD-10-CM

## 2024-06-18 PROCEDURE — 76870 US EXAM SCROTUM: CPT | Mod: TC

## 2024-06-24 NOTE — TELEPHONE ENCOUNTER
----- Message from Rachel Blanco sent at 2/27/2023  1:07 PM CST -----  Regarding: Medication Management  Pt's Mom Tess called in regards to Pt. Mom stated that Pt Iron levels are really low and would like to know what should she do about it? Please advise. Pt also has been sleeping a whole lot. Please advise [phone] (Tess)    Thank You     [facility] Employee Diabetes Program - Live Well With Diabetes    Quarterly Check-in    Congratulations! You have completed the following requirements needed to maintain enrollment in the Live Well With Diabetes Management program for 2024:        Meet with a [facility] Clinical Pharmacist at least once yearly  Visit with your physician (first yearly visit)    Please review the information below for further requirements that need to be completed for the remainder of the year.    To ensure participants are taking steps to control their condition ongoing requirements need to be completed. To continue to receive the Live Well With Diabetes Program benefits, the following actions must be taken:     Requirements to be completed by July 1  First A1C    Requirements to be completed by Dec. 31  Visit with your physician (Second yearly visit)  Second A1C  Lipid panel (once yearly)  Urine albumin (once yearly)    Requirements if A1C is greater than 8 percent:  Complete diabetes education or engage with an [employer] care manager.    *Documentation of any requirements completed or reviewed outside of the [facility] electronic medical record will need to be faxed or emailed (fax/email info below).    If the missing requirements(s) are not met by the date listed above, you will be disqualified from the program. If any patient is dis-enrolled from the program, then they must wait a full calendar year to re-enroll in the program.     Called and spoke with patient. Reports that she has the A1c order, but was waiting to complete due to recently being on steroids, and also because she has another OV in late July where she will have bloodwork done.  Was hoping to avoid another lab charge.  Agreed to push back 7/1 deadline until the end of July.      Quarterly check in [employer] sent also    [facility]   Phone: [phone] Option #3  Email:

## 2024-06-25 ENCOUNTER — TELEPHONE (OUTPATIENT)
Dept: GASTROENTEROLOGY | Facility: CLINIC | Age: 24
End: 2024-06-25
Payer: MEDICAID

## 2024-06-25 DIAGNOSIS — N50.3 EPIDIDYMAL CYST: Primary | ICD-10-CM

## 2024-06-25 DIAGNOSIS — N43.3 HYDROCELE, UNSPECIFIED HYDROCELE TYPE: ICD-10-CM

## 2024-06-25 NOTE — TELEPHONE ENCOUNTER
Spoke with Tess. Advised results have not yet been reviewed by Dr. Philip. Verbalized understanding. Please advise

## 2024-06-25 NOTE — TELEPHONE ENCOUNTER
----- Message from Rachel Blanco sent at 6/25/2024  8:47 AM CDT -----  Regarding: Please advise  Pt's Mom Tess called for Ultra [employer] and Biopsy results. She can be reached at [phone] or [phone]    Thank You

## 2024-06-26 RX ORDER — ERGOCALCIFEROL 1.25 MG/1
50000 CAPSULE ORAL
Qty: 12 CAPSULE | Refills: 1 | Status: SHIPPED | OUTPATIENT
Start: 2024-06-26 | End: 2024-12-05

## 2024-06-27 ENCOUNTER — TELEPHONE (OUTPATIENT)
Dept: GASTROENTEROLOGY | Facility: CLINIC | Age: 24
End: 2024-06-27
Payer: MEDICAID

## 2024-06-27 DIAGNOSIS — R19.7 DIARRHEA, UNSPECIFIED TYPE: ICD-10-CM

## 2024-06-27 DIAGNOSIS — K51.00 ULCERATIVE PANCOLITIS WITHOUT COMPLICATION: Primary | ICD-10-CM

## 2024-06-27 NOTE — TELEPHONE ENCOUNTER
"Spoke with Tess. States, " I just wanted to let you know Saji told me yesterday he started bleeding. He said he is having to go more frequently." I told her I will reach out to Saji for more details. Verbalized understanding.    Spoke with pt. He indicates increased frequency of BM's- up to 5x/d. Admits to bleeding 4x since last week. States, " It's either liquid or blood." Denies abd pain. Admits to N/V upon awakening. Advised pt we will check stool studies to rule out infection. Verbalized understanding.          "

## 2024-06-27 NOTE — TELEPHONE ENCOUNTER
----- Message from Jyoti Sorto sent at 6/27/2024 10:04 AM CDT -----  Regarding: follow up call  Tess Matt left message with answering service ( 6/26 @ 4:21 pm )to speak to Shasta. She is following up on conversation from yesterday and needing to let Shasta know that something has changed .    [phone]

## 2024-07-08 ENCOUNTER — PATIENT MESSAGE (OUTPATIENT)
Dept: GASTROENTEROLOGY | Facility: CLINIC | Age: 24
End: 2024-07-08
Payer: MEDICAID

## 2024-07-08 NOTE — TELEPHONE ENCOUNTER
With normal fecal casey and recent colonoscopy that looked good, could just be hemorrhoids especially if no more bleeding.  Agree with recs for fiber and miralax and monitor for now.

## 2024-07-22 ENCOUNTER — TELEPHONE (OUTPATIENT)
Dept: GASTROENTEROLOGY | Facility: CLINIC | Age: 24
End: 2024-07-22
Payer: MEDICAID

## 2024-07-22 DIAGNOSIS — N50.3 EPIDIDYMAL CYST: Primary | ICD-10-CM

## 2024-07-22 DIAGNOSIS — N43.3 HYDROCELE, UNSPECIFIED HYDROCELE TYPE: ICD-10-CM

## 2024-07-22 NOTE — TELEPHONE ENCOUNTER
Call to pt re:denied Urology referral. Advised pt that Dr. Lo denied his referral. Pt requests referral be sent to Urology @ [facility].

## 2024-07-24 NOTE — ASSESSMENT & PLAN NOTE
Torrez endo 2 inflammation on colonoscopy in November 2020  Started Entyvio in March 2021. Received induction and week 14 trough was 12   Changed to every 4 weeks due to ongoing symptoms  Trough 13 in August 2021 on q 4 weeks  December 2021 Flex sig: Torrez 2 disease in rectosigmoid region, Torrez 0 in sigmoid and descending colon  Started topical therapy with canasa, tacrolimus, then Uceris foam with intermittent compliance and no improvement.    Given steroid taper in June 2022 for continued symptoms.  Fecal calprotectin 831  December 2022: Colonoscopy: Torrez Score 3 from rectum to transverse.  December 2022: Fecal calprotectin 1527; CRP 6.90    Infliximab 5 mg/kg + methotrexate started January 2023 (after Rinvoq denial)  Week 6 trough 8.6 - infusions increased to 5 mg/kg every 4 weeks  March 2023: fecal calprotectin 358   June 2023: Fecal calprotectin 1118, CRP 5.70  July 2023: Started increased dose of Inflectra at 5 mg/kg every 4 weeks.   October 2023: Fecal calprotectin: 98.7    Clinically improved.  Off methotrexate since Nov 2023  Continue current therapy - infliximab 5 mg/kg every 4 weeks  Recheck trough.  Schedule colonoscopy given improvement in fecal calprotectin

## 2024-07-29 ENCOUNTER — TELEPHONE (OUTPATIENT)
Dept: GASTROENTEROLOGY | Facility: CLINIC | Age: 24
End: 2024-07-29
Payer: MEDICAID

## 2024-07-29 NOTE — TELEPHONE ENCOUNTER
----- Message from Mara Ritter sent at 7/29/2024 11:13 AM CDT -----  Pt mother called requesting a call back from Shasta about pt.       Tess: [phone] or [phone]

## 2024-07-29 NOTE — TELEPHONE ENCOUNTER
Spoke to Mom.  She wants referral to Dr. Arce in [place].      Jade checked and this MD is a nephrologist. Called Mom back and she stated she will check to be sure this is the correct one.  She will call back.

## 2024-09-05 ENCOUNTER — OFFICE VISIT (OUTPATIENT)
Dept: UROLOGY | Facility: CLINIC | Age: 24
End: 2024-09-05
Payer: MEDICAID

## 2024-09-05 VITALS
TEMPERATURE: 98 F | BODY MASS INDEX: 29.21 KG/M2 | WEIGHT: 220.38 LBS | RESPIRATION RATE: 20 BRPM | DIASTOLIC BLOOD PRESSURE: 64 MMHG | OXYGEN SATURATION: 98 % | HEIGHT: 73 IN | HEART RATE: 54 BPM | SYSTOLIC BLOOD PRESSURE: 108 MMHG

## 2024-09-05 DIAGNOSIS — N50.3 EPIDIDYMAL CYST: ICD-10-CM

## 2024-09-05 DIAGNOSIS — N43.3 HYDROCELE, UNSPECIFIED HYDROCELE TYPE: ICD-10-CM

## 2024-09-05 DIAGNOSIS — N50.82 SCROTAL PAIN: Primary | ICD-10-CM

## 2024-09-05 PROCEDURE — 3008F BODY MASS INDEX DOCD: CPT | Mod: CPTII,,, | Performed by: UROLOGY

## 2024-09-05 PROCEDURE — 99204 OFFICE O/P NEW MOD 45 MIN: CPT | Mod: S$PBB,,, | Performed by: UROLOGY

## 2024-09-05 PROCEDURE — 1159F MED LIST DOCD IN RCRD: CPT | Mod: CPTII,,, | Performed by: UROLOGY

## 2024-09-05 PROCEDURE — 3074F SYST BP LT 130 MM HG: CPT | Mod: CPTII,,, | Performed by: UROLOGY

## 2024-09-05 PROCEDURE — 3078F DIAST BP <80 MM HG: CPT | Mod: CPTII,,, | Performed by: UROLOGY

## 2024-09-05 PROCEDURE — 99214 OFFICE O/P EST MOD 30 MIN: CPT | Mod: PBBFAC | Performed by: UROLOGY

## 2024-09-05 PROCEDURE — 1160F RVW MEDS BY RX/DR IN RCRD: CPT | Mod: CPTII,,, | Performed by: UROLOGY

## 2024-09-05 RX ORDER — ESCITALOPRAM OXALATE 10 MG/1
10 TABLET ORAL DAILY
COMMUNITY

## 2024-09-05 RX ORDER — FAMOTIDINE 40 MG/1
40 TABLET, FILM COATED ORAL 2 TIMES DAILY PRN
COMMUNITY
Start: 2024-06-11

## 2024-09-05 NOTE — PROGRESS NOTES
CC:  Scrotal pain    HPI:  Saji Gutierrez is a 24 y.o. male being seen in consultation for scrotal pain.  He has had left sided scrotal pain intermittently for about four months.  It lasts for about ten to 15 minutes and is fairly severe.  If he tries to move the scrotum during the episode it is painful.  The longest it has lasted was one hour.  He usually has to sit down and quit what he is doing when it occurs.    He also complains of anejaculation intermittently.  He has no other symptoms.  He does have problems with his low back and has had three injections.  He also has a history of ulcerative colitis.    Urinalysis:  Unable to give a urine sample today.      Imaging:  Scrotal ultrasound - 18 June 2024:  Small bilateral hydroceles  Left-sided epididymal cysts measuring up to 0.8 cm     Data Review:  Note from referring provider, Eleonora Philip MD dated 10 Radha 2024.  Ultrasound..     ROS:  All systems reviewed and are negative except as documented in HPI and/or Assessment and Plan.     Patient Active Problem List:     Patient Active Problem List   Diagnosis    Ulcerative pancolitis without complication    Iron deficiency anemia due to chronic blood loss    Symptomatic anemia        Past Medical History:  Past Medical History:   Diagnosis Date    Acid reflux     ADD (attention deficit disorder)     Anxiety     Back pain     Ulcerative colitis         Past Surgical History:  Past Surgical History:   Procedure Laterality Date    back injection      BIOPSY  12/15/2022    Procedure: BIOPSY;  Surgeon: Eleonora Philip MD;  Location: [facility] ENDOSCOPY;  Service: Gastroenterology;;    COLONOSCOPY      COLONOSCOPY, WITH 1 OR MORE BIOPSIES N/A 6/10/2024    Procedure: COLONOSCOPY, WITH 1 OR MORE BIOPSIES;  Surgeon: Eleonora Philip MD;  Location: [facility] ENDOSCOPY;  Service: Gastroenterology;  Laterality: N/A;    COLONOSCOPY, WITH POLYPECTOMY USING SNARE  6/10/2024    Procedure: COLONOSCOPY, WITH POLYPECTOMY  USING SNARE;  Surgeon: Eleonora Philip MD;  Location: [facility] ENDOSCOPY;  Service: Gastroenterology;;    ESOPHAGOGASTRODUODENOSCOPY Left 12/15/2022    Procedure: EGD (ESOPHAGOGASTRODUODENOSCOPY);  Surgeon: Eleonora Philip MD;  Location: [facility] ENDOSCOPY;  Service: Gastroenterology;  Laterality: Left;    FLEXIBLE SIGMOIDOSCOPY N/A 12/15/2022    Procedure: SIGMOIDOSCOPY, FLEXIBLE;  Surgeon: Eleonora Philip MD;  Location: [facility] ENDOSCOPY;  Service: Gastroenterology;  Laterality: N/A;    SURGICAL REMOVAL OF PILONIDAL CYST          Family History:  Family History   Problem Relation Name Age of Onset    Diverticulitis Mother      Lupus Mother      Lupus Maternal Aunt      Bone cancer Maternal Aunt          Social History:  Social History     Socioeconomic History    Marital status: Unknown   Tobacco Use    Smoking status: Every Day     Current packs/day: 0.25     Average packs/day: 0.3 packs/day for 12.7 years (3.2 ttl pk-yrs)     Types: Cigarettes     Start date: 2012    Smokeless tobacco: Former    Tobacco comments:     1 cig per day   Substance and Sexual Activity    Alcohol use: Yes     Alcohol/week: 3.0 standard drinks of alcohol     Types: 3 Cans of beer per week     Comment: occasionally    Drug use: Yes     Frequency: 7.0 times per week     Types: Marijuana     Comment: daily    Sexual activity: Yes     Partners: Female     Social Determinants of Health     Financial Resource Strain: Low Risk  (12/14/2022)    Overall Financial Resource Strain (CARDIA)     Difficulty of Paying Living Expenses: Not hard at all   Food Insecurity: No Food Insecurity (12/14/2022)    Hunger Vital Sign     Worried About Running Out of Food in the Last Year: Never true     Ran Out of Food in the Last Year: Never true   Transportation Needs: No Transportation Needs (12/14/2022)    PRAPARE - Transportation     Lack of Transportation (Medical): No     Lack of Transportation (Non-Medical): No   Physical Activity: Unknown  (12/13/2022)    Exercise Vital Sign     Days of Exercise per Week: 3 days   Stress: Stress Concern Present (12/13/2022)    [employer] - Occupational Stress Questionnaire     Feeling of Stress : To some extent   Housing Stability: Unknown (12/14/2022)    Housing Stability Vital Sign     Unable to Pay for Housing in the Last Year: No     Unstable Housing in the Last Year: No        Allergies:  Review of patient's allergies indicates:  No Known Allergies     Objective:  Vitals:    09/05/24 1307   BP: 108/64   Pulse: (!) 54   Resp: 20   Temp: 98.2 °F (36.8 °C)     General:  Well developed, well nourished adult male in no acute distress  Abdomen: Soft, nontender, no masses  Extremities:  No clubbing, cyanosis, or edema  Neurologic:  Grossly intact  Musculoskeletal:  Normal tone  Penis:  Circumcised, no lesions  Scrotum:  No hydroceles are appreciated clinically.  Testicles:  Nontender, no masses  Epididymis:  Normal without masses.  I can not palpate any epididymal cysts.      Assessment:  1. Scrotal pain    2. Epididymal cyst  - Ambulatory referral/consult to Urology    3. Hydrocele, unspecified hydrocele type  - Ambulatory referral/consult to Urology     Plan:  I am unsure of the etiology of his scrotal pain.  I talked with him at length about intermittent testicular torsion and instructed him to go to the ER if the pain does not resolve.  If this persists will consider testicular fixation.   The epididymal cysts are not clinically apparent and are not causing any symptoms.   The hydroceles are not clinically apparent and are not causing any symptoms.

## 2024-09-05 NOTE — PROGRESS NOTES
Pt seen by Dr. Hussein; Pt instructed to return to clinic as needed; Discharge paperwork given w/pt verbalizing understanding

## 2024-11-12 ENCOUNTER — OFFICE VISIT (OUTPATIENT)
Dept: GASTROENTEROLOGY | Facility: CLINIC | Age: 24
End: 2024-11-12
Payer: MEDICAID

## 2024-11-12 VITALS
RESPIRATION RATE: 16 BRPM | DIASTOLIC BLOOD PRESSURE: 75 MMHG | BODY MASS INDEX: 30.09 KG/M2 | WEIGHT: 227 LBS | HEIGHT: 73 IN | SYSTOLIC BLOOD PRESSURE: 116 MMHG | TEMPERATURE: 98 F | OXYGEN SATURATION: 97 % | HEART RATE: 61 BPM

## 2024-11-12 DIAGNOSIS — K51.00 ULCERATIVE PANCOLITIS WITHOUT COMPLICATION: Primary | ICD-10-CM

## 2024-11-12 LAB
25(OH)D3+25(OH)D2 SERPL-MCNC: 35 NG/ML (ref 30–80)
ALBUMIN SERPL-MCNC: 4.4 G/DL (ref 3.5–5)
ALBUMIN/GLOB SERPL: 1.4 RATIO (ref 1.1–2)
ALP SERPL-CCNC: 88 UNIT/L (ref 40–150)
ALT SERPL-CCNC: 20 UNIT/L (ref 0–55)
ANION GAP SERPL CALC-SCNC: 8 MEQ/L
AST SERPL-CCNC: 18 UNIT/L (ref 5–34)
BASOPHILS # BLD AUTO: 0.07 X10(3)/MCL
BASOPHILS NFR BLD AUTO: 0.7 %
BILIRUB SERPL-MCNC: 0.3 MG/DL
BUN SERPL-MCNC: 12.2 MG/DL (ref 8.9–20.6)
CALCIUM SERPL-MCNC: 9.4 MG/DL (ref 8.4–10.2)
CHLORIDE SERPL-SCNC: 113 MMOL/L (ref 98–107)
CO2 SERPL-SCNC: 18 MMOL/L (ref 22–29)
CREAT SERPL-MCNC: 0.81 MG/DL (ref 0.72–1.25)
CREAT/UREA NIT SERPL: 15
CRP SERPL-MCNC: <1 MG/L
EOSINOPHIL # BLD AUTO: 0.21 X10(3)/MCL (ref 0–0.9)
EOSINOPHIL NFR BLD AUTO: 2.2 %
ERYTHROCYTE [DISTWIDTH] IN BLOOD BY AUTOMATED COUNT: 13.8 % (ref 11.5–17)
FERRITIN SERPL-MCNC: 11.28 NG/ML (ref 21.81–274.66)
GFR SERPLBLD CREATININE-BSD FMLA CKD-EPI: >60 ML/MIN/1.73/M2
GLOBULIN SER-MCNC: 3.2 GM/DL (ref 2.4–3.5)
GLUCOSE SERPL-MCNC: 94 MG/DL (ref 74–100)
HCT VFR BLD AUTO: 47.2 % (ref 42–52)
HGB BLD-MCNC: 15.9 G/DL (ref 14–18)
IMM GRANULOCYTES # BLD AUTO: 0.01 X10(3)/MCL (ref 0–0.04)
IMM GRANULOCYTES NFR BLD AUTO: 0.1 %
LYMPHOCYTES # BLD AUTO: 3.88 X10(3)/MCL (ref 0.6–4.6)
LYMPHOCYTES NFR BLD AUTO: 40 %
MCH RBC QN AUTO: 29.5 PG (ref 27–31)
MCHC RBC AUTO-ENTMCNC: 33.7 G/DL (ref 33–36)
MCV RBC AUTO: 87.6 FL (ref 80–94)
MONOCYTES # BLD AUTO: 0.6 X10(3)/MCL (ref 0.1–1.3)
MONOCYTES NFR BLD AUTO: 6.2 %
NEUTROPHILS # BLD AUTO: 4.93 X10(3)/MCL (ref 2.1–9.2)
NEUTROPHILS NFR BLD AUTO: 50.8 %
NRBC BLD AUTO-RTO: 0 %
PLATELET # BLD AUTO: 317 X10(3)/MCL (ref 130–400)
PMV BLD AUTO: 11 FL (ref 7.4–10.4)
POTASSIUM SERPL-SCNC: 4.1 MMOL/L (ref 3.5–5.1)
PROT SERPL-MCNC: 7.6 GM/DL (ref 6.4–8.3)
RBC # BLD AUTO: 5.39 X10(6)/MCL (ref 4.7–6.1)
SODIUM SERPL-SCNC: 139 MMOL/L (ref 136–145)
WBC # BLD AUTO: 9.7 X10(3)/MCL (ref 4.5–11.5)

## 2024-11-12 PROCEDURE — 82728 ASSAY OF FERRITIN: CPT | Performed by: INTERNAL MEDICINE

## 2024-11-12 PROCEDURE — 86140 C-REACTIVE PROTEIN: CPT | Performed by: INTERNAL MEDICINE

## 2024-11-12 PROCEDURE — 90715 TDAP VACCINE 7 YRS/> IM: CPT | Mod: PBBFAC

## 2024-11-12 PROCEDURE — 99213 OFFICE O/P EST LOW 20 MIN: CPT | Mod: PBBFAC | Performed by: INTERNAL MEDICINE

## 2024-11-12 PROCEDURE — 36415 COLL VENOUS BLD VENIPUNCTURE: CPT | Performed by: INTERNAL MEDICINE

## 2024-11-12 PROCEDURE — 80053 COMPREHEN METABOLIC PANEL: CPT | Performed by: INTERNAL MEDICINE

## 2024-11-12 PROCEDURE — 82306 VITAMIN D 25 HYDROXY: CPT | Performed by: INTERNAL MEDICINE

## 2024-11-12 PROCEDURE — 84630 ASSAY OF ZINC: CPT | Performed by: INTERNAL MEDICINE

## 2024-11-12 PROCEDURE — 85025 COMPLETE CBC W/AUTO DIFF WBC: CPT | Performed by: INTERNAL MEDICINE

## 2024-11-12 PROCEDURE — 90471 IMMUNIZATION ADMIN: CPT | Mod: PBBFAC

## 2024-11-12 NOTE — PROGRESS NOTES
Inflammatory Bowel Disease        Established Patient Note         TODAY'S VISIT DATE:  11/12/2024  The patient's last visit with me was on 7/26/2022.     PCP: Celia, Primary Doctor      Referring MD:   No ref. provider found    History of Present Illness:    Mr. Gutierrez is a 24 year old male with ulcerative colitis on Infliximab 5mg/kg every 4 weeks diagnosed in November 2020 here for follow-up.    His symptoms started in 2019 with rectal bleeding, diarrhea with associated urgency, tenesmus, and lower abdominal cramping. His weight was stable.     Colonoscopy in November 2020 showed Torrez endo Score 2 inflammation (moderate, with marked erythema, absent vascular pattern, friability, erosions) from the rectum to the hepatic flexure. Normal TI, cecum, ascending colon. He was started on Apriso 1.5 gm daily and canasa.    Received Feraheme x 2 in December 2020.     On follow-up in February 2021 he reported continued symptoms despite apriso 1.5 gm and uceris 9mg daily.   I stopped apriso and started vedolizumab in March 2021.  His week 14 trough was 12 in June 2021. He was changed to every 4 weeks due to ongoing symptoms and trough was 13 in August 2021 on q 4 weeks.    Initially he noticed less blood and frequency after his first 2 infusions but since then had continued symptoms of bleeding, urgency, diarrhea. Infectious stool studies were negative. Fecal calprotectin was 2464 was in August 2021. He was given Uceris then prednisone taper.     Fecal calprotectin was 274 in October 2021 December 13, 2021: Flex Sig: Moderately active (Torrez score 2) ulcerative colitis in rectum and distal sigmoid colon.  Inactive (Torrez Score 0) ulcerative colitis in proximal sigmoid colon and distal descending colon.  He was started on topical rectal therapy - canasa initially then transitioned to tacrolimus suppositories when canasa was not effective.     Fecal calprotectin was 831 in June 2022.  CRP 5.6        Given prednisone and reinforced compliance with tacrolimus suppositories.  Remained with significant anemia.     He was hospitalized in December 2022 for symptomatic anemia, Hgb 6.1 g/dL on admission.     December 2022: Flex sig: Torrez endo 3 to the transverse colon  Given prednisone taper and switched to infliximab.     December 2022: Calprotectin 1527; CRP 6.90.   March 2023: Calprotectin 358, Week 6 Trough 8.6. Infusions changed to 5 mg/kg every 4 weeks. He received Ferrlecit 125 mg IV x 3.      June 2023: Calprotectin 1118, CRP 5.70  July 2023: Started increased dose of Inflectra at 5 mg/kg every 4 weeks. Started receiving infusions with [facility]    When last seen in October 2023 he was doing well clinically.  October 2023: Fecal calprotectin: 98.7    May 2024: Fecal calprotectin: 76.3  June 2024: Torrez endo 0. Five adenomatous polyps removed.  June 2024: Fecal calprotectin: < 50    He is having 3-4 loose to soft stool a day without any urgency, tenesmus, or abd pain.  He sees bright red blood with stools at least twice a week.  He notices a hemorrhoid at times.  No nocturnal stools.  Appetite is good and weight is stable.  No joint pains, rashes, or eye problems other than vision.     He was having difficulty getting methotrexate filled with the pharmacy.  He has now been off it for 6 months. He stopped ferrous sulfate.     He takes famotidine 40 mg once a day.  It helps with heartburn and pyrosis.  He took ferrous sulfate but is no longer on it.     He denies regular NSAID use. He smokes 3 cigarettes a day. He smokes marijuana daily. No alcohol use. He denies a family history of IBD, colon polyps, or colon cancer.    IBD History:  IBD disease: UC  Disease location: pancolitis      Current IBD Therapy:  Infliximab (Inflectra) 5 mg/kg every 4 weeks (January 2023- present) (home infusions)  Stopped methotrexate Nov 2023    Therapeutic Drug Monitoring Labs:  March 2023: Week 6 IFX trough 8.6.    Prior IBD  Therapies:  Apriso 1.5 gm daily  Canasa 1 gm nightly  uceris 9mg daily  Prednisone  Entyvio 300 mg every 4 weeks (March 2021-December 2022), secondary loss of response - persistent rectal disease      Pertinent Endoscopy/Imaging:  November 11, 2020: Colonoscopy: Torrez Score 2 inflammation (moderate, with marked erythema, absent vascular pattern, friability, erosions) from the rectum to the hepatic flexure. Normal TI, cecum, ascending colon. Biopsies showed moderate to marked chronic active colitis with cryptitis, crypt abscesses, epithelial erosion, increased chronic inflammation and architectural distortion. Right colon biopsies showed mild inflammation.     December 13, 2021: Flex Sig: Moderately active (Torrez score 2) ulcerative colitis in rectum and distal sigmoid colon, unchanged since last examination.  Inactive (Torrez Score 0) ulcerative colitis in proximal sigmoid colon and distal descending colon, improved since last exam    December 15, 2022: EGD: normal    December 15, 2022: Flex sig: Torrez endo 3 to the transverse colon.  Path: moderately active chronic colitis    Radha 10, 2024: Torrez endo 0. Five adenomatous polyps removed. Mild chronic inactive colitis on path.       Prior Pertinent Surgeries:   none    Complications:   none    Extraintestinal Manifestations:  None    Review of Systems   Constitutional:  Negative for appetite change.   HENT:  Negative for trouble swallowing.    Cardiovascular: Negative.    Gastrointestinal:  Negative for change in bowel habit and constipation.   Neurological: Negative.    Hematological: Negative.    Psychiatric/Behavioral: Negative.     All other systems reviewed and are negative.         Medical/Surgical History:   Past Medical History:   Diagnosis Date    Acid reflux     ADD (attention deficit disorder)     Anxiety     Back pain     Ulcerative colitis      Past Surgical History:   Procedure Laterality Date    back injection      BIOPSY  12/15/2022    Procedure: BIOPSY;   Surgeon: Eleonora Philip MD;  Location: [facility] ENDOSCOPY;  Service: Gastroenterology;;    COLONOSCOPY      COLONOSCOPY, WITH 1 OR MORE BIOPSIES N/A 6/10/2024    Procedure: COLONOSCOPY, WITH 1 OR MORE BIOPSIES;  Surgeon: Eleonora Philip MD;  Location: [facility] ENDOSCOPY;  Service: Gastroenterology;  Laterality: N/A;    COLONOSCOPY, WITH POLYPECTOMY USING SNARE  6/10/2024    Procedure: COLONOSCOPY, WITH POLYPECTOMY USING SNARE;  Surgeon: Eleonora Philip MD;  Location: [facility] ENDOSCOPY;  Service: Gastroenterology;;    ESOPHAGOGASTRODUODENOSCOPY Left 12/15/2022    Procedure: EGD (ESOPHAGOGASTRODUODENOSCOPY);  Surgeon: Eleonora Philip MD;  Location: [facility] ENDOSCOPY;  Service: Gastroenterology;  Laterality: Left;    FLEXIBLE SIGMOIDOSCOPY N/A 12/15/2022    Procedure: SIGMOIDOSCOPY, FLEXIBLE;  Surgeon: Eleonora Philip MD;  Location: [facility] ENDOSCOPY;  Service: Gastroenterology;  Laterality: N/A;    SURGICAL REMOVAL OF PILONIDAL CYST           Family History:   Family History   Problem Relation Name Age of Onset    Diverticulitis Mother      Lupus Mother      Lupus Maternal Aunt      Bone cancer Maternal Aunt          Social History:   Social History     Socioeconomic History    Marital status: Unknown   Tobacco Use    Smoking status: Every Day     Current packs/day: 0.25     Average packs/day: 0.3 packs/day for 12.9 years (3.2 ttl pk-yrs)     Types: Cigarettes     Start date: 2012    Smokeless tobacco: Former    Tobacco comments:     1 cig per day   Substance and Sexual Activity    Alcohol use: Yes     Alcohol/week: 3.0 standard drinks of alcohol     Types: 3 Cans of beer per week     Comment: occasionally    Drug use: Yes     Frequency: 7.0 times per week     Types: Marijuana     Comment: daily    Sexual activity: Yes     Partners: Female     Social Drivers of Health     Financial Resource Strain: Low Risk  (12/14/2022)    Overall Financial Resource Strain (CARDIA)     Difficulty of Paying Living Expenses:  "Not hard at all   Food Insecurity: No Food Insecurity (12/14/2022)    Hunger Vital Sign     Worried About Running Out of Food in the Last Year: Never true     Ran Out of Food in the Last Year: Never true   Transportation Needs: No Transportation Needs (12/14/2022)    PRAPARE - Transportation     Lack of Transportation (Medical): No     Lack of Transportation (Non-Medical): No   Physical Activity: Unknown (12/13/2022)    Exercise Vital Sign     Days of Exercise per Week: 3 days   Stress: Stress Concern Present (12/13/2022)    [employer] [place] - Occupational Stress Questionnaire     Feeling of Stress : To some extent   Housing Stability: Unknown (12/14/2022)    Housing Stability Vital Sign     Unable to Pay for Housing in the Last Year: No     Unstable Housing in the Last Year: No        Review of patient's allergies indicates:  No Known Allergies    Current Medications:   Outpatient Medications Marked as Taking for the 11/12/24 encounter (Office Visit) with Eleonora Philip MD   Medication Sig Dispense Refill    clonazePAM (KLONOPIN) 0.5 MG tablet Take by mouth.      ergocalciferol (ERGOCALCIFEROL) 50,000 unit Cap Take 1 capsule (50,000 Units total) by mouth every 7 days. for 24 doses 12 capsule 1    EScitalopram oxalate (LEXAPRO) 10 MG tablet Take 10 mg by mouth once daily.      famotidine (PEPCID) 40 MG tablet Take 40 mg by mouth 2 (two) times daily as needed for Heartburn.          Vital Signs:  /75 (BP Location: Left arm, Patient Position: Sitting)   Pulse 61   Temp 98 °F (36.7 °C) (Oral)   Resp 16   Ht 6' 1" (1.854 m)   Wt 103 kg (227 lb)   SpO2 97%   BMI 29.95 kg/m²      Physical Exam  Vitals reviewed.   Constitutional:       Appearance: Normal appearance.   HENT:      Head: Normocephalic and atraumatic.      Mouth/Throat:      Mouth: Mucous membranes are moist.   Eyes:      Extraocular Movements: Extraocular movements intact.      Conjunctiva/sclera: Conjunctivae " normal.   Cardiovascular:      Rate and Rhythm: Normal rate and regular rhythm.   Pulmonary:      Effort: Pulmonary effort is normal.      Breath sounds: Normal breath sounds.   Abdominal:      General: Bowel sounds are normal.      Palpations: Abdomen is soft.      Tenderness: There is no abdominal tenderness.   Musculoskeletal:      Cervical back: Normal range of motion.   Skin:     General: Skin is warm and dry.      Coloration: Skin is pale.   Neurological:      General: No focal deficit present.      Mental Status: He is alert and oriented to person, place, and time.   Psychiatric:         Mood and Affect: Mood normal.         Behavior: Behavior normal.         Labs: Reviewed  Hgb   Date Value Ref Range Status   04/23/2024 14.3 14.0 - 18.0 g/dL Final     Albumin   Date Value Ref Range Status   04/23/2024 4.2 3.5 - 5.0 g/dL Final     Iron Level   Date Value Ref Range Status   04/23/2024 103 65 - 175 ug/dL Final     Ferritin Level   Date Value Ref Range Status   04/23/2024 6.43 (L) 21.81 - 274.66 ng/mL Final     Folate Level   Date Value Ref Range Status   04/23/2024 10.5 7.0 - 31.4 ng/mL Final     Vitamin B12   Date Value Ref Range Status   04/23/2024 699 213 - 816 pg/mL Final     Vitamin D   Date Value Ref Range Status   04/23/2024 26.6 (L) 30.0 - 80.0 ng/mL Final     Zinc   Date Value Ref Range Status   06/17/2022 1.65 (H) 0.66 - 1.10 mcg/mL Final     Comment:        -------------------ADDITIONAL INFORMATION-------------------  This test was developed and its performance characteristics   determined by [facility] in a manner consistent with CLIA   requirements. This test has not been cleared or approved by   the U.S. Food and Drug Administration.     Test Performed by:  [facility] - [id number] [place] [address]  : Juan Smart M.D. Ph.D.; CLIA# [id number]     CRP   Date Value Ref Range Status   04/23/2024 <1.00 <5.00 mg/L Final       Quantiferon gold: Negative - June 2022      Assessment/Plan:    Problem List Items Addressed This Visit    None          HEALTH MAINTENANCE:      Vaccinations:     Influenza (inactive):  recommended annually   PCV 13 (Prevnar): September 22, 2021  PCV 20 (prevnar): Due 2026  Tetanus (TdaP): Nov 2024, recommended every 10 years  HPV (males and females ages 11-44 yo): vaccinated  Meningococcal: vaccinated May 2021  Hepatitis B: not immune  Hepatitis A:  not immune  MMR (live vaccine): UTD      Chickenpox status/Varicella (live vaccine):  received vaccination, UTD  Shingrix: recommend   COVID-19: recommend     Colorectal cancer risk:  Risk factors: > 8 years of disease, > 1/3 colon involved  - Distribution of colonic disease: > 1/3 of colon  - Year of symptom onset: 2020  - Colonoscopy for surveillance after endoscopic remission starting 2028    Ophthalmologic exam recommended yearly - sees Dr. Harini Barnard.   Dermatologic exam recommended yearly due to risk of skin cancer with IBD/meds    Bone health:  Calcium 0947-9214 mg daily and vitamin D 1000 IU daily  Risk factors for osteopenia/osteoporosis: Uceris, UC, prednisone  Vitamin D: 73.6     DEXA scan: recommend baseline        Smoking status: Smokes cigars several times/week, marijuana use daily       Follow up: 6 months                 (males and females ages 11-46 yo): vaccinated  Meningococcal: vaccinated May 2021  Hepatitis B: not immune  Hepatitis A:  not immune  MMR (live vaccine): UTD      Chickenpox status/Varicella (live vaccine):  received vaccination, UTD  Shingrix: recommend   COVID-19: recommend     Colorectal cancer risk:  Risk factors: > 8 years of disease, > 1/3 colon involved  - Distribution of colonic disease: > 1/3 of colon  - Year of symptom onset: 2020  - Colonoscopy for surveillance after endoscopic remission starting 2028    Ophthalmologic exam recommended yearly - sees Dr. Harini Barnard.   Dermatologic exam recommended yearly due to risk of skin cancer with IBD/meds    Bone health:  Calcium 6592-5356 mg daily and vitamin D 1000 IU daily  Risk factors for osteopenia/osteoporosis: Uceris, UC, prednisone  Vitamin D: 73.6     DEXA scan: recommend baseline        Smoking status: Smokes cigars several times/week, marijuana use daily       Follow up: 6 months

## 2024-11-15 LAB — AR ZINC, SERUM/PLASMA: 89.4 UG/DL

## 2024-12-16 DIAGNOSIS — K51.00 ULCERATIVE PANCOLITIS WITHOUT COMPLICATION: Primary | ICD-10-CM

## 2024-12-16 RX ORDER — FOLIC ACID 1 MG/1
1 TABLET ORAL DAILY
Qty: 30 TABLET | Refills: 11 | Status: SHIPPED | OUTPATIENT
Start: 2024-12-16 | End: 2025-12-16

## 2024-12-27 NOTE — ED PROVIDER NOTES
"Verbal consent was acquired by the patient to use [employer] ambient listening note generation during this visit Yes      Subjective   Caitlyn Diamond is a 26 y.o. female who presents for:  History of Present Illness  The patient presents for evaluation of vaginal discharge.    She reports persistent symptoms of vaginal discharge, which she describes as unusual and accompanied by an odor. She suspects that her previous medication may have interfered with the test results, leading to a false negative. Despite a negative test result, she continues to experience symptoms. She has not had any new sexual partners and has abstained from sexual activity with her current partner since the onset of symptoms. She is uncertain about her partner's treatment status as they are no longer in contact. She has been using a metronidazole ointment, but the symptoms persist. She has not completed the prescribed course of oral metronidazole. She has a history of sexually transmitted infections, specifically chlamydia, which was diagnosed prior to her last test. In 11/2024, she tested positive for chlamydia and bacterial vaginosis, for which she received a single medication. However, she continues to experience symptoms. She also notes the presence of acne, which she associates with her chlamydia infection, as it resolved following treatment but has since recurred.    Supplemental Information  She is currently breastfeeding her 10-month-old child and did not cease breastfeeding during her antibiotic treatment. She maintains a diet that includes yogurt and takes vitamin supplements but does not use probiotics.    SOCIAL HISTORY  She has 3 children, ages 7, 4, and 10 months.    MEDICATIONS  Current: Doxycycline, metronidazole    ROS:  See HPI    Objective   /64 (BP Location: Left arm, Patient Position: Sitting, BP Cuff Size: Adult)   Pulse 74   Temp 36.5 °C (97.7 °F) (Temporal)   Ht 1.626 m (5' 4\")   Wt 78.5 kg (173 lb " Encounter Date: 12/13/2022       History     Chief Complaint   Patient presents with    Weakness     Pt reports feeling weak and SOB w/ activity. Hx UC, normally has iron infusions. Instructed to come to ED for labs.      Patient with pmhx of ulcerative colitis and NASIM presents today c/o generalized weakness and dyspnea on exertion that started 2 days ago. Patient says he has received iron infusions in the past and feels that he needs one today. His reports his last infusion was about 1-2 months ago. He has one scheduled for 12/22 and 12/29. He spoke with the GI clinic today to see if his infusion appointment could be moved up due to his symptoms and it was recommended he come to the ED for evaluation. He denies sob at rest, chest pain, peripheral edema, syncope, palpitations, increased diarrhea, bloody stools.     The history is provided by the patient. No  was used.   Review of patient's allergies indicates:  No Known Allergies  Past Medical History:   Diagnosis Date    Acid reflux     ADD (attention deficit disorder)     Anxiety     Ulcerative colitis      Past Surgical History:   Procedure Laterality Date    COLONOSCOPY      SURGICAL REMOVAL OF PILONIDAL CYST       Family History   Problem Relation Age of Onset    Diverticulitis Mother     Lupus Maternal Aunt      Social History     Tobacco Use    Smoking status: Every Day     Packs/day: 0.50     Types: Cigars, Cigarettes    Smokeless tobacco: Never   Substance Use Topics    Alcohol use: Yes     Comment: socially- rare    Drug use: Yes     Types: Marijuana     Review of Systems   Constitutional:  Negative for chills and fever.        Generalized weakness    Respiratory:  Negative for cough, chest tightness and shortness of breath.         Dyspnea on exertion   Cardiovascular:  Negative for chest pain, palpitations and leg swelling.   Gastrointestinal:  Negative for abdominal pain, anal bleeding, blood in stool, constipation, diarrhea, nausea  1.6 oz)   SpO2 97%   Physical Exam  General: Well nourished, well developed female in NAD, awake and conversant.  Eyes: Normal conjunctiva, anicteric.  Round symmetrical pupils.  ENT: Hearing grossly intact.  No nasal discharge.  Neck: Neck is supple.  No masses or thyromegaly.  CV: No lower extremity edema.  Respiratory: Respirations are nonlabored.  No wheezing.  Abdomen: Non-Distended.  Skin: Warm.  No rashes or ulcers.  MSK: Normal ambulation.  No clubbing or cyanosis.  Neuro: Sensation and CN II-XII grossly normal.  Psych: Alert and oriented.  Cooperative, appropriate mood and affect, normal judgment.      Results  Laboratory Studies  Positive for chlamydia and bacterial vaginosis in 11/2024. Negative for all other STDs.    Assessment & Plan  1. Vaginal discharge  2. Vaginal odor  New to examiner. The patient's symptoms suggest a possible recurrence of bacterial vaginosis (BV). She reports persistent discharge and a new onset of odor despite previous treatment with metronidazole and doxycycline. She did not complete the oral antibiotic course, which may contribute to the persistence of symptoms. A vaginal swab will be conducted to test for yeast, trichomonas, and BV. Additionally, a urine sample will be collected to screen for chlamydia and gonorrhea. She is advised to start a probiotic regimen to help restore normal vaginal rocky. The results will be communicated via [employer], and if any tests return positive, appropriate medication will be prescribed and sent to her pharmacy.  - VAGINAL PATHOGENS DNA PANEL; Future  - Chlamydia/GC, PCR (Urine); Future     Return if symptoms worsen or fail to improve.      Please note that this dictation was created using voice recognition software. I have made every reasonable attempt to correct obvious errors, but I expect that there are errors of grammar and possibly content that I did not discover before finalizing the note.   and vomiting.   Genitourinary:  Negative for dysuria, flank pain and hematuria.   Musculoskeletal:  Negative for arthralgias and myalgias.   Skin:  Negative for rash.   Neurological:  Negative for syncope, light-headedness and headaches.   All other systems reviewed and are negative.    Physical Exam     Initial Vitals [12/13/22 1734]   BP Pulse Resp Temp SpO2   121/66 103 18 99 °F (37.2 °C) 99 %      MAP       --         Physical Exam    Nursing note and vitals reviewed.  Constitutional: He appears well-developed and well-nourished. He is not diaphoretic. No distress.   HENT:   Head: Normocephalic and atraumatic.   Mouth/Throat: Oropharynx is clear and moist. No oropharyngeal exudate.   Pale palpebral conjunctiva and mucous membranes.   Eyes: Conjunctivae and EOM are normal. Pupils are equal, round, and reactive to light. No scleral icterus.   Neck: Neck supple.   Normal range of motion.  Cardiovascular:  Normal rate, regular rhythm, normal heart sounds and intact distal pulses.           Pulmonary/Chest: Breath sounds normal. No respiratory distress.   Abdominal: Abdomen is soft. Bowel sounds are normal. He exhibits no distension. There is no abdominal tenderness. There is no rebound and no guarding.   Musculoskeletal:         General: No edema.      Cervical back: Normal range of motion and neck supple.     Neurological: He is alert and oriented to person, place, and time. GCS score is 15. GCS eye subscore is 4. GCS verbal subscore is 5. GCS motor subscore is 6.   Skin: Skin is warm and dry. Capillary refill takes less than 2 seconds. There is pallor.   Psychiatric: He has a normal mood and affect.       ED Course   Critical Care    Date/Time: 12/13/2022 6:40 PM  Performed by: TIERNEY Ornelas  Authorized by: TIERNEY Ornelas   Total critical care time (exclusive of procedural time) : 0 minutes  Critical care time was exclusive of separately billable procedures and treating other  patients.  Critical care was necessary to treat or prevent imminent or life-threatening deterioration of the following conditions: symptomatic anemia.  Critical care was time spent personally by me on the following activities: development of treatment plan with patient or surrogate, discussions with consultants, evaluation of patient's response to treatment, examination of patient, obtaining history from patient or surrogate, ordering and performing treatments and interventions, ordering and review of laboratory studies, ordering and review of radiographic studies, pulse oximetry, re-evaluation of patient's condition and review of old charts.      Labs Reviewed   COMPREHENSIVE METABOLIC PANEL - Abnormal; Notable for the following components:       Result Value    Blood Urea Nitrogen 6.2 (*)     Protein Total 6.3 (*)     Albumin Level 2.9 (*)     Albumin/Globulin Ratio 0.9 (*)     All other components within normal limits   CBC WITH DIFFERENTIAL - Abnormal; Notable for the following components:    RBC 3.31 (*)     Hgb 6.1 (*)     Hct 22.0 (*)     MCV 66.5 (*)     MCH 18.4 (*)     MCHC 27.7 (*)     RDW 20.7 (*)     Platelet 608 (*)     Eos # 0.93 (*)     IG# 0.06 (*)     All other components within normal limits   SEDIMENTATION RATE - Abnormal; Notable for the following components:    Sed Rate 41 (*)     All other components within normal limits   C-REACTIVE PROTEIN - Abnormal; Notable for the following components:    C-Reactive Protein 6.90 (*)     All other components within normal limits   IRON AND TIBC - Abnormal; Notable for the following components:    Iron Level 11 (*)     Iron Binding Capacity Total 247 (*)     Iron Saturation 4 (*)     All other components within normal limits   FERRITIN - Abnormal; Notable for the following components:    Ferritin Level 8.52 (*)     All other components within normal limits   URINALYSIS - Abnormal; Notable for the following components:    Appearance, UA Turbid (*)      Protein, UA Trace (*)     Bacteria, UA Trace (*)     Squamous Epithelial Cells, UA Trace (*)     Mucous, UA Many (*)     Hyaline Casts, UA 0-2 (*)     All other components within normal limits   TROPONIN I - Normal   STOOL CULTURE [facility]   CBC W/ AUTO DIFFERENTIAL    Narrative:     The following orders were created for panel order CBC auto differential.  Procedure                               Abnormality         Status                     ---------                               -----------         ------                     CBC with Differential[[id number]]        Abnormal            Final result                 Please view results for these tests on the individual orders.   EXTRA TUBES    Narrative:     The following orders were created for panel order EXTRA TUBES.  Procedure                               Abnormality         Status                     ---------                               -----------         ------                     Light Blue Top Hold[[id number]]                              In process                 Red Top Hold[[id number]]                                     In process                   Please view results for these tests on the individual orders.   LIGHT BLUE TOP HOLD   RED TOP HOLD   EXTRA TUBES    Narrative:     The following orders were created for panel order EXTRA TUBES.  Procedure                               Abnormality         Status                     ---------                               -----------         ------                     Light Green Top Hold[[id number]]                             In process                 Lavender Top Hold[[id number]]                                In process                   Please view results for these tests on the individual orders.   LIGHT GREEN TOP HOLD   LAVENDER TOP HOLD   OCCULT BLOOD,STOOL,SCREEN (1-3)    Narrative:     The following orders were created for panel order Occult Blood, Stool Screening (1 -3).  Procedure                                Abnormality         Status                     ---------                               -----------         ------                     Occult Blood Stool, CA S...[[id number]]                                                 OCCULT BLOOD STOOL, CA S...[[id number]]                                                   Please view results for these tests on the individual orders.   OCCULT BLOOD STOOL, CA SCREEN   STOOL EXAM-OVA,CYSTS,PARASITES   OCCULT BLOOD STOOL, CA SCREEN 2ND SPEC   TYPE & SCREEN   GROUP & RH     EKG Readings: (Independently Interpreted)   EKG completed at 18:06. NSR with rate of 83 bpm. Normal axis. No STEMI or ischemic changes.    ECG Results              EKG 12-lead (In process)  Result time 12/13/22 18:31:14      In process by Interface, Lab In [facility] (12/13/22 18:31:14)                   Narrative:    Test Reason : R06.02,    Vent. Rate : 083 BPM     Atrial Rate : 085 BPM     P-R Int : 148 ms          QRS Dur : 080 ms      QT Int : 340 ms       P-R-T Axes : 056 077 057 degrees     QTc Int : 399 ms    Normal sinus rhythm  Normal ECG  When compared with ECG of 13-DEC-2022 17:55,  Previous ECG has undetermined rhythm, needs review    Referred By: [email or web address]   SELF           Confirmed By:                                      EKG 12-lead (In process)  Result time 12/13/22 18:31:34      In process by Interface, Lab In [facility] (12/13/22 18:31:34)                   Narrative:    Test Reason : R06.02,    Vent. Rate : 087 BPM     Atrial Rate : 087 BPM     P-R Int : 138 ms          QRS Dur : 082 ms      QT Int : 320 ms       P-R-T Axes : -68 077 064 degrees     QTc Int : 385 ms    Undetermined rhythm  Otherwise normal ECG  No previous ECGs available    Referred By: [email or web address]   SELF           Confirmed By:                                   Imaging Results              X-Ray Chest PA And Lateral (Final result)  Result time 12/13/22 18:53:34      Final result by Marino Gatica MD (12/13/22 18:53:34)                    Impression:      No acute abnormality.      Electronically signed by: Marino Gatica  Date:    12/13/2022  Time:    18:53               Narrative:    EXAMINATION:  XR CHEST PA AND LATERAL    CLINICAL HISTORY:  shortness of breath;    TECHNIQUE:  PA and lateral views of the chest were performed.    COMPARISON:  None    FINDINGS:  The lungs are clear, with normal appearance of pulmonary vasculature and no pleural effusion or pneumothorax.    The cardiac silhouette is normal in size. The hilar and mediastinal contours are unremarkable.    Bones are intact.                                       Medications   0.9%  NaCl infusion (for blood administration) (has no administration in time range)   sodium chloride 0.9% flush 10 mL (has no administration in time range)   naloxone 0.4 mg/mL injection 0.02 mg (has no administration in time range)   glucose chewable tablet 16 g (has no administration in time range)   glucose chewable tablet 24 g (has no administration in time range)   glucagon (human recombinant) injection 1 mg (has no administration in time range)   dextrose 10% bolus 125 mL 125 mL (has no administration in time range)   dextrose 10% bolus 250 mL 250 mL (has no administration in time range)   acetaminophen tablet 650 mg (has no administration in time range)   0.9%  NaCl infusion ( Intravenous New Bag 12/14/22 0903)   enoxaparin injection 40 mg (has no administration in time range)   pantoprazole EC tablet 40 mg (has no administration in time range)   ferric gluconate (FERRLECIT) 125 mg in sodium chloride 0.9% 100 mL IVPB (0 mg Intravenous Stopped 12/14/22 1340)           APC / Resident Notes:   I agree with Krystina's assessment/plan as listed above.  She consulted  me on this patient with return of the hemoglobin level requiring transfusion / admission.    History.  42-year-old male presents to ED for worsening fatigue and weakness.  Known hx of anemia.  Previous transfusions.  scheduled for 1  soon.  Denies any source of acute bleeding or blood loss.  No specific complaints or concerns at this time besides exertional dyspnea and generalized malaise.    Exam:  General: Well appearing, nontoxic, no acute distress  Head: Normocephalic Atraumatic  Eyes: EOMI  ENT: Airway patent, no stridor  Neck: supple  Chest: Lungs CTAB, no respiratory distress  Cardiac: RRR, no murmurs, rubs or gallops  Abdomen: soft, no rebound / guarding / rigidity to deep palpation.  Musculoskeletal: LE grossly symmetric, nontender  Skin: Normal skin tone  Neuro: A&Ox3; No obvious focal deficit    Assessment/plan.  Patient's was acutely anemic at 6.  Requires at least one unit of pRBC transfusion and to be on telemetry monitoring during process.  He was informed of this and agreeable to admission.  Medicine consulted and ultimately accepting of patient to their service.  Care transitioned. ([place])                    Clinical Impression:   Final diagnoses:  [D64.9] Symptomatic anemia (Primary)        ED Disposition Condition    Admit                 TIERNEY Ornleas  12/13/22 [id number]       Jimbo Blackwood MD  12/14/22 5480

## 2025-04-10 ENCOUNTER — TELEPHONE (OUTPATIENT)
Dept: GASTROENTEROLOGY | Facility: CLINIC | Age: 25
End: 2025-04-10
Payer: MEDICAID

## 2025-04-10 NOTE — TELEPHONE ENCOUNTER
----- Message from Jyoti sent at 4/10/2025  8:53 AM CDT -----  Regarding: clinical notes request  Rosario with [facility] called to get patient last clinical notes faxed to [phone]. They are trying to get medication approved. [facility] #  [phone]

## 2025-04-15 ENCOUNTER — PATIENT MESSAGE (OUTPATIENT)
Dept: GASTROENTEROLOGY | Facility: CLINIC | Age: 25
End: 2025-04-15
Payer: MEDICAID

## 2025-04-15 NOTE — TELEPHONE ENCOUNTER
Spoke with pt. He has new insurance- Medicare & Medicaid ([facility]). Will submit PA for [employer]

## 2025-07-15 ENCOUNTER — OFFICE VISIT (OUTPATIENT)
Dept: GASTROENTEROLOGY | Facility: CLINIC | Age: 25
End: 2025-07-15
Payer: MEDICARE

## 2025-07-15 VITALS
HEIGHT: 73 IN | WEIGHT: 231 LBS | BODY MASS INDEX: 30.62 KG/M2 | SYSTOLIC BLOOD PRESSURE: 118 MMHG | HEART RATE: 75 BPM | DIASTOLIC BLOOD PRESSURE: 67 MMHG | TEMPERATURE: 99 F | RESPIRATION RATE: 12 BRPM

## 2025-07-15 DIAGNOSIS — K51.00 ULCERATIVE PANCOLITIS WITHOUT COMPLICATION: Primary | ICD-10-CM

## 2025-07-15 DIAGNOSIS — Z79.899 OTHER LONG TERM (CURRENT) DRUG THERAPY: ICD-10-CM

## 2025-07-15 PROCEDURE — 99213 OFFICE O/P EST LOW 20 MIN: CPT | Mod: PBBFAC | Performed by: INTERNAL MEDICINE

## 2025-07-15 RX ORDER — EPINEPHRINE 1 MG/ML
1 INJECTION INTRAMUSCULAR; INTRAVENOUS; SUBCUTANEOUS ONCE
COMMUNITY
Start: 2025-05-28

## 2025-07-15 RX ORDER — INFLIXIMAB-DYYB 100 MG/10ML
INJECTION, POWDER, LYOPHILIZED, FOR SOLUTION INTRAVENOUS
COMMUNITY
Start: 2025-06-26

## 2025-07-15 NOTE — PROGRESS NOTES
Inflammatory Bowel Disease        Established Patient Note         TODAY'S VISIT DATE:  7/15/2025  The patient's last visit with me was on 7/26/2022.     PCP: Celia, Primary Doctor      Referring MD:   No ref. provider found    History of Present Illness:    Mr. Gutierrez is a 25 year old male with ulcerative colitis on Infliximab 5mg/kg every 4 weeks diagnosed in November 2020 here for follow-up.    His symptoms started in 2019 with rectal bleeding, diarrhea with associated urgency, tenesmus, and lower abdominal cramping. His weight was stable.     Colonoscopy in November 2020 showed Torrez endo Score 2 inflammation (moderate, with marked erythema, absent vascular pattern, friability, erosions) from the rectum to the hepatic flexure. Normal TI, cecum, ascending colon. He was started on Apriso 1.5 gm daily and canasa.    Received Feraheme x 2 in December 2020.     On follow-up in February 2021 he reported continued symptoms despite apriso 1.5 gm and uceris 9mg daily.   I stopped apriso and started vedolizumab in March 2021.  His week 14 trough was 12 in June 2021. He was changed to every 4 weeks due to ongoing symptoms and trough was 13 in August 2021 on q 4 weeks.    Initially he noticed less blood and frequency after his first 2 infusions but since then had continued symptoms of bleeding, urgency, diarrhea. Infectious stool studies were negative. Fecal calprotectin was 2464 was in August 2021. He was given Uceris then prednisone taper.     Fecal calprotectin was 274 in October 2021 December 13, 2021: Flex Sig: Moderately active (Torrez score 2) ulcerative colitis in rectum and distal sigmoid colon.  Inactive (Torrez Score 0) ulcerative colitis in proximal sigmoid colon and distal descending colon.  He was started on topical rectal therapy - canasa initially then transitioned to tacrolimus suppositories when canasa was not effective.     Fecal calprotectin was 831 in June 2022.  CRP 5.6        Given prednisone and reinforced compliance with tacrolimus suppositories.  Remained with significant anemia.     He was hospitalized in December 2022 for symptomatic anemia, Hgb 6.1 g/dL on admission.     December 2022: Flex sig: Torrez endo 3 to the transverse colon  Given prednisone taper and switched to infliximab.     December 2022: Calprotectin 1527; CRP 6.90.   March 2023: Calprotectin 358, Week 6 Trough 8.6. Infusions changed to 5 mg/kg every 4 weeks. He received Ferrlecit 125 mg IV x 3.      June 2023: Calprotectin 1118, CRP 5.70  July 2023: Started increased dose of Inflectra at 5 mg/kg every 4 weeks. Started receiving infusions with [facility]    When last seen in October 2023 he was doing well clinically.  October 2023: Fecal calprotectin: 98.7    May 2024: Fecal calprotectin: 76.3  June 2024: Torrez endo 0. Five adenomatous polyps removed.  June 2024: Fecal calprotectin: < 50    He is having 3 loose to soft stool a day without any urgency, tenesmus, or abd pain.  He sees small amount bright red blood with stools at least twice a week.  No nocturnal stools.  Appetite is good and weight is stable.  No joint pains, rashes, or eye problems other than vision.      He takes famotidine 40 mg prn.  It helps with heartburn and pyrosis.  He is taking ferrous sulfate prn.     He denies regular NSAID use. He smokes 3 cigarettes a day. He smokes marijuana daily. No alcohol use. He denies a family history of IBD, colon polyps, or colon cancer.    IBD History:  IBD disease: UC  Disease location: pancolitis      Current IBD Therapy:  Infliximab (Inflectra) 5 mg/kg every 4 weeks (January 2023- present) (home infusions)    Therapeutic Drug Monitoring Labs:  March 2023: Week 6 IFX trough 8.6.    Prior IBD Therapies:  Apriso 1.5 gm daily  Canasa 1 gm nightly  uceris 9mg daily  Prednisone  Entyvio 300 mg every 4 weeks (March 2021-December 2022), secondary loss of response - persistent rectal disease  Methotrexate      Pertinent  Endoscopy/Imaging:  November 11, 2020: Colonoscopy: Torrez Score 2 inflammation (moderate, with marked erythema, absent vascular pattern, friability, erosions) from the rectum to the hepatic flexure. Normal TI, cecum, ascending colon. Biopsies showed moderate to marked chronic active colitis with cryptitis, crypt abscesses, epithelial erosion, increased chronic inflammation and architectural distortion. Right colon biopsies showed mild inflammation.     December 13, 2021: Flex Sig: Moderately active (Torrez score 2) ulcerative colitis in rectum and distal sigmoid colon, unchanged since last examination.  Inactive (Torrez Score 0) ulcerative colitis in proximal sigmoid colon and distal descending colon, improved since last exam    December 15, 2022: EGD: normal    December 15, 2022: Flex sig: Torrez endo 3 to the transverse colon.  Path: moderately active chronic colitis    Radha 10, 2024: Torrez endo 0. Five adenomatous polyps removed. Mild chronic inactive colitis on path.       Prior Pertinent Surgeries:   none    Complications:   none    Extraintestinal Manifestations:  None    Review of Systems   Constitutional:  Negative for appetite change.   HENT:  Negative for trouble swallowing.    Cardiovascular: Negative.    Gastrointestinal:  Negative for change in bowel habit and constipation.   Neurological: Negative.    Hematological: Negative.    Psychiatric/Behavioral: Negative.     All other systems reviewed and are negative.         Medical/Surgical History:   Past Medical History:   Diagnosis Date    Acid reflux     ADD (attention deficit disorder)     Anxiety     Back pain     Ulcerative colitis      Past Surgical History:   Procedure Laterality Date    back injection      BIOPSY  12/15/2022    Procedure: BIOPSY;  Surgeon: Eleonora Philip MD;  Location: [facility] ENDOSCOPY;  Service: Gastroenterology;;    COLONOSCOPY      COLONOSCOPY, WITH 1 OR MORE BIOPSIES N/A 6/10/2024    Procedure: COLONOSCOPY, WITH 1 OR MORE BIOPSIES;   Surgeon: Eleonora Philip MD;  Location: [facility] ENDOSCOPY;  Service: Gastroenterology;  Laterality: N/A;    COLONOSCOPY, WITH POLYPECTOMY USING SNARE  6/10/2024    Procedure: COLONOSCOPY, WITH POLYPECTOMY USING SNARE;  Surgeon: Eleonora Philip MD;  Location: [facility] ENDOSCOPY;  Service: Gastroenterology;;    ESOPHAGOGASTRODUODENOSCOPY Left 12/15/2022    Procedure: EGD (ESOPHAGOGASTRODUODENOSCOPY);  Surgeon: Eleonora Philip MD;  Location: [facility] ENDOSCOPY;  Service: Gastroenterology;  Laterality: Left;    FLEXIBLE SIGMOIDOSCOPY N/A 12/15/2022    Procedure: SIGMOIDOSCOPY, FLEXIBLE;  Surgeon: Eleonora Philip MD;  Location: [facility] ENDOSCOPY;  Service: Gastroenterology;  Laterality: N/A;    SURGICAL REMOVAL OF PILONIDAL CYST           Family History:   Family History   Problem Relation Name Age of Onset    Diverticulitis Mother      Lupus Mother      Lupus Maternal Aunt      Bone cancer Maternal Aunt          Social History:   Social History     Socioeconomic History    Marital status: Single   Tobacco Use    Smoking status: Former     Average packs/day: 0.3 packs/day for 12.0 years (3.0 ttl pk-yrs)     Types: Cigarettes     Start date: 2012    Smokeless tobacco: Former    Tobacco comments:     1 cig per day   Substance and Sexual Activity    Alcohol use: Yes     Alcohol/week: 3.0 standard drinks of alcohol     Types: 3 Cans of beer per week     Comment: occasionally    Drug use: Yes     Frequency: 7.0 times per week     Types: Marijuana     Comment: daily    Sexual activity: Yes     Partners: Female     Social Drivers of Health     Financial Resource Strain: Low Risk  (12/14/2022)    Overall Financial Resource Strain (CARDIA)     Difficulty of Paying Living Expenses: Not hard at all   Food Insecurity: No Food Insecurity (12/14/2022)    Hunger Vital Sign     Worried About Running Out of Food in the Last Year: Never true     Ran Out of Food in the Last Year: Never true   Transportation Needs: No  "Transportation Needs (12/14/2022)    PRAPARE - Transportation     Lack of Transportation (Medical): No     Lack of Transportation (Non-Medical): No   Physical Activity: Unknown (12/13/2022)    Exercise Vital Sign     Days of Exercise per Week: 3 days   Stress: Stress Concern Present (12/13/2022)    [employer] [place] [employer] - Occupational Stress Questionnaire     Feeling of Stress : To some extent   Housing Stability: Unknown (12/14/2022)    Housing Stability Vital Sign     Unable to Pay for Housing in the Last Year: No     Unstable Housing in the Last Year: No        Review of patient's allergies indicates:  No Known Allergies    Current Medications:   Outpatient Medications Marked as Taking for the 7/15/25 encounter (Office Visit) with Eleonora Philip MD   Medication Sig Dispense Refill    ADRENALIN 1 mg/mL (1 mL) Soln injection Inject 1 mg into the muscle once.      EScitalopram oxalate (LEXAPRO) 10 MG tablet Take 10 mg by mouth once daily.      famotidine (PEPCID) 40 MG tablet Take 40 mg by mouth 2 (two) times daily as needed for Heartburn.      ferrous sulfate (FEOSOL) 325 mg (65 mg iron) Tab tablet Take 1 tablet (325 mg total) by mouth daily with breakfast. 30 tablet 6    folic acid (FOLVITE) 1 MG tablet Take 1 tablet (1 mg total) by mouth once daily. 30 tablet 11    INFLECTRA 100 mg injection Inject into the vein.      multivitamin capsule Take 1 capsule by mouth once daily.      omeprazole (PRILOSEC) 20 MG capsule Take 20 mg by mouth once daily.          Vital Signs:  /67 (BP Location: Right arm, Patient Position: Sitting)   Pulse 75   Temp 98.7 °F (37.1 °C) (Oral)   Resp 12   Ht 6' 1" (1.854 m)   Wt 104.8 kg (231 lb)   BMI 30.48 kg/m²      Physical Exam  Vitals reviewed.   Constitutional:       Appearance: Normal appearance.   HENT:      Head: Normocephalic and atraumatic.      Mouth/Throat:      Mouth: Mucous membranes are moist.   Eyes:      Extraocular Movements: " Extraocular movements intact.      Conjunctiva/sclera: Conjunctivae normal.   Cardiovascular:      Rate and Rhythm: Normal rate and regular rhythm.   Pulmonary:      Effort: Pulmonary effort is normal.      Breath sounds: Normal breath sounds.   Abdominal:      General: Bowel sounds are normal.      Palpations: Abdomen is soft.      Tenderness: There is no abdominal tenderness.   Musculoskeletal:      Cervical back: Normal range of motion.   Skin:     General: Skin is warm and dry.      Coloration: Skin is pale.   Neurological:      General: No focal deficit present.      Mental Status: He is alert and oriented to person, place, and time.   Psychiatric:         Mood and Affect: Mood normal.         Behavior: Behavior normal.         Labs: Reviewed  Hgb   Date Value Ref Range Status   11/12/2024 15.9 14.0 - 18.0 g/dL Final     Albumin   Date Value Ref Range Status   11/12/2024 4.4 3.5 - 5.0 g/dL Final     Iron Level   Date Value Ref Range Status   04/23/2024 103 65 - 175 ug/dL Final     Ferritin Level   Date Value Ref Range Status   11/12/2024 11.28 (L) 21.81 - 274.66 ng/mL Final     Folate Level   Date Value Ref Range Status   04/23/2024 10.5 7.0 - 31.4 ng/mL Final     Vitamin B12   Date Value Ref Range Status   04/23/2024 699 213 - 816 pg/mL Final     Vitamin D   Date Value Ref Range Status   11/12/2024 35 30 - 80 ng/mL Final     Zinc, Serum/Plasma   Date Value Ref Range Status   11/12/2024 89.4 60.0 - 120.0 ug/dL Final     Comment:     INTERPRETIVE INFORMATION: Zinc, Serum or Plasma    Elevated results may be due to skin or collection-related   contamination, including the use of a noncertified metal-free   collection/transport tube. If contamination concerns exist due to   elevated levels of serum/plasma zinc, confirmation with a second   specimen collected in a certified metal-free tube is recommended.    Circulating zinc concentrations are dependent on albumin status   and are depressed with malnutrition.  Zinc  may also be lowered   with infection, inflammation, stress, oral contraceptives, and   pregnancy.  Zinc may be elevated with zinc supplementation or   fasting.  Elevated zinc concentrations may interfere with copper   absorption.     This test was developed and its performance characteristics   determined by [facility]. It has not been cleared or   approved by the [place] Food and Drug Administration. This test was   performed in a CLIA certified laboratory and is intended for   clinical purposes.  Performed By: [facility]  [address] [place] [id number]  : Ej Cardenas MD, PhD  CLIA Number: [id number]     CRP   Date Value Ref Range Status   11/12/2024 <1.00 <5.00 mg/L Final     Calprotectin, F   Date Value Ref Range Status   06/28/2024 <50.0 <50.0 (Normal) mcg/g Final     Comment:        Test Performed by:  [facility]  [address] [id number]  : Shruti Freeman Ph.D.; CLIA# [id number]     Hep BsAg Interp   Date Value Ref Range Status   04/23/2024 Nonreactive Nonreactive Final     Hep BcAb Interp   Date Value Ref Range Status   04/23/2024 Nonreactive Nonreactive Final     Hep C Ab Interp   Date Value Ref Range Status   04/23/2024 Nonreactive Nonreactive Final         Quantiferon gold: Negative - April 2024      Assessment/Plan:    Problem List Items Addressed This Visit       Ulcerative pancolitis without complication - Primary    Diagnosed in 2020  Torrez endo 2 inflammation on colonoscopy in November 2020  Started Entyvio in March 2021. Week 14 trough was 12   Changed to every 4 weeks due to ongoing symptoms  Trough 13 in August 2021 on q 4 weeks  December 2021 Flex sig: Torrez 2 disease in rectosigmoid region, Torrez 0 in sigmoid and descending colon  Started topical therapy with canasa, tacrolimus, then Uceris foam with intermittent compliance and no improvement.    Given steroid taper in June 2022  for continued symptoms.  Fecal calprotectin 831  December 2022: Colonoscopy: Torrez Score 3 from rectum to transverse.    December 2022: Fecal calprotectin 1527     Infliximab 5 mg/kg + methotrexate started January 2023 (after Rinvoq denial)  Week 6 trough 8.6 - infusions increased to 5 mg/kg every 4 weeks  March 2023: fecal calprotectin 358   June 2023: Fecal calprotectin 1118, CRP 5.70  July 2023: Started increased dose of Inflectra at 5 mg/kg every 4 weeks.   October 2023: Fecal calprotectin: 98.7     Clinically improved.  Off methotrexate since Nov 2023    May 2024: Fecal calprotectin: 76.3  June 2024: Torrez endo 0. Five adenomatous polyps removed.  June 2024: Fecal calprotectin: < 50    Continue current therapy - infliximab (inflectra) 5 mg/kg every 4 weeks monotherapy  Recheck trough  Labs today  Follow fecal calprotectin         Relevant Orders    Calprotectin, Stool    C-Reactive Protein (Completed)    CBC Auto Differential (Completed)    Comprehensive Metabolic Panel (Completed)    Ferritin (Completed)    Hepatitis B Core Antibody, Total (Completed)    Hepatitis B Surface Antigen (Completed)    Quantiferon Gold TB     Other Visit Diagnoses         Other long term (current) drug therapy        Relevant Orders    Hepatitis B Core Antibody, Total (Completed)    Hepatitis B Surface Antigen (Completed)              HEALTH MAINTENANCE:      Vaccinations:     Influenza (inactive):  recommended annually   PCV 13 (Prevnar): September 22, 2021  PCV 20 (prevnar): Due 2026  Tetanus (TdaP): Nov 2024, recommended every 10 years  HPV (males and females ages 11-46 yo): vaccinated  Meningococcal: vaccinated May 2021  Hepatitis B: not immune  Hepatitis A:  not immune  MMR (live vaccine): UTD      Chickenpox status/Varicella (live vaccine):  vaccinated  Shingrix: recommend   COVID-19: recommend     Colorectal cancer risk:  Risk factors: > 8 years of disease, > 1/3 colon involved  - Distribution of colonic disease: > 1/3 of  colon  - Year of symptom onset: 2020  - Colonoscopy for surveillance after endoscopic remission     Ophthalmologic exam recommended yearly - sees Dr. Harini Barnard.   Dermatologic exam recommended yearly due to risk of skin cancer with IBD/meds    Bone health:  Calcium 1580-7705 mg daily and vitamin D 1000 IU daily  Risk factors for osteopenia/osteoporosis: Uceris, UC, prednisone  Vitamin D: 35     DEXA scan: recommend baseline       Smoking status: Smokes cigars several times/week, marijuana use daily       Follow up: 6 months

## 2025-07-21 ENCOUNTER — LAB VISIT (OUTPATIENT)
Dept: LAB | Facility: HOSPITAL | Age: 25
End: 2025-07-21
Attending: INTERNAL MEDICINE
Payer: MEDICARE

## 2025-07-21 DIAGNOSIS — K51.00 ULCERATIVE PANCOLITIS WITHOUT COMPLICATION: ICD-10-CM

## 2025-07-21 DIAGNOSIS — Z79.899 OTHER LONG TERM (CURRENT) DRUG THERAPY: ICD-10-CM

## 2025-07-21 LAB
ALBUMIN SERPL-MCNC: 4.1 G/DL (ref 3.5–5)
ALBUMIN/GLOB SERPL: 1.1 RATIO (ref 1.1–2)
ALP SERPL-CCNC: 88 UNIT/L (ref 40–150)
ALT SERPL-CCNC: 34 UNIT/L (ref 0–55)
ANION GAP SERPL CALC-SCNC: 8 MEQ/L
AST SERPL-CCNC: 22 UNIT/L (ref 11–45)
BASOPHILS # BLD AUTO: 0.07 X10(3)/MCL
BASOPHILS NFR BLD AUTO: 0.7 %
BILIRUB SERPL-MCNC: 0.3 MG/DL
BUN SERPL-MCNC: 15 MG/DL (ref 8.9–20.6)
CALCIUM SERPL-MCNC: 9.1 MG/DL (ref 8.4–10.2)
CHLORIDE SERPL-SCNC: 109 MMOL/L (ref 98–107)
CO2 SERPL-SCNC: 23 MMOL/L (ref 22–29)
CREAT SERPL-MCNC: 0.84 MG/DL (ref 0.72–1.25)
CREAT/UREA NIT SERPL: 18
CRP SERPL-MCNC: <1 MG/L
EOSINOPHIL # BLD AUTO: 0.29 X10(3)/MCL (ref 0–0.9)
EOSINOPHIL NFR BLD AUTO: 2.8 %
ERYTHROCYTE [DISTWIDTH] IN BLOOD BY AUTOMATED COUNT: 12.7 % (ref 11.5–17)
FERRITIN SERPL-MCNC: 17.6 NG/ML (ref 21.81–274.66)
GFR SERPLBLD CREATININE-BSD FMLA CKD-EPI: >60 ML/MIN/1.73/M2
GLOBULIN SER-MCNC: 3.6 GM/DL (ref 2.4–3.5)
GLUCOSE SERPL-MCNC: 96 MG/DL (ref 74–100)
HBV CORE AB SERPL QL IA: NONREACTIVE
HBV SURFACE AG SERPL QL IA: NONREACTIVE
HCT VFR BLD AUTO: 46.2 % (ref 42–52)
HGB BLD-MCNC: 16 G/DL (ref 14–18)
IMM GRANULOCYTES # BLD AUTO: 0.02 X10(3)/MCL (ref 0–0.04)
IMM GRANULOCYTES NFR BLD AUTO: 0.2 %
LYMPHOCYTES # BLD AUTO: 4.51 X10(3)/MCL (ref 0.6–4.6)
LYMPHOCYTES NFR BLD AUTO: 44.3 %
MCH RBC QN AUTO: 31.8 PG (ref 27–31)
MCHC RBC AUTO-ENTMCNC: 34.6 G/DL (ref 33–36)
MCV RBC AUTO: 91.8 FL (ref 80–94)
MONOCYTES # BLD AUTO: 0.79 X10(3)/MCL (ref 0.1–1.3)
MONOCYTES NFR BLD AUTO: 7.8 %
NEUTROPHILS # BLD AUTO: 4.5 X10(3)/MCL (ref 2.1–9.2)
NEUTROPHILS NFR BLD AUTO: 44.2 %
NRBC BLD AUTO-RTO: 0 %
PLATELET # BLD AUTO: 338 X10(3)/MCL (ref 130–400)
PMV BLD AUTO: 9.8 FL (ref 7.4–10.4)
POTASSIUM SERPL-SCNC: 4.1 MMOL/L (ref 3.5–5.1)
PROT SERPL-MCNC: 7.7 GM/DL (ref 6.4–8.3)
RBC # BLD AUTO: 5.03 X10(6)/MCL (ref 4.7–6.1)
SODIUM SERPL-SCNC: 140 MMOL/L (ref 136–145)
WBC # BLD AUTO: 10.18 X10(3)/MCL (ref 4.5–11.5)

## 2025-07-21 PROCEDURE — 83993 ASSAY FOR CALPROTECTIN FECAL: CPT

## 2025-07-21 PROCEDURE — 82728 ASSAY OF FERRITIN: CPT

## 2025-07-21 PROCEDURE — 85025 COMPLETE CBC W/AUTO DIFF WBC: CPT

## 2025-07-21 PROCEDURE — 86140 C-REACTIVE PROTEIN: CPT

## 2025-07-21 PROCEDURE — 36415 COLL VENOUS BLD VENIPUNCTURE: CPT

## 2025-07-21 PROCEDURE — 86704 HEP B CORE ANTIBODY TOTAL: CPT

## 2025-07-21 PROCEDURE — 86480 TB TEST CELL IMMUN MEASURE: CPT

## 2025-07-21 PROCEDURE — 87340 HEPATITIS B SURFACE AG IA: CPT

## 2025-07-21 PROCEDURE — 80053 COMPREHEN METABOLIC PANEL: CPT

## 2025-07-21 NOTE — ASSESSMENT & PLAN NOTE
Diagnosed in 2020  Torrez endo 2 inflammation on colonoscopy in November 2020  Started Entyvio in March 2021. Week 14 trough was 12   Changed to every 4 weeks due to ongoing symptoms  Trough 13 in August 2021 on q 4 weeks  December 2021 Flex sig: Torrez 2 disease in rectosigmoid region, Torrez 0 in sigmoid and descending colon  Started topical therapy with canasa, tacrolimus, then Uceris foam with intermittent compliance and no improvement.    Given steroid taper in June 2022 for continued symptoms.  Fecal calprotectin 831  December 2022: Colonoscopy: Torrez Score 3 from rectum to transverse.    December 2022: Fecal calprotectin 1527     Infliximab 5 mg/kg + methotrexate started January 2023 (after Rinvoq denial)  Week 6 trough 8.6 - infusions increased to 5 mg/kg every 4 weeks  March 2023: fecal calprotectin 358   June 2023: Fecal calprotectin 1118, CRP 5.70  July 2023: Started increased dose of Inflectra at 5 mg/kg every 4 weeks.   October 2023: Fecal calprotectin: 98.7     Clinically improved.  Off methotrexate since Nov 2023    May 2024: Fecal calprotectin: 76.3  June 2024: Torrez endo 0. Five adenomatous polyps removed.  June 2024: Fecal calprotectin: < 50    Continue current therapy - infliximab (inflectra) 5 mg/kg every 4 weeks monotherapy  Recheck trough  Labs today  Follow fecal calprotectin

## 2025-07-23 LAB
GAMMA INTERFERON BACKGROUND BLD IA-ACNC: 0.03 IU/ML
M TB IFN-G BLD-IMP: NEGATIVE
M TB IFN-G CD4+ BCKGRND COR BLD-ACNC: 0 IU/ML
M TB IFN-G CD4+CD8+ BCKGRND COR BLD-ACNC: -0.01 IU/ML
MITOGEN IGNF BCKGRD COR BLD-ACNC: >10 IU/ML

## 2025-07-24 LAB — CALPROTECTIN STL-MCNT: <50 MCG/G

## (undated) DEVICE — KIT SURGICAL COLON .25 1.1OZ

## (undated) DEVICE — FORCEP ALLIGATOR 2.8MM W/NDL

## (undated) DEVICE — MOUTHPIECE ENDO 60FR

## (undated) DEVICE — SNARE EXACTO COLD

## (undated) DEVICE — TRAP ETRAP POLYP 50 TRAY

## (undated) DEVICE — MANIFOLD 4 PORT